# Patient Record
Sex: FEMALE | Race: WHITE | NOT HISPANIC OR LATINO | Employment: OTHER | URBAN - METROPOLITAN AREA
[De-identification: names, ages, dates, MRNs, and addresses within clinical notes are randomized per-mention and may not be internally consistent; named-entity substitution may affect disease eponyms.]

---

## 2018-10-22 LAB
C TRACH DNA SPEC QL PROBE+SIG AMP: NEGATIVE
N GONORRHOEA DNA SPEC QL PROBE+SIG AMP: NEGATIVE
PAP-ABSTRACT: NORMAL
SPECIMEN DESCRIP: NORMAL
SPECIMEN DESCRIPTION: NORMAL

## 2018-11-19 ENCOUNTER — TRANSFERRED RECORDS (OUTPATIENT)
Dept: HEALTH INFORMATION MANAGEMENT | Facility: CLINIC | Age: 33
End: 2018-11-19

## 2018-11-20 ENCOUNTER — TRANSFERRED RECORDS (OUTPATIENT)
Dept: HEALTH INFORMATION MANAGEMENT | Facility: CLINIC | Age: 33
End: 2018-11-20

## 2018-12-06 ENCOUNTER — TRANSFERRED RECORDS (OUTPATIENT)
Dept: HEALTH INFORMATION MANAGEMENT | Facility: CLINIC | Age: 33
End: 2018-12-06

## 2018-12-17 ENCOUNTER — HOSPITAL ENCOUNTER (EMERGENCY)
Facility: CLINIC | Age: 33
Discharge: HOME | End: 2018-12-17
Attending: FAMILY MEDICINE | Admitting: FAMILY MEDICINE
Payer: COMMERCIAL

## 2018-12-17 ENCOUNTER — TELEPHONE (OUTPATIENT)
Dept: OBGYN | Facility: OTHER | Age: 33
End: 2018-12-17

## 2018-12-17 ENCOUNTER — APPOINTMENT (OUTPATIENT)
Dept: ULTRASOUND IMAGING | Facility: CLINIC | Age: 33
End: 2018-12-17
Attending: FAMILY MEDICINE
Payer: COMMERCIAL

## 2018-12-17 VITALS
TEMPERATURE: 98.2 F | RESPIRATION RATE: 18 BRPM | HEART RATE: 70 BPM | OXYGEN SATURATION: 100 % | DIASTOLIC BLOOD PRESSURE: 68 MMHG | SYSTOLIC BLOOD PRESSURE: 109 MMHG | WEIGHT: 143 LBS

## 2018-12-17 DIAGNOSIS — Z34.92 SECOND TRIMESTER PREGNANCY: ICD-10-CM

## 2018-12-17 DIAGNOSIS — K80.20 CALCULUS OF GALLBLADDER WITHOUT CHOLECYSTITIS WITHOUT OBSTRUCTION: ICD-10-CM

## 2018-12-17 LAB
ALBUMIN SERPL-MCNC: 2.8 G/DL (ref 3.4–5)
ALP SERPL-CCNC: 46 U/L (ref 40–150)
ALT SERPL W P-5'-P-CCNC: 10 U/L (ref 0–50)
ANION GAP SERPL CALCULATED.3IONS-SCNC: 7 MMOL/L (ref 3–14)
AST SERPL W P-5'-P-CCNC: 14 U/L (ref 0–45)
BASOPHILS # BLD AUTO: 0.1 10E9/L (ref 0–0.2)
BASOPHILS NFR BLD AUTO: 0.5 %
BILIRUB SERPL-MCNC: 0.3 MG/DL (ref 0.2–1.3)
BUN SERPL-MCNC: 11 MG/DL (ref 7–30)
CALCIUM SERPL-MCNC: 8.2 MG/DL (ref 8.5–10.1)
CHLORIDE SERPL-SCNC: 106 MMOL/L (ref 94–109)
CO2 SERPL-SCNC: 25 MMOL/L (ref 20–32)
CREAT SERPL-MCNC: 0.53 MG/DL (ref 0.52–1.04)
DIFFERENTIAL METHOD BLD: ABNORMAL
EOSINOPHIL NFR BLD AUTO: 1.1 %
ERYTHROCYTE [DISTWIDTH] IN BLOOD BY AUTOMATED COUNT: 13.3 % (ref 10–15)
GFR SERPL CREATININE-BSD FRML MDRD: >90 ML/MIN/1.7M2
GLUCOSE SERPL-MCNC: 93 MG/DL (ref 70–99)
HCT VFR BLD AUTO: 32.3 % (ref 35–47)
HGB BLD-MCNC: 11 G/DL (ref 11.7–15.7)
IMM GRANULOCYTES # BLD: 0.1 10E9/L (ref 0–0.4)
IMM GRANULOCYTES NFR BLD: 0.7 %
LIPASE SERPL-CCNC: 89 U/L (ref 73–393)
LYMPHOCYTES # BLD AUTO: 1.7 10E9/L (ref 0.8–5.3)
LYMPHOCYTES NFR BLD AUTO: 16.4 %
MCH RBC QN AUTO: 32.9 PG (ref 26.5–33)
MCHC RBC AUTO-ENTMCNC: 34.1 G/DL (ref 31.5–36.5)
MCV RBC AUTO: 97 FL (ref 78–100)
MONOCYTES # BLD AUTO: 0.7 10E9/L (ref 0–1.3)
MONOCYTES NFR BLD AUTO: 6.4 %
NEUTROPHILS # BLD AUTO: 7.8 10E9/L (ref 1.6–8.3)
NEUTROPHILS NFR BLD AUTO: 74.9 %
NRBC # BLD AUTO: 0 10*3/UL
NRBC BLD AUTO-RTO: 0 /100
PLATELET # BLD AUTO: 160 10E9/L (ref 150–450)
POTASSIUM SERPL-SCNC: 3.6 MMOL/L (ref 3.4–5.3)
PROT SERPL-MCNC: 6.8 G/DL (ref 6.8–8.8)
RBC # BLD AUTO: 3.34 10E12/L (ref 3.8–5.2)
SODIUM SERPL-SCNC: 138 MMOL/L (ref 133–144)
WBC # BLD AUTO: 10.5 10E9/L (ref 4–11)

## 2018-12-17 PROCEDURE — 85025 COMPLETE CBC W/AUTO DIFF WBC: CPT | Performed by: FAMILY MEDICINE

## 2018-12-17 PROCEDURE — 25000128 H RX IP 250 OP 636: Performed by: FAMILY MEDICINE

## 2018-12-17 PROCEDURE — 96360 HYDRATION IV INFUSION INIT: CPT | Performed by: FAMILY MEDICINE

## 2018-12-17 PROCEDURE — 99284 EMERGENCY DEPT VISIT MOD MDM: CPT | Mod: Z6 | Performed by: FAMILY MEDICINE

## 2018-12-17 PROCEDURE — 83690 ASSAY OF LIPASE: CPT | Performed by: FAMILY MEDICINE

## 2018-12-17 PROCEDURE — 80053 COMPREHEN METABOLIC PANEL: CPT | Performed by: FAMILY MEDICINE

## 2018-12-17 PROCEDURE — 99284 EMERGENCY DEPT VISIT MOD MDM: CPT | Mod: 25 | Performed by: FAMILY MEDICINE

## 2018-12-17 PROCEDURE — 36415 COLL VENOUS BLD VENIPUNCTURE: CPT | Performed by: FAMILY MEDICINE

## 2018-12-17 PROCEDURE — 76705 ECHO EXAM OF ABDOMEN: CPT

## 2018-12-17 PROCEDURE — 25000132 ZZH RX MED GY IP 250 OP 250 PS 637: Performed by: FAMILY MEDICINE

## 2018-12-17 RX ORDER — ACETAMINOPHEN 325 MG/1
975 TABLET ORAL ONCE
Status: COMPLETED | OUTPATIENT
Start: 2018-12-17 | End: 2018-12-17

## 2018-12-17 RX ORDER — SODIUM CHLORIDE 9 MG/ML
1000 INJECTION, SOLUTION INTRAVENOUS CONTINUOUS
Status: DISCONTINUED | OUTPATIENT
Start: 2018-12-17 | End: 2018-12-17 | Stop reason: HOSPADM

## 2018-12-17 RX ORDER — PRENATAL VIT/IRON FUM/FOLIC AC 27MG-0.8MG
1 TABLET ORAL DAILY
COMMUNITY

## 2018-12-17 RX ADMIN — ACETAMINOPHEN 975 MG: 325 TABLET ORAL at 11:46

## 2018-12-17 RX ADMIN — SODIUM CHLORIDE 1000 ML: 9 INJECTION, SOLUTION INTRAVENOUS at 12:51

## 2018-12-17 SDOH — HEALTH STABILITY: MENTAL HEALTH: HOW OFTEN DO YOU HAVE A DRINK CONTAINING ALCOHOL?: NEVER

## 2018-12-17 NOTE — ED AVS SNAPSHOT
[facility] Emergency Department  [address] [place] [address]  Phone:  [phone]  Fax:  [phone]                                    Ethan Watt   MRN: [id number]    Department:  [facility] Emergency Department   Date of Visit:  12/17/2018           After Visit Summary Signature Page    I have received my discharge instructions, and my questions have been answered. I have discussed any challenges I see with this plan with the nurse or doctor.    ..........................................................................................................................................  Patient/Patient Representative Signature      ..........................................................................................................................................  Patient Representative Print Name and Relationship to Patient    ..................................................               ................................................  Date                                   Time    ..........................................................................................................................................  Reviewed by Signature/Title    ...................................................              ..............................................  Date                                               Time          22EPIC Rev 08/18

## 2018-12-17 NOTE — TELEPHONE ENCOUNTER
"Received phone call from pt.  Pt c/o right epigastric pain radiating to right shoulder.  Reports pain 6/10. Denies blurred vision, floaters and h/a. Reports baby moving normally \"feeling flutters\"   Reports chest tightness yesterday.  Advised pt to present to the nearest ED. Pt agrees with plan of care. Nik James RN on 12/17/2018 at 9:33 AM      "

## 2018-12-17 NOTE — ED PROVIDER NOTES
History     Chief Complaint   Patient presents with     Abdominal Pain     HPI  Ethan Watt is a 33 year old female who presents with right upper quadrant abdominal pain this been going on and off for the past 3 days.  It seems to come on only at night.  Patient states taking a deep breath, bending over, bending or twisting or touching the area seems to make the pain worse, nothing seems to improve it other than just time.  Patient denies any nausea or any vomiting.  Patient is currently 20 weeks pregnant but denies any vaginal bleeding or spotting.  Patient denies any dysuria or hematuria.  Patient denies a cough or any shortness of breath.    Problem List:    There are no active problems to display for this patient.       Past Medical History:    History reviewed. No pertinent past medical history.    Past Surgical History:    History reviewed. No pertinent surgical history.    Family History:    History reviewed. No pertinent family history.    Social History:  Marital Status:   [2]  Social History     Tobacco Use     Smoking status: Former Smoker     Last attempt to quit: 2014     Years since quittin.0     Smokeless tobacco: Never Used   Substance Use Topics     Alcohol use: No     Frequency: Never     Drug use: No        Medications:      Prenatal Vit-Fe Fumarate-FA (PRENATAL MULTIVITAMIN W/IRON) 27-0.8 MG tablet         Review of Systems   All other systems reviewed and are negative.      Physical Exam   BP: 114/65  Pulse: 67  Temp: 98.2  F (36.8  C)  Resp: 18  Weight: 64.9 kg (143 lb)  SpO2: 100 %      Physical Exam   Constitutional: She appears well-developed and well-nourished. No distress.   HENT:   Head: Normocephalic and atraumatic.   Mouth/Throat: Oropharynx is clear and moist. No oropharyngeal exudate.   Eyes: EOM are normal.   Neck: Normal range of motion.   Cardiovascular: Normal rate, regular rhythm and normal heart sounds.   No murmur heard.  Pulmonary/Chest: Effort normal  and breath sounds normal. No stridor. No respiratory distress. She has no wheezes.   Abdominal: Soft. Bowel sounds are normal. She exhibits no distension. There is tenderness (ruq abd pain). There is no guarding.   Skin: She is not diaphoretic.   Nursing note and vitals reviewed.      ED Course        Procedures    Results for orders placed or performed during the hospital encounter of 12/17/18   US Abdomen Limited (RUQ)    Narrative    ULTRASOUND ABDOMEN LIMITED December 17, 2018 11:50 AM     HISTORY: Right upper quadrant abdomen pain, 20 weeks pregnant, worried  about biliary colic.     COMPARISON: None.    FINDINGS: The liver demonstrates a mildly coarse, diffusely  hyperechoic echotexture consistent with diffuse fatty infiltration.  There is an ovoid nodule in the posterior segment of the right lobe of  the liver measuring 3.5 x 3.3 x 3.2 cm it demonstrates increased  echogenicity but no abnormal blood flow on Doppler interrogation.  Differential diagnosis for this nodule includes an area of focal fatty  replacement, a hepatic hemangioma or hepatic adenoma. Unless the  patient has a history of malignancy, a malignant lesion is considered  unlikely. No other focal liver lesions identified.    The gallbladder is fluid filled. There are multiple hyperechoic  shadowing gallstones within the gallbladder. There is no gallbladder  wall thickening. There is no pericholecystic fluid. There is no  sonographic Bello's sign. The common duct is normal in size at 4 mm  in diameter.    The pancreas is unremarkable.    The right kidney measures 10.9 cm in length. Right renal echotexture  is normal. There is no hydronephrosis on the right. There is no renal  cyst on the right.    The abdominal aorta and inferior vena cava are visualized.      Impression    IMPRESSION:      1. Ovoid hyperechoic 3.5 cm nodule in the posterior segment of the  right lobe of the liver. Differential diagnosis includes hepatic  hemangioma, focal fatty  replacement and hepatic adenoma. Unless the  patient has an underlying history of malignancy, malignancy is  considered less likely.  2. Gallstones without evidence for acute cholecystitis.  3. Otherwise, normal right upper quadrant ultrasound.   CBC with platelets differential   Result Value Ref Range    WBC 10.5 4.0 - 11.0 10e9/L    RBC Count 3.34 (L) 3.8 - 5.2 10e12/L    Hemoglobin 11.0 (L) 11.7 - 15.7 g/dL    Hematocrit 32.3 (L) 35.0 - 47.0 %    MCV 97 78 - 100 fl    MCH 32.9 26.5 - 33.0 pg    MCHC 34.1 31.5 - 36.5 g/dL    RDW 13.3 10.0 - 15.0 %    Platelet Count 160 150 - 450 10e9/L    Diff Method Automated Method     % Neutrophils 74.9 %    % Lymphocytes 16.4 %    % Monocytes 6.4 %    % Eosinophils 1.1 %    % Basophils 0.5 %    % Immature Granulocytes 0.7 %    Nucleated RBCs 0 0 /100    Absolute Neutrophil 7.8 1.6 - 8.3 10e9/L    Absolute Lymphocytes 1.7 0.8 - 5.3 10e9/L    Absolute Monocytes 0.7 0.0 - 1.3 10e9/L    Absolute Basophils 0.1 0.0 - 0.2 10e9/L    Abs Immature Granulocytes 0.1 0 - 0.4 10e9/L    Absolute Nucleated RBC 0.0    Comprehensive metabolic panel   Result Value Ref Range    Sodium 138 133 - 144 mmol/L    Potassium 3.6 3.4 - 5.3 mmol/L    Chloride 106 94 - 109 mmol/L    Carbon Dioxide 25 20 - 32 mmol/L    Anion Gap 7 3 - 14 mmol/L    Glucose 93 70 - 99 mg/dL    Urea Nitrogen 11 7 - 30 mg/dL    Creatinine 0.53 0.52 - 1.04 mg/dL    GFR Estimate >90 >60 mL/min/1.7m2    GFR Estimate If Black >90 >60 mL/min/1.7m2    Calcium 8.2 (L) 8.5 - 10.1 mg/dL    Bilirubin Total 0.3 0.2 - 1.3 mg/dL    Albumin 2.8 (L) 3.4 - 5.0 g/dL    Protein Total 6.8 6.8 - 8.8 g/dL    Alkaline Phosphatase 46 40 - 150 U/L    ALT 10 0 - 50 U/L    AST 14 0 - 45 U/L   Lipase   Result Value Ref Range    Lipase 89 73 - 393 U/L     Medications   0.9% sodium chloride BOLUS (1,000 mLs Intravenous New Bag 12/17/18 1251)     Followed by   sodium chloride 0.9% infusion (not administered)   acetaminophen (TYLENOL) tablet 975 mg (975 mg  Oral Given 12/17/18 1146)     Labs are reviewed and were unremarkable.  Ultrasound did show gallstones but no signs of wall thickening or other signs of complication.  Patient's pain is well controlled with Tylenol here.  I did speak with the patient's OB doctor, Dr. James, she recommended to have the patient follow-up with surgery and if we can manage this without surgery that would be preferable but this is the time in her pregnancy that surgery would be the least amount of risk.  She also recommended just for her to keep her appointment with her at the end of the month.  She could call if there is any other concerns or symptoms.  In the meantime I will have the patient stick with a low-fat low-fat diet.  All questions were answered and patient is safe to be discharged home at this point.    Assessments & Plan (with Medical Decision Making)  Cholelithiasis, second trimester pregnancy     I have reviewed the nursing notes.    I have reviewed the findings, diagnosis, plan and need for follow up with the patient.              12/17/2018   [facility] EMERGENCY DEPARTMENT     Herberth Gooden MD  12/17/18 [id number]

## 2018-12-17 NOTE — LETTER
12/17/18      To Whom it may concern:    Ethan Watt was in our Emergency Department today, 12/17/18.  Her  needs to be back for an emergency surgery visit for her this Friday.  Please help facilitate this for him.      Sincerely,      Herberth Gooden MD on 12/17/2018 at 2:37 PM

## 2018-12-21 ENCOUNTER — OFFICE VISIT (OUTPATIENT)
Dept: SURGERY | Facility: CLINIC | Age: 33
End: 2018-12-21
Payer: COMMERCIAL

## 2018-12-21 VITALS
SYSTOLIC BLOOD PRESSURE: 120 MMHG | HEART RATE: 94 BPM | DIASTOLIC BLOOD PRESSURE: 60 MMHG | WEIGHT: 145.5 LBS | OXYGEN SATURATION: 99 %

## 2018-12-21 DIAGNOSIS — K80.50 BILIARY COLIC: Primary | ICD-10-CM

## 2018-12-21 DIAGNOSIS — Z3A.20 20 WEEKS GESTATION OF PREGNANCY: ICD-10-CM

## 2018-12-21 PROCEDURE — 99244 OFF/OP CNSLTJ NEW/EST MOD 40: CPT | Performed by: SPECIALIST

## 2018-12-21 NOTE — NURSING NOTE
Ethan Watt is a 33 year old female who presents for:  Chief Complaint   Patient presents with     Abdominal Pain     Consult     referring Giacomo        Initial Vitals:  /60 (BP Location: Right arm, Patient Position: Sitting, Cuff Size: Adult Large)   Pulse 94   Wt 66 kg (145 lb 8 oz)   LMP 07/26/2018 (Approximate)   SpO2 99%  There is no height or weight on file to calculate BMI.. There is no height or weight on file to calculate BSA. BP completed using cuff size: large  Data Unavailable    Do you feel safe in your environment?  Yes  Do you need any refills today? No    Nursing Comments:         Aruna Alfaro
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
## 2018-12-24 ENCOUNTER — TELEPHONE (OUTPATIENT)
Dept: OBGYN | Facility: CLINIC | Age: 33
End: 2018-12-24

## 2018-12-24 ENCOUNTER — TELEPHONE (OUTPATIENT)
Dept: SURGERY | Facility: OTHER | Age: 33
End: 2018-12-24

## 2018-12-24 DIAGNOSIS — K80.50 BILIARY COLIC: Primary | ICD-10-CM

## 2018-12-24 NOTE — TELEPHONE ENCOUNTER
Reason for Call:  Other appointment    Detailed comments: Patient has decided she would like to proceed with surgery. Please place orders and call patient to schedule.     Phone Number Patient can be reached at: Home number on file [phone] (home)    Best Time: any    Can we leave a detailed message on this number? YES    Call taken on 12/24/2018 at 8:27 AM by Agata Obregon

## 2018-12-24 NOTE — TELEPHONE ENCOUNTER
Called placed to patient informing her Dr. Rinaldi's surgery scheduler will contact her later this week, due to the holiday.  Patient verbally states she understands and will await call back...    Message routed to Dr. Rinaldi for surgery order, than he will route to surgery schedulers....................Aruna Alfaro CMA  ([facility])

## 2018-12-24 NOTE — TELEPHONE ENCOUNTER
Pt calling to verify that her records came from [place] for her appt next week.  Please let pt know.  thanks

## 2018-12-26 NOTE — TELEPHONE ENCOUNTER
Do not see any records have been received as of yet. Phone call to patient- notified of this.  Gave [facility] fax number to have records resent.  Patient understood.

## 2018-12-26 NOTE — TELEPHONE ENCOUNTER
Pt calling today and has several questions in regards to doing surgery while she is pregnant, please call pt to discuss. Thank You Ilda

## 2018-12-27 NOTE — TELEPHONE ENCOUNTER
Type of surgery: laparoscopic cholecysectomy  Location of surgery: [facility]   Date of surgery: 1/16/19  Surgeon: Dr. Rinaldi  Pre-Op Appt Date: 1/11/19  Post-Op Appt Date: 1/25/19   Packet sent out: Surgery packet mailed to patient's home address.   Pre-cert/Authorization completed: NA  Date: 12/27/2018    Angi Ayoub  Surgery Scheduler

## 2018-12-31 ENCOUNTER — PRENATAL OFFICE VISIT (OUTPATIENT)
Dept: OBGYN | Facility: OTHER | Age: 33
End: 2018-12-31
Payer: COMMERCIAL

## 2018-12-31 VITALS
WEIGHT: 148.25 LBS | HEIGHT: 67 IN | HEART RATE: 68 BPM | SYSTOLIC BLOOD PRESSURE: 100 MMHG | DIASTOLIC BLOOD PRESSURE: 60 MMHG | BODY MASS INDEX: 23.27 KG/M2

## 2018-12-31 DIAGNOSIS — Z98.890 H/O LEEP: ICD-10-CM

## 2018-12-31 DIAGNOSIS — O26.899 RH NEGATIVE STATE IN ANTEPARTUM PERIOD: ICD-10-CM

## 2018-12-31 DIAGNOSIS — Z67.91 RH NEGATIVE STATE IN ANTEPARTUM PERIOD: ICD-10-CM

## 2018-12-31 DIAGNOSIS — Z23 NEED FOR TDAP VACCINATION: ICD-10-CM

## 2018-12-31 DIAGNOSIS — Z34.92 NORMAL PREGNANCY IN SECOND TRIMESTER: ICD-10-CM

## 2018-12-31 PROBLEM — K80.20 CALCULUS OF GALLBLADDER: Status: ACTIVE | Noted: 2018-12-31

## 2018-12-31 PROCEDURE — 99207 ZZC PRENATAL VISIT: CPT | Performed by: OBSTETRICS & GYNECOLOGY

## 2018-12-31 ASSESSMENT — PATIENT HEALTH QUESTIONNAIRE - PHQ9
SUM OF ALL RESPONSES TO PHQ QUESTIONS 1-9: 2
SUM OF ALL RESPONSES TO PHQ QUESTIONS 1-9: 2
10. IF YOU CHECKED OFF ANY PROBLEMS, HOW DIFFICULT HAVE THESE PROBLEMS MADE IT FOR YOU TO DO YOUR WORK, TAKE CARE OF THINGS AT HOME, OR GET ALONG WITH OTHER PEOPLE: NOT DIFFICULT AT ALL

## 2018-12-31 ASSESSMENT — MIFFLIN-ST. JEOR: SCORE: 1405.21

## 2018-12-31 NOTE — NURSING NOTE
"Chief Complaint   Patient presents with     Prenatal Care       Initial /60 (BP Location: Left arm, Patient Position: Chair, Cuff Size: Adult Regular)   Pulse 68   Ht 1.702 m (5' 7.01\")   Wt 67.2 kg (148 lb 4 oz)   LMP 2018 (Approximate)   BMI 23.21 kg/m   Estimated body mass index is 23.21 kg/m  as calculated from the following:    Height as of this encounter: 1.702 m (5' 7.01\").    Weight as of this encounter: 67.2 kg (148 lb 4 oz).  BP completed using cuff size: regular        Agata Galvez, PAIGE  2018          "

## 2018-12-31 NOTE — PROGRESS NOTES
33 year old  at 22w4d weeks presents to the clinic for a routine prenatal visit.  Patient is a transfer from [place].  Records already sent here. Will review those when they are available.    Patient was seen in the ED 2 weeks ago with gallstones.  She has surgery  with Dr. Rinaldi.  She has been feeling better since the ED visit but is still planning on surgery  Patient had cleft palate as a child=will have patient see [facility].  She was told at her anatomy ultrasound that the palate was not well seen.  No leakage of fluid, vaginal bleeding, or contractions   Good fetal movement.  FHTs:  170's  Fundal height:  23cm  Anatomy ultrasound already completed and normal per patient  RTC 4 weeks    Sherly De Dios

## 2019-01-01 ASSESSMENT — PATIENT HEALTH QUESTIONNAIRE - PHQ9: SUM OF ALL RESPONSES TO PHQ QUESTIONS 1-9: 2

## 2019-01-08 PROBLEM — Z34.92 NORMAL PREGNANCY IN SECOND TRIMESTER: Status: ACTIVE | Noted: 2018-12-31

## 2019-01-11 ENCOUNTER — NURSE TRIAGE (OUTPATIENT)
Dept: NURSING | Facility: CLINIC | Age: 34
End: 2019-01-11

## 2019-01-11 ENCOUNTER — TELEPHONE (OUTPATIENT)
Dept: OBGYN | Facility: CLINIC | Age: 34
End: 2019-01-11

## 2019-01-11 ENCOUNTER — OFFICE VISIT (OUTPATIENT)
Dept: FAMILY MEDICINE | Facility: CLINIC | Age: 34
End: 2019-01-11
Payer: COMMERCIAL

## 2019-01-11 ENCOUNTER — TELEPHONE (OUTPATIENT)
Dept: FAMILY MEDICINE | Facility: CLINIC | Age: 34
End: 2019-01-11

## 2019-01-11 VITALS
HEART RATE: 74 BPM | RESPIRATION RATE: 18 BRPM | BODY MASS INDEX: 23.07 KG/M2 | WEIGHT: 147 LBS | OXYGEN SATURATION: 100 % | HEIGHT: 67 IN | DIASTOLIC BLOOD PRESSURE: 62 MMHG | TEMPERATURE: 98.3 F | SYSTOLIC BLOOD PRESSURE: 100 MMHG

## 2019-01-11 DIAGNOSIS — O26.899 RH NEGATIVE STATE IN ANTEPARTUM PERIOD: ICD-10-CM

## 2019-01-11 DIAGNOSIS — K80.20 CALCULUS OF GALLBLADDER WITHOUT CHOLECYSTITIS WITHOUT OBSTRUCTION: ICD-10-CM

## 2019-01-11 DIAGNOSIS — K80.50 BILIARY COLIC: ICD-10-CM

## 2019-01-11 DIAGNOSIS — Z67.91 RH NEGATIVE STATE IN ANTEPARTUM PERIOD: ICD-10-CM

## 2019-01-11 DIAGNOSIS — Z01.818 PREOP GENERAL PHYSICAL EXAM: Primary | ICD-10-CM

## 2019-01-11 DIAGNOSIS — Z34.92 NORMAL PREGNANCY IN SECOND TRIMESTER: ICD-10-CM

## 2019-01-11 PROCEDURE — 99205 OFFICE O/P NEW HI 60 MIN: CPT | Performed by: FAMILY MEDICINE

## 2019-01-11 ASSESSMENT — MIFFLIN-ST. JEOR: SCORE: 1394.42

## 2019-01-11 ASSESSMENT — PAIN SCALES - GENERAL: PAINLEVEL: NO PAIN (0)

## 2019-01-11 NOTE — PROGRESS NOTES
[address] [address]  [phone]  Dept: [phone]    PRE-OP EVALUATION:  Today's date: 2019    Ethan Watt (: 1985) presents for pre-operative evaluation assessment as requested by Dr. Rinaldi .  She requires evaluation and anesthesia risk assessment prior to undergoing surgery/procedure for treatment of gallbladder disease, stones  .    Fax number for surgical facility:   Primary Physician: No Ref-Primary, Physician  Type of Anesthesia Anticipated: General    Patient has a Health Care Directive or Living Will:  NO    Preop Questions 2019   Who is doing your surgery? Davey Rinaldi   What are you having done? gallbladder removal   Date of Surgery/Procedure: 2018   Facility or Hospital where procedure/surgery will be performed: [facility]   1.  Do you have a history of Heart attack, stroke, stent, coronary bypass surgery, or other heart surgery? No   2.  Do you ever have any pain or discomfort in your chest? No   3.  Do you have a history of  Heart Failure? No   4.   Are you troubled by shortness of breath when:  walking on a level surface, or up a slight hill, or at night? No   5.  Do you currently have a cold, bronchitis or other respiratory infection? No   6.  Do you have a cough, shortness of breath, or wheezing? No   7.  Do you sometimes get pains in the calves of your legs when you walk? No   8. Do you or anyone in your family have previous history of blood clots? YES - Grandpa had a clot in cartoid.  No family medical history of VTE.     9.  Do you or does anyone in your family have a serious bleeding problem such as prolonged bleeding following surgeries or cuts? NO    10. Have you ever had problems with anemia or been told to take iron pills? No   11. Have you had any abnormal blood loss such as black, tarry or bloody stools, or abnormal vaginal bleeding? No   12. Have you ever had a blood transfusion?  No   13. Have you or any of your relatives ever had problems with anesthesia? No   14. Do you have sleep apnea, excessive snoring or daytime drowsiness? No   15. Do you have any prosthetic heart valves? No   16. Do you have prosthetic joints? No   17. Is there any chance that you may be pregnant? YES - 24 weeks 1 day as we do preop.         HPI:     HPI related to upcoming procedure: has had 2 different gallbladder attacks and 1 severe enough to bring her to the ER.  Is pregnant, but OB/GYN recommended that if she was going to have her gallbladder taken out, now is the time to do it as she is 24w1d.  She is rh negative.  Labs are being sent from [place] and not in the system at this time.      See problem list for active medical problems.  Problems all longstanding and stable, except as noted/documented.  See ROS for pertinent symptoms related to these conditions.                                                                                                                                                          .    MEDICAL HISTORY:     Patient Active Problem List    Diagnosis Date Noted     Normal pregnancy in second trimester 12/31/2018     Priority: Medium     AFP=negative, RPR=negative, normal first trimester screening, normal pap smear 10/22 with negative HPV, negative GC and Chlamydia, A- antibody negative, RI, Ugzc=354, HIV=negative, HBsAg=negative 10/10       Need for Tdap vaccination 12/31/2018     Priority: Medium     Calculus of gallbladder 12/31/2018     Priority: Medium     H/O LEEP 12/31/2018     Priority: Medium     Rh negative state in antepartum period 12/31/2018     Priority: Medium      No past medical history on file.  No past surgical history on file.  Current Outpatient Medications   Medication Sig Dispense Refill     Prenatal Vit-Fe Fumarate-FA (PRENATAL MULTIVITAMIN W/IRON) 27-0.8 MG tablet Take 1 tablet by mouth daily       UNABLE TO FIND MEDICATION NAME: Comfort Tone       OTC products:  "None, except as noted above    No Known Allergies   Latex Allergy: NO    Social History     Tobacco Use     Smoking status: Former Smoker     Last attempt to quit: 2014     Years since quittin.0     Smokeless tobacco: Never Used   Substance Use Topics     Alcohol use: No     Frequency: Never     History   Drug Use No       REVIEW OF SYSTEMS:   Constitutional, neuro, ENT, endocrine, pulmonary, cardiac, gastrointestinal, genitourinary, musculoskeletal, integument and psychiatric systems are negative, except as otherwise noted.    EXAM:   /62   Pulse 74   Temp 98.3  F (36.8  C) (Temporal)   Resp 18   Ht 1.702 m (5' 7\")   Wt 66.7 kg (147 lb)   LMP 2018 (Approximate)   SpO2 100%   Breastfeeding? No   BMI 23.02 kg/m      GENERAL APPEARANCE: healthy, alert and no distress     EYES: EOMI, PERRL     HENT: ear canals and TM's normal and nose and mouth without ulcers or lesions     NECK: no adenopathy, no asymmetry, masses, or scars and thyroid normal to palpation     RESP: lungs clear to auscultation - no rales, rhonchi or wheezes     CV: regular rates and rhythm, normal S1 S2, no S3 or S4 and no murmur, click or rub     ABDOMEN:  soft, nontender, no HSM and bowel sounds normal.  Gravid uterus consistent with stated dates.     MS: extremities normal- no gross deformities noted, no evidence of inflammation in joints, FROM in all extremities.     SKIN: no suspicious lesions or rashes     NEURO: Normal strength and tone, sensory exam grossly normal, mentation intact and speech normal     PSYCH: mentation appears normal. and affect normal/bright     LYMPHATICS: No cervical adenopathy    DIAGNOSTICS:   No addition labs needed at this time.      Recent Labs   Lab Test 18  1110   HGB 11.0*         POTASSIUM 3.6   CR 0.53        IMPRESSION:   Reason for surgery/procedure:    Biliary colic  Calculus of gallbladder without cholecystitis without obstruction    Diagnosis/reason for " consult:   Normal pregnancy in second trimester  Rh negative state in antepartum period      The proposed surgical procedure is considered INTERMEDIATE risk.    REVISED CARDIAC RISK INDEX  The patient has the following serious cardiovascular risks for perioperative complications such as (MI, PE, VFib and 3  AV Block):  No serious cardiac risks  INTERPRETATION: 0 risks: Class I (very low risk - 0.4% complication rate)    The patient has the following additional risks for perioperative complications:  pregnancy      ICD-10-CM    1. Preop general physical exam Z01.818    2. Biliary colic K80.50    3. Calculus of gallbladder without cholecystitis without obstruction K80.20    4. Normal pregnancy in second trimester Z34.92    5. Rh negative state in antepartum period O09.899     Z67.91        RECOMMENDATIONS:       --Patient is to take all scheduled medications on the day of surgery.    APPROVAL GIVEN to proceed with proposed procedure, without further diagnostic evaluation    PATIENT REQUIRES 300 MCG OF RHOGAM AFTER HER SURGICAL PROCEDURE DUE TO HER RH NEGATIVE STATUS.  NEEDS TYPE AND SCREEN DONE BEFORE ADMINISTRATION OF RHOGAM.    Signed Electronically by: Michael Salazar MD    Copy of this evaluation report is provided to requesting physician.    [facility] Preop Guidelines    Revised Cardiac Risk Index

## 2019-01-11 NOTE — TELEPHONE ENCOUNTER
MFM called asking if we have received records from her previous provider.  Informed them that we have not yet and will send them as soon as we receive them.    Agata Galvez, [facility]  January 11, 2019

## 2019-01-11 NOTE — TELEPHONE ENCOUNTER
I have contacted the pt. I informed her she can  a bottle of Hibiclens at the clinic or she can purchase over the counter. Keyonna Hanks CMA ([facility])

## 2019-01-11 NOTE — TELEPHONE ENCOUNTER
Clinic Action Needed:Yes, Please call patient  ok to leave detailed message  Reason for Call:Patient calling stating she was in for a pre op appointment today. Surgery is scheduled for 1/16/19. Stating surgery scheduling advised her she should have gotten the pre scrub soap from the clinic.  Patient stating she did not receive the soap and forgot to ask for it.     Noemi Castano RN  [facility]

## 2019-01-11 NOTE — TELEPHONE ENCOUNTER
Clinic Action Needed:Yes, Please call patient  ok to leave detailed message  Reason for Call:Patient calling stating she was in for a pre op appointment today. Stating surgery scheduling advised her she should have gotten the pre scrub soap from the clinic.  Patient stating she did not receive the soap and forgot to ask for it.     Noemi Castano RN  [facility]

## 2019-01-14 ENCOUNTER — TELEPHONE (OUTPATIENT)
Dept: OBGYN | Facility: CLINIC | Age: 34
End: 2019-01-14

## 2019-01-14 ENCOUNTER — TRANSFERRED RECORDS (OUTPATIENT)
Dept: HEALTH INFORMATION MANAGEMENT | Facility: CLINIC | Age: 34
End: 2019-01-14

## 2019-01-14 NOTE — TELEPHONE ENCOUNTER
As of now we still have not received any records for patient, looked in scanning as well.      Keyla Johnson  [facility]

## 2019-01-14 NOTE — TELEPHONE ENCOUNTER
Received phone call from [facility], requesting labs.  Labs from 12/17/18 faxed to [phone]. Nik James RN on 1/14/2019 at 9:09 AM

## 2019-01-14 NOTE — TELEPHONE ENCOUNTER
Spoke with patient to verify what fax records were coming to. Patient thinks that she had thrown away that paper. Gave Marni Marr's fax for today only to have patient's previous provider send too. Will await for incoming records.     Keyla Johnson  [facility]

## 2019-01-14 NOTE — TELEPHONE ENCOUNTER
Records received. faxed to [facility], copy will be left for Dr. De Dios to review, and copy for scanning.    Keyla Johnson  [facility]

## 2019-01-16 ENCOUNTER — HOSPITAL ENCOUNTER (OUTPATIENT)
Facility: CLINIC | Age: 34
Discharge: HOME | End: 2019-01-16
Attending: SPECIALIST | Admitting: SPECIALIST
Payer: COMMERCIAL

## 2019-01-16 ENCOUNTER — ANESTHESIA EVENT (OUTPATIENT)
Dept: SURGERY | Facility: CLINIC | Age: 34
End: 2019-01-16
Payer: COMMERCIAL

## 2019-01-16 ENCOUNTER — ANESTHESIA (OUTPATIENT)
Dept: SURGERY | Facility: CLINIC | Age: 34
End: 2019-01-16
Payer: COMMERCIAL

## 2019-01-16 VITALS
BODY MASS INDEX: 23.23 KG/M2 | HEIGHT: 67 IN | HEART RATE: 58 BPM | RESPIRATION RATE: 18 BRPM | SYSTOLIC BLOOD PRESSURE: 109 MMHG | DIASTOLIC BLOOD PRESSURE: 66 MMHG | WEIGHT: 148 LBS | OXYGEN SATURATION: 99 % | TEMPERATURE: 98.1 F

## 2019-01-16 DIAGNOSIS — R11.0 POSTOPERATIVE NAUSEA: ICD-10-CM

## 2019-01-16 DIAGNOSIS — Z98.890 POSTOPERATIVE NAUSEA: ICD-10-CM

## 2019-01-16 DIAGNOSIS — O26.899 RH NEGATIVE STATE IN ANTEPARTUM PERIOD: ICD-10-CM

## 2019-01-16 DIAGNOSIS — Z34.92 NORMAL PREGNANCY IN SECOND TRIMESTER: Primary | ICD-10-CM

## 2019-01-16 DIAGNOSIS — Z67.91 RH NEGATIVE STATE IN ANTEPARTUM PERIOD: ICD-10-CM

## 2019-01-16 DIAGNOSIS — G89.18 POST-OP PAIN: ICD-10-CM

## 2019-01-16 LAB
ABO + RH BLD: NORMAL
ABO + RH BLD: NORMAL
BLD GP AB SCN SERPL QL: NORMAL
BLOOD BANK CMNT PATIENT-IMP: NORMAL
BLOOD BANK CMNT PATIENT-IMP: NORMAL
SPECIMEN EXP DATE BLD: NORMAL

## 2019-01-16 PROCEDURE — 71000012 ZZH RECOVERY PHASE 1 LEVEL 1 FIRST HR: Performed by: SPECIALIST

## 2019-01-16 PROCEDURE — 86850 RBC ANTIBODY SCREEN: CPT | Performed by: SPECIALIST

## 2019-01-16 PROCEDURE — 36000058 ZZH SURGERY LEVEL 3 EA 15 ADDTL MIN: Performed by: SPECIALIST

## 2019-01-16 PROCEDURE — 37000008 ZZH ANESTHESIA TECHNICAL FEE, 1ST 30 MIN: Performed by: SPECIALIST

## 2019-01-16 PROCEDURE — 86901 BLOOD TYPING SEROLOGIC RH(D): CPT | Performed by: SPECIALIST

## 2019-01-16 PROCEDURE — 36000056 ZZH SURGERY LEVEL 3 1ST 30 MIN: Performed by: SPECIALIST

## 2019-01-16 PROCEDURE — 27210794 ZZH OR GENERAL SUPPLY STERILE: Performed by: SPECIALIST

## 2019-01-16 PROCEDURE — 25000566 ZZH SEVOFLURANE, EA 15 MIN: Performed by: SPECIALIST

## 2019-01-16 PROCEDURE — 88304 TISSUE EXAM BY PATHOLOGIST: CPT | Mod: 26 | Performed by: SPECIALIST

## 2019-01-16 PROCEDURE — 25000125 ZZHC RX 250: Performed by: NURSE ANESTHETIST, CERTIFIED REGISTERED

## 2019-01-16 PROCEDURE — 85461 HEMOGLOBIN FETAL: CPT | Performed by: SPECIALIST

## 2019-01-16 PROCEDURE — 25000128 H RX IP 250 OP 636: Performed by: NURSE ANESTHETIST, CERTIFIED REGISTERED

## 2019-01-16 PROCEDURE — 40000306 ZZH STATISTIC PRE PROC ASSESS II: Performed by: SPECIALIST

## 2019-01-16 PROCEDURE — 88304 TISSUE EXAM BY PATHOLOGIST: CPT | Performed by: SPECIALIST

## 2019-01-16 PROCEDURE — 71000027 ZZH RECOVERY PHASE 2 EACH 15 MINS: Performed by: SPECIALIST

## 2019-01-16 PROCEDURE — 25000128 H RX IP 250 OP 636: Performed by: SPECIALIST

## 2019-01-16 PROCEDURE — 27110028 ZZH OR GENERAL SUPPLY NON-STERILE: Performed by: SPECIALIST

## 2019-01-16 PROCEDURE — 86900 BLOOD TYPING SEROLOGIC ABO: CPT | Mod: 91 | Performed by: SPECIALIST

## 2019-01-16 PROCEDURE — 25000125 ZZHC RX 250: Performed by: SPECIALIST

## 2019-01-16 PROCEDURE — 37000009 ZZH ANESTHESIA TECHNICAL FEE, EACH ADDTL 15 MIN: Performed by: SPECIALIST

## 2019-01-16 PROCEDURE — 47562 LAPAROSCOPIC CHOLECYSTECTOMY: CPT | Performed by: SPECIALIST

## 2019-01-16 RX ORDER — FENTANYL CITRATE 50 UG/ML
25-50 INJECTION, SOLUTION INTRAMUSCULAR; INTRAVENOUS
Status: DISCONTINUED | OUTPATIENT
Start: 2019-01-16 | End: 2019-01-16 | Stop reason: HOSPADM

## 2019-01-16 RX ORDER — LIDOCAINE HYDROCHLORIDE 20 MG/ML
INJECTION, SOLUTION INFILTRATION; PERINEURAL PRN
Status: DISCONTINUED | OUTPATIENT
Start: 2019-01-16 | End: 2019-01-16

## 2019-01-16 RX ORDER — CEFAZOLIN SODIUM 1 G/3ML
1 INJECTION, POWDER, FOR SOLUTION INTRAMUSCULAR; INTRAVENOUS SEE ADMIN INSTRUCTIONS
Status: DISCONTINUED | OUTPATIENT
Start: 2019-01-16 | End: 2019-01-16 | Stop reason: HOSPADM

## 2019-01-16 RX ORDER — ONDANSETRON 4 MG/1
4 TABLET, ORALLY DISINTEGRATING ORAL EVERY 30 MIN PRN
Status: DISCONTINUED | OUTPATIENT
Start: 2019-01-16 | End: 2019-01-16 | Stop reason: HOSPADM

## 2019-01-16 RX ORDER — OXYCODONE AND ACETAMINOPHEN 5; 325 MG/1; MG/1
1 TABLET ORAL
Status: DISCONTINUED | OUTPATIENT
Start: 2019-01-16 | End: 2019-01-16 | Stop reason: HOSPADM

## 2019-01-16 RX ORDER — ONDANSETRON 2 MG/ML
4 INJECTION INTRAMUSCULAR; INTRAVENOUS EVERY 30 MIN PRN
Status: DISCONTINUED | OUTPATIENT
Start: 2019-01-16 | End: 2019-01-16 | Stop reason: HOSPADM

## 2019-01-16 RX ORDER — NALOXONE HYDROCHLORIDE 0.4 MG/ML
.1-.4 INJECTION, SOLUTION INTRAMUSCULAR; INTRAVENOUS; SUBCUTANEOUS
Status: DISCONTINUED | OUTPATIENT
Start: 2019-01-16 | End: 2019-01-16 | Stop reason: HOSPADM

## 2019-01-16 RX ORDER — OXYCODONE AND ACETAMINOPHEN 5; 325 MG/1; MG/1
1-2 TABLET ORAL EVERY 4 HOURS PRN
Qty: 16 TABLET | Refills: 0 | Status: SHIPPED | OUTPATIENT
Start: 2019-01-16 | End: 2019-04-11

## 2019-01-16 RX ORDER — DEXAMETHASONE SODIUM PHOSPHATE 10 MG/ML
INJECTION, SOLUTION INTRAMUSCULAR; INTRAVENOUS PRN
Status: DISCONTINUED | OUTPATIENT
Start: 2019-01-16 | End: 2019-01-16

## 2019-01-16 RX ORDER — SODIUM CHLORIDE, SODIUM LACTATE, POTASSIUM CHLORIDE, CALCIUM CHLORIDE 600; 310; 30; 20 MG/100ML; MG/100ML; MG/100ML; MG/100ML
INJECTION, SOLUTION INTRAVENOUS CONTINUOUS
Status: DISCONTINUED | OUTPATIENT
Start: 2019-01-16 | End: 2019-01-16 | Stop reason: HOSPADM

## 2019-01-16 RX ORDER — DIMENHYDRINATE 50 MG/ML
25 INJECTION, SOLUTION INTRAMUSCULAR; INTRAVENOUS
Status: DISCONTINUED | OUTPATIENT
Start: 2019-01-16 | End: 2019-01-16 | Stop reason: HOSPADM

## 2019-01-16 RX ORDER — LIDOCAINE 40 MG/G
CREAM TOPICAL
Status: DISCONTINUED | OUTPATIENT
Start: 2019-01-16 | End: 2019-01-16 | Stop reason: HOSPADM

## 2019-01-16 RX ORDER — ONDANSETRON 2 MG/ML
INJECTION INTRAMUSCULAR; INTRAVENOUS PRN
Status: DISCONTINUED | OUTPATIENT
Start: 2019-01-16 | End: 2019-01-16

## 2019-01-16 RX ORDER — PROPOFOL 10 MG/ML
INJECTION, EMULSION INTRAVENOUS PRN
Status: DISCONTINUED | OUTPATIENT
Start: 2019-01-16 | End: 2019-01-16

## 2019-01-16 RX ORDER — HYDROMORPHONE HYDROCHLORIDE 1 MG/ML
.3-.5 INJECTION, SOLUTION INTRAMUSCULAR; INTRAVENOUS; SUBCUTANEOUS EVERY 10 MIN PRN
Status: DISCONTINUED | OUTPATIENT
Start: 2019-01-16 | End: 2019-01-16 | Stop reason: HOSPADM

## 2019-01-16 RX ORDER — GLYCOPYRROLATE 0.2 MG/ML
INJECTION, SOLUTION INTRAMUSCULAR; INTRAVENOUS PRN
Status: DISCONTINUED | OUTPATIENT
Start: 2019-01-16 | End: 2019-01-16

## 2019-01-16 RX ORDER — FENTANYL CITRATE 50 UG/ML
INJECTION, SOLUTION INTRAMUSCULAR; INTRAVENOUS PRN
Status: DISCONTINUED | OUTPATIENT
Start: 2019-01-16 | End: 2019-01-16

## 2019-01-16 RX ORDER — MEPERIDINE HYDROCHLORIDE 25 MG/ML
12.5 INJECTION INTRAMUSCULAR; INTRAVENOUS; SUBCUTANEOUS
Status: DISCONTINUED | OUTPATIENT
Start: 2019-01-16 | End: 2019-01-16 | Stop reason: HOSPADM

## 2019-01-16 RX ORDER — BUPIVACAINE HYDROCHLORIDE AND EPINEPHRINE 5; 5 MG/ML; UG/ML
30 INJECTION, SOLUTION PERINEURAL ONCE
Status: COMPLETED | OUTPATIENT
Start: 2019-01-16 | End: 2019-01-16

## 2019-01-16 RX ORDER — NEOSTIGMINE METHYLSULFATE 1 MG/ML
VIAL (ML) INJECTION PRN
Status: DISCONTINUED | OUTPATIENT
Start: 2019-01-16 | End: 2019-01-16

## 2019-01-16 RX ORDER — ONDANSETRON 4 MG/1
4-8 TABLET, ORALLY DISINTEGRATING ORAL EVERY 8 HOURS PRN
Qty: 4 TABLET | Refills: 3 | Status: SHIPPED | OUTPATIENT
Start: 2019-01-16 | End: 2019-04-11

## 2019-01-16 RX ORDER — CEFAZOLIN SODIUM 2 G/100ML
2 INJECTION, SOLUTION INTRAVENOUS
Status: COMPLETED | OUTPATIENT
Start: 2019-01-16 | End: 2019-01-16

## 2019-01-16 RX ADMIN — SODIUM CHLORIDE, POTASSIUM CHLORIDE, SODIUM LACTATE AND CALCIUM CHLORIDE: 600; 310; 30; 20 INJECTION, SOLUTION INTRAVENOUS at 11:35

## 2019-01-16 RX ADMIN — FENTANYL CITRATE 25 MCG: 50 INJECTION, SOLUTION INTRAMUSCULAR; INTRAVENOUS at 12:19

## 2019-01-16 RX ADMIN — GLYCOPYRROLATE 0.4 MG: 0.2 INJECTION, SOLUTION INTRAMUSCULAR; INTRAVENOUS at 12:02

## 2019-01-16 RX ADMIN — Medication 2 MG: at 12:01

## 2019-01-16 RX ADMIN — DEXAMETHASONE SODIUM PHOSPHATE 10 MG: 10 INJECTION, SOLUTION INTRAMUSCULAR; INTRAVENOUS at 11:28

## 2019-01-16 RX ADMIN — Medication 80 MG: at 11:12

## 2019-01-16 RX ADMIN — ONDANSETRON 4 MG: 2 INJECTION INTRAMUSCULAR; INTRAVENOUS at 11:58

## 2019-01-16 RX ADMIN — FENTANYL CITRATE 25 MCG: 50 INJECTION, SOLUTION INTRAMUSCULAR; INTRAVENOUS at 12:06

## 2019-01-16 RX ADMIN — PROPOFOL 200 MG: 10 INJECTION, EMULSION INTRAVENOUS at 11:12

## 2019-01-16 RX ADMIN — CEFAZOLIN SODIUM 2 G: 2 INJECTION, SOLUTION INTRAVENOUS at 11:17

## 2019-01-16 RX ADMIN — FENTANYL CITRATE 25 MCG: 50 INJECTION, SOLUTION INTRAMUSCULAR; INTRAVENOUS at 11:12

## 2019-01-16 RX ADMIN — FENTANYL CITRATE 25 MCG: 50 INJECTION, SOLUTION INTRAMUSCULAR; INTRAVENOUS at 11:44

## 2019-01-16 RX ADMIN — LIDOCAINE HYDROCHLORIDE 80 MG: 20 INJECTION, SOLUTION INFILTRATION; PERINEURAL at 11:12

## 2019-01-16 RX ADMIN — ROCURONIUM BROMIDE 20 MG: 10 INJECTION INTRAVENOUS at 11:18

## 2019-01-16 RX ADMIN — PHENYLEPHRINE HYDROCHLORIDE 100 MCG: 10 INJECTION, SOLUTION INTRAMUSCULAR; INTRAVENOUS; SUBCUTANEOUS at 11:37

## 2019-01-16 RX ADMIN — SODIUM CHLORIDE, POTASSIUM CHLORIDE, SODIUM LACTATE AND CALCIUM CHLORIDE: 600; 310; 30; 20 INJECTION, SOLUTION INTRAVENOUS at 10:08

## 2019-01-16 RX ADMIN — HUMAN RHO(D) IMMUNE GLOBULIN 300 MCG: 300 INJECTION, SOLUTION INTRAMUSCULAR at 13:53

## 2019-01-16 RX ADMIN — LIDOCAINE HYDROCHLORIDE 1 ML: 10 INJECTION, SOLUTION EPIDURAL; INFILTRATION; INTRACAUDAL; PERINEURAL at 10:08

## 2019-01-16 ASSESSMENT — MIFFLIN-ST. JEOR: SCORE: 1398.95

## 2019-01-16 ASSESSMENT — LIFESTYLE VARIABLES: TOBACCO_USE: 1

## 2019-01-16 NOTE — PROGRESS NOTES
Fetal heart tones frequently monitored on phase II. Rate at 1430 of 120bpm, rate at 1515 of 130 bpm.

## 2019-01-16 NOTE — DISCHARGE INSTRUCTIONS
Follow-up Care:    [facility]    Home Care Following Gallbladder Surgery    Kyara Celis MD        Care of the Incision:    Remove gauze dressing (if present) after 48 hours.    Leave small strips in place (if present).  They will gradually come off.    If surgical glue was used on your incision, keep it dry for 24 hours.  Then you may shower but don t submerge under water for at least 2 weeks.  Gently pat your incision dry with a freshly laundered towel.    Do not touch your incision with bare hands or pick at scabs.    Leave your incision open to air.  Cover it only if draining, clothing rubs or irritates it.    Activity:    Gradually increase your activity.  Walk short distances several times each day and increase the distance as your strength allows.    To promote circulation, do not cross your legs while sitting.    No strenuous lifting or straining for 2-3 weeks.  Do not lift anything over 10-20 pounds until your doctor approves an increase.    Return to work will be determined by the type of work you do and should be discussed with your physician.    Do not drive or operate equipment while taking prescription pain medicines.  You may drive 1 week after surgery if you have stopped taking prescription pain medicines and can react quickly enough to make an emergency stop if necessary.    Diet:    Continue a low fat diet for 1 week then resume usual diet as tolerated.    Drink plenty of water.    Avoid foods that cause constipation.      REMEMBER--most prescription pain pills cause constipation.  Walking, extra fluids, and increased fiber (fresh fruits and vegetables, etc.) are natural remedies for constipation.  You can also take mineral oil, 1-2 Tablespoons per day.  If still constipated you may try a stool softener such as Colace or Miralax.    Call Your Physician if You Have:    Redness, increased swelling or cloudy drainage from your incision.    A temperature of more than 101 degrees  F.    Worsening pain in your incision not relieved by your prescription pain pills and/or a short rest.    Jaundice (yellowing of skin or eyes)    Abdominal distention (stomach getting very large)    Swelling in your legs    Productive cough    Burning with urination    Any questions or concerns about your recovery, please call     Business hours [phone]    After hours [phone] Nurse Advice Line (24 hours a day)    Follow-up Care:    Make an appointment 2-3 week after your surgery.  Call [phone].  [facility]   Adult Discharge Orders & Instructions     For 24 hours after surgery    1. Get plenty of rest.  A responsible adult must stay with you for at least 24 hours after you leave the hospital.   2. Do not drive or use heavy equipment.  If you have weakness or tingling, don't drive or use heavy equipment until this feeling goes away.  3. Do not drink alcohol.  4. Avoid strenuous or risky activities.  Ask for help when climbing stairs.   5. You may feel lightheaded.  If so, sit for a few minutes before standing.  Have someone help you get up.   6. You may have a slight fever. Call the doctor if your fever is over 100 F (37.7 C) (taken under the tongue) or lasts longer than 24 hours.  7. You may have a dry mouth, a sore throat, muscle aches or trouble sleeping.  These should go away after 24 hours.  8. Do not make important or legal decisions.  We don t expect you to have any problems from the surgery or treatment you had today. Just in case, here s what to do if you have pain, upset stomach (nausea), bleeding or infection:  Pain:  Take medicines your doctor has prescribed or over-the-counter medicine they have suggested. Resting and using ice packs can help, too. For surgery on an arm or leg, raise it on a pillow to ease swelling. Call your doctor if these methods don t work.  Copyright [employer], Licensed under [employer]  Upset stomach (nausea):  Take  anti-nausea medicine approved by your doctor. Drink clear liquids like apple juice, ginger ale, broth or 7-Up. Be sure to drink enough fluids. Rest can help, too. Move to normal foods when you re ready. Bleeding:  In the first 24 hours, you may see a little blood on your dressing, about the size of a quarter. You don t need to worry about this much blood, but if the blood spot keeps getting bigger:    Put pressure on the wound if you can, AND    Call your doctor.  Copyright [employer], Licensed under [employer]  Fever/Infection: Please call your doctor if you have any of these signs:    Redness    Swelling    Wound feels warm    Pain gets worse    Bad-smelling fluid leaks from wound    Fever or chills  Call your doctor for any of the followin.  It has been over 8 to 10 hours since surgery and you are still not able to urinate (pass water).    Nurse advice line: [phone] (24 hour)

## 2019-01-16 NOTE — BRIEF OP NOTE
[facility] Brief Operative Note    Pre-operative diagnosis: Biliary colic   Post-operative diagnosis Same   Procedure: Procedure(s):  LAPAROSCOPIC CHOLECYSTECTOMY   Surgeon(s): Surgeon(s) and Role:     * Davey Rinaldi MD - Primary   Estimated blood loss: * No values recorded between 1/16/2019 11:27 AM and 1/16/2019 12:03 PM *    Specimens: ID Type Source Tests Collected by Time Destination   A : Gallbladder and Contents Tissue Gallbladder and Contents SURGICAL PATHOLOGY EXAM Davey Rinaldi MD 1/16/2019 11:39 AM       Findings: Gallbladder with stones       Davey Rinaldi MD, FACS    #[id number]

## 2019-01-16 NOTE — OR NURSING
1307: Verbal report given to Kirstin MANNING to assume phase 2 recovery. Pt transferred to room 10 via cart.  and her mother back in room with her and supportive.

## 2019-01-16 NOTE — ANESTHESIA POSTPROCEDURE EVALUATION
Patient: Ethan Watt    Procedure(s):  LAPAROSCOPIC CHOLECYSTECTOMY    Diagnosis:Cholelithiasis  Diagnosis Additional Information: No value filed.    Anesthesia Type:  General, ETT, RSI    Note:  Anesthesia Post Evaluation    Patient location during evaluation: Phase 2  Patient participation: Able to fully participate in evaluation  Level of consciousness: awake and alert  Pain management: adequate  Airway patency: patent  Cardiovascular status: acceptable  Respiratory status: acceptable  Hydration status: acceptable  PONV: none     Anesthetic complications: None    Comments: Fetal heart tones in the 120's, patient is feeling well.          Last vitals:  Vitals:    01/16/19 1300 01/16/19 1305 01/16/19 1310   BP: 110/58  116/69   Pulse: 66  66   Resp: 18     Temp: 98.1  F (36.7  C)     SpO2: 99% 97%          Electronically Signed By: VALERIA Doyle CRNA  January 16, 2019  2:00 PM

## 2019-01-16 NOTE — ANESTHESIA PREPROCEDURE EVALUATION
Anesthesia Pre-Procedure Evaluation    Patient: Ethan Watt   MRN: [id number] : 1985          Preoperative Diagnosis: Cholelithiasis    Procedure(s):  LAPAROSCOPIC CHOLECYSTECTOMY    No past medical history on file.  No past surgical history on file.    Anesthesia Evaluation     . Pt has had prior anesthetic. Type: MAC           ROS/MED HX    ENT/Pulmonary:     (+)tobacco use, Past use , . .    Neurologic:  - neg neurologic ROS     Cardiovascular:  - neg cardiovascular ROS       METS/Exercise Tolerance:  >4 METS   Hematologic:  - neg hematologic  ROS       Musculoskeletal:  - neg musculoskeletal ROS       GI/Hepatic:     (+) cholecystitis/cholelithiasis,       Renal/Genitourinary:  - ROS Renal section negative       Endo:  - neg endo ROS       Psychiatric:  - neg psychiatric ROS       Infectious Disease:  - neg infectious disease ROS       Malignancy:      - no malignancy   Other:    (+) Possibly pregnant                         Physical Exam  Normal systems: cardiovascular, pulmonary and dental    Airway   Mallampati: I  TM distance: >3 FB  Neck ROM: full    Dental     Cardiovascular   Rhythm and rate: regular and normal      Pulmonary    breath sounds clear to auscultation            Lab Results   Component Value Date    WBC 10.5 2018    HGB 11.0 (L) 2018    HCT 32.3 (L) 2018     2018     2018    POTASSIUM 3.6 2018    CHLORIDE 106 2018    CO2 25 2018    BUN 11 2018    CR 0.53 2018    GLC 93 2018    BOB 8.2 (L) 2018    ALBUMIN 2.8 (L) 2018    PROTTOTAL 6.8 2018    ALT 10 2018    AST 14 2018    ALKPHOS 46 2018    BILITOTAL 0.3 2018    LIPASE 89 2018       Preop Vitals  BP Readings from Last 3 Encounters:   19 100/62   18 100/60   18 120/60    Pulse Readings from Last 3 Encounters:   19 74   18 68   18 94      Resp Readings from Last 3  "Encounters:   01/11/19 18   12/17/18 18    SpO2 Readings from Last 3 Encounters:   01/11/19 100%   12/21/18 99%   12/17/18 100%      Temp Readings from Last 1 Encounters:   01/11/19 98.3  F (36.8  C) (Temporal)    Ht Readings from Last 1 Encounters:   01/11/19 1.702 m (5' 7\")      Wt Readings from Last 1 Encounters:   01/11/19 66.7 kg (147 lb)    Estimated body mass index is 23.02 kg/m  as calculated from the following:    Height as of 1/11/19: 1.702 m (5' 7\").    Weight as of 1/11/19: 66.7 kg (147 lb).       Anesthesia Plan      History & Physical Review  History and physical reviewed and following examination; no interval change.    ASA Status:  2 .    NPO Status:  > 8 hours    Plan for General, ETT and RSI with Intravenous and Propofol induction. Maintenance will be Inhalation.    PONV prophylaxis:  Dexamethasone or Solumedrol and Ondansetron (or other 5HT-3)  Additional equipment: Videolaryngoscope OB called and will check fetal heart tones pre-procedure and post procedure.      Postoperative Care  Postoperative pain management:  IV analgesics and Oral pain medications.      Consents  Anesthetic plan, risks, benefits and alternatives discussed with:  Patient..                 VALERIA Doyle CRNA  "

## 2019-01-16 NOTE — OR NURSING
1216:  Verbal report received from Alyson MARTINEZ RN and Sydni CONTRERAS. Phase 2 recovery assumed by Kaylie MANNING. Pt post-op general anesthesia for lap ever per .

## 2019-01-17 LAB
ABO + RH BLD: NORMAL
ABO + RH BLD: NORMAL
BLD GP AB SCN SERPL QL: NORMAL
BLOOD BANK CMNT PATIENT-IMP: NORMAL
DATE RH IMM GL GVN: NORMAL
FETAL CELL SCN BLD QL ROSETTE: NORMAL
RH IG VIALS RECOM PATIENT: NORMAL

## 2019-01-17 NOTE — OR NURSING
"[facility] Same Day [facility]  Discharge Call Back  Ethan Watt  1985  MRN: [id number]  Home: [phone] (home)   PCP: Michael Salazar    We are calling to see how you're doing since your surgery/procedure with us?   Comments: \"doing okay\"  Clinical Questions  1. Have you had time to look at your discharge instructions? Do you have any questions in regards to the instructions?   Comment: no questions   2. Do you feel your pain is being controlled with the regimen the surgeon sent you home on? (ie: prescription medications, over the counter pain medications, ice packs)   Comments: a little sore   3. Have you noticed any drainage on your dressing? Do you know what to do if you have bleeding as a result of your procedure?   Comments: Band-Aid still in place, none on Band-Aid   4. Have you had any nausea/vomiting? Do you know how to treat this?   Comment: none   5. Have you had any signs/symptoms of infection? (ie: fever, swelling, heat, drainage or redness) Do you know what to do if you have?   Comment: no, no fever   6. Do you have a follow up appointment made with your surgeon? Do you have a number to contact them at if you need it?   Comment: yes   Retained Foreign Object (FIDELIA, Hemovac, Penrose, Wound Packing, Vaginal Packing, Nasal Splints, Urethral Stents, Reyna Catheter)  1. Do you still have NA in place?   2. If the item is still in place, can you review the plan for removal with me? NA  Recognition  Is there anyone from your care team that you would like to recognize?   Comments: \"all of you guys\"   Improvement  Our goal is to be the best. Do you have any suggestions for things that we could improve on?   Comments: nothing   You may be randomly selected to fill out a [facility] survey. We would appreciate you taking the time to fill this out. It is important to us if you would answer all of the questions on the survey.            "

## 2019-01-17 NOTE — OP NOTE
Procedure Date: 01/16/2019      PREOPERATIVE DIAGNOSIS:  Biliary colic.      POSTOPERATIVE DIAGNOSIS:  Biliary colic.      PROCEDURE PERFORMED:  Laparoscopic cholecystectomy.      SURGEON:  Davey Rinaldi MD, [facility]      ANESTHESIA:  General     INDICATIONS:  This is a 24-week gravid female who was initially seen for symptoms of biliary colic and was found to have gallstones.  She attempted nonoperative management, but failed, and due to the multiple attacks and persistent nausea, she opted to have a laparoscopic cholecystectomy.      OPERATIVE FINDINGS:  Included gallbladder with stones.      DETAILS OF PROCEDURE:  The patient was taken to the operating room and placed on the table in supine position.  Prior to this, fetal heart tones were detected in the preoperative holding area.  The abdomen was then prepped and draped in sterile fashion.  A timeout was performed confirming the identity of the patient as well as the procedure to be performed.  A small supraumbilical incision was made.  Subcutaneous tissue was opened using cautery.  The fascia was grasped, opened using  an 11 blade.  We then bluntly dissected into the abdomen and inserted a Selena trocar.  Generalized inspection of the abdomen was then carried out.  The uterus was readily visualized, and there was no evidence of any injury to the uterus from insertion of the trocar.  Two right upper quadrant 5 mm trocars and an 11 mm subxiphoid trocar were placed.  The gallbladder was grasped, pulled into position.  The peritoneum of the medial and lateral aspects of the gallbladder were then taken down using a ConMed dissector and cautery.  The cystic duct and artery were then dissected free, creating a large window with the [place] dissector and then creating a large window into the base of the gallbladder.  Initially, the cystic artery was clipped followed by the cystic duct.  The gallbladder was then removed from the liver bed using cautery and the abdomen  using an EndoCatch bag and was found to contain several small stones.  The area was copiously irrigated.  All fluid was suctioned out.  Hemostasis in the liver bed was achieved using cautery.  The subxiphoid trocar was then closed using a PMI needle, cone and #1 PDS.  The remaining two 5 mm trocars were removed without evidence of any bleeding.  The supraumbilical fascia was then closed using an interrupted #1 PDS.  All skin incisions were closed using 3-0 Vicryl and 4-0 Monocryl subcuticular stitches.  Steri-Strips, sterile dressings were applied.  The patient was then taken from the operating to recovery in stable condition to be sent home with an order for RhoGAM and fetal heart tones.         JEFFRY HURTADO MD , FACS            D: 2019   T: 2019   MT: JARED      Name:     DRAKE KAUFMAN   MRN:      [id number]        Account:        [id number]   :      1985           Procedure Date: 2019      Document: [id number]

## 2019-01-18 ENCOUNTER — TRANSFERRED RECORDS (OUTPATIENT)
Dept: HEALTH INFORMATION MANAGEMENT | Facility: CLINIC | Age: 34
End: 2019-01-18

## 2019-01-21 LAB — COPATH REPORT: NORMAL

## 2019-01-24 ENCOUNTER — TELEPHONE (OUTPATIENT)
Dept: OBGYN | Facility: CLINIC | Age: 34
End: 2019-01-24

## 2019-01-24 NOTE — TELEPHONE ENCOUNTER
Reason for Call:  Other call back    Detailed comments: Patient is calling because she would like to know if her  medical records from [place] was received.     Phone Number Patient can be reached at: Home number on file [phone] (home)    Best Time: anytime     Can we leave a detailed message on this number? YES    Call taken on 1/24/2019 at 12:02 PM by Kalee Hermosillo

## 2019-01-25 ENCOUNTER — OFFICE VISIT (OUTPATIENT)
Dept: SURGERY | Facility: CLINIC | Age: 34
End: 2019-01-25
Payer: COMMERCIAL

## 2019-01-25 VITALS
DIASTOLIC BLOOD PRESSURE: 60 MMHG | TEMPERATURE: 97.5 F | HEART RATE: 93 BPM | SYSTOLIC BLOOD PRESSURE: 122 MMHG | BODY MASS INDEX: 23.01 KG/M2 | OXYGEN SATURATION: 100 % | WEIGHT: 146.9 LBS

## 2019-01-25 DIAGNOSIS — Z98.890 POST-OPERATIVE STATE: Primary | ICD-10-CM

## 2019-01-25 PROCEDURE — 99024 POSTOP FOLLOW-UP VISIT: CPT | Performed by: SPECIALIST

## 2019-01-25 NOTE — NURSING NOTE
"Ethan Watt is a 34 year old female who presents for:  Chief Complaint   Patient presents with     Surgical Followup     LAPAROSCOPIC CHOLECYSTECTOMY  DOS 01-        Initial Vitals:  /60   Pulse 93   Temp 97.5  F (36.4  C) (Temporal)   Wt 66.6 kg (146 lb 14.4 oz)   LMP 07/26/2018 (Approximate)   SpO2 100%   BMI 23.01 kg/m   Estimated body mass index is 23.01 kg/m  as calculated from the following:    Height as of 1/16/19: 1.702 m (5' 7\").    Weight as of this encounter: 66.6 kg (146 lb 14.4 oz).. Body surface area is 1.77 meters squared. BP completed using cuff size: large  Data Unavailable        Nursing Comments:         Aruna Alfaro CMA  "

## 2019-01-25 NOTE — LETTER
1/25/2019         RE: Ethan Watt  [address] [facility] [address]        Dear Colleague,    Thank you for referring your patient, Ethan Watt, to the [facility]. Please see a copy of my visit note below.    F/U for lap ever.    Subjective:  Pt feels good.  Pre-op sx have resolved.    Objective:  B/P: 122/60, T: 97.5, P: 93, R: Data Unavailable  Abd: Soft, non tender, non distended,       Path -   SPECIMEN(S):   Gallbladder and contents     FINAL DIAGNOSIS:   Gallbladder, cholecystectomy:   - Calculous chronic cholecystitis.     Electronically signed out by:     Mika Bhat M.D., PhD     Assessment/Plan:  Pt s/p lap ever. Doing well.  Pre-op sx have resolved.  Patient will increase their activity as fatty food intake as tolerated.  F/U with me PRN.    Davey Rinaldi MD, FACS      Again, thank you for allowing me to participate in the care of your patient.        Sincerely,        Davey Rinaldi MD

## 2019-01-25 NOTE — PROGRESS NOTES
F/U for lap ever.    Subjective:  Pt feels good.  Pre-op sx have resolved.    Objective:  B/P: 122/60, T: 97.5, P: 93, R: Data Unavailable  Abd: Soft, non tender, non distended,       Path -   SPECIMEN(S):   Gallbladder and contents     FINAL DIAGNOSIS:   Gallbladder, cholecystectomy:   - Calculous chronic cholecystitis.     Electronically signed out by:     Mika Bhat M.D., PhD     Assessment/Plan:  Pt s/p lap ever. Doing well.  Pre-op sx have resolved.  Patient will increase their activity as fatty food intake as tolerated.  F/U with me PRN.    Davey Rinaldi MD, FACS

## 2019-01-28 ENCOUNTER — PRENATAL OFFICE VISIT (OUTPATIENT)
Dept: OBGYN | Facility: OTHER | Age: 34
End: 2019-01-28
Payer: COMMERCIAL

## 2019-01-28 VITALS
BODY MASS INDEX: 23.02 KG/M2 | WEIGHT: 147 LBS | SYSTOLIC BLOOD PRESSURE: 120 MMHG | HEART RATE: 78 BPM | DIASTOLIC BLOOD PRESSURE: 82 MMHG

## 2019-01-28 DIAGNOSIS — Z34.92 NORMAL PREGNANCY IN SECOND TRIMESTER: Primary | ICD-10-CM

## 2019-01-28 DIAGNOSIS — Z67.91 RH NEGATIVE STATE IN ANTEPARTUM PERIOD: ICD-10-CM

## 2019-01-28 DIAGNOSIS — O26.899 RH NEGATIVE STATE IN ANTEPARTUM PERIOD: ICD-10-CM

## 2019-01-28 LAB
GLUCOSE 1H P 50 G GLC PO SERPL-MCNC: 95 MG/DL (ref 60–129)
HGB BLD-MCNC: 11.1 G/DL (ref 11.7–15.7)

## 2019-01-28 PROCEDURE — 82950 GLUCOSE TEST: CPT | Performed by: OBSTETRICS & GYNECOLOGY

## 2019-01-28 PROCEDURE — 36415 COLL VENOUS BLD VENIPUNCTURE: CPT | Performed by: OBSTETRICS & GYNECOLOGY

## 2019-01-28 PROCEDURE — 99207 ZZC PRENATAL VISIT: CPT | Performed by: OBSTETRICS & GYNECOLOGY

## 2019-01-28 PROCEDURE — 00000218 ZZHCL STATISTIC OBHBG - HEMOGLOBIN: Performed by: OBSTETRICS & GYNECOLOGY

## 2019-01-28 RX ORDER — PNV NO.95/FERROUS FUM/FOLIC AC 28MG-0.8MG
1 TABLET ORAL DAILY
COMMUNITY
End: 2019-06-07

## 2019-01-28 NOTE — PATIENT INSTRUCTIONS
What to watch out for are: regular contractions every 5 min, vaginal bleeding, decreased fetal movement, or leakage of fluid.  Please call the office or go to L&D if you develop any of these signs and symptoms.      I will see you for your follow up appointment.  Please feel free to call if you have any questions or concerns.      Thanks,  Sherly De Dios, DO

## 2019-01-28 NOTE — PROGRESS NOTES
34 year old  at 26w4d weeks presents to the clinic for a routine prenatal visit.  No concerns.  No vaginal bleeding, leakage of fluid, or contractions   Yayo Rosales at [place] on .  She did receive Rhogam then-approximately 24 weeks  Good fetal movement.  DTNr=809's  Fundal height=26cm    Normal anatomy ultrasound with MFM  RTC 2 weeks  GCT today  Blood type: A-    Sherly De Dios

## 2019-02-15 ENCOUNTER — PRENATAL OFFICE VISIT (OUTPATIENT)
Dept: OBGYN | Facility: OTHER | Age: 34
End: 2019-02-15
Payer: COMMERCIAL

## 2019-02-15 VITALS
SYSTOLIC BLOOD PRESSURE: 96 MMHG | DIASTOLIC BLOOD PRESSURE: 62 MMHG | BODY MASS INDEX: 24.43 KG/M2 | WEIGHT: 156 LBS | HEART RATE: 68 BPM

## 2019-02-15 DIAGNOSIS — O12.13 GESTATIONAL PROTEINURIA IN THIRD TRIMESTER: ICD-10-CM

## 2019-02-15 DIAGNOSIS — R04.0 BLOODY NOSE: ICD-10-CM

## 2019-02-15 DIAGNOSIS — Z34.93 NORMAL PREGNANCY IN THIRD TRIMESTER: Primary | ICD-10-CM

## 2019-02-15 DIAGNOSIS — Z23 NEED FOR TDAP VACCINATION: ICD-10-CM

## 2019-02-15 LAB
ALBUMIN UR-MCNC: NEGATIVE MG/DL
ERYTHROCYTE [DISTWIDTH] IN BLOOD BY AUTOMATED COUNT: 13.2 % (ref 10–15)
HCT VFR BLD AUTO: 36.1 % (ref 35–47)
HGB BLD-MCNC: 11.7 G/DL (ref 11.7–15.7)
MCH RBC QN AUTO: 32.2 PG (ref 26.5–33)
MCHC RBC AUTO-ENTMCNC: 32.4 G/DL (ref 31.5–36.5)
MCV RBC AUTO: 99 FL (ref 78–100)
PLATELET # BLD AUTO: 133 10E9/L (ref 150–450)
RBC # BLD AUTO: 3.63 10E12/L (ref 3.8–5.2)
WBC # BLD AUTO: 15.9 10E9/L (ref 4–11)

## 2019-02-15 PROCEDURE — 85027 COMPLETE CBC AUTOMATED: CPT | Performed by: OBSTETRICS & GYNECOLOGY

## 2019-02-15 PROCEDURE — 99207 ZZC PRENATAL VISIT: CPT | Performed by: OBSTETRICS & GYNECOLOGY

## 2019-02-15 PROCEDURE — 36415 COLL VENOUS BLD VENIPUNCTURE: CPT | Performed by: OBSTETRICS & GYNECOLOGY

## 2019-02-15 PROCEDURE — 81003 URINALYSIS AUTO W/O SCOPE: CPT | Performed by: OBSTETRICS & GYNECOLOGY

## 2019-02-15 PROCEDURE — 90471 IMMUNIZATION ADMIN: CPT | Performed by: OBSTETRICS & GYNECOLOGY

## 2019-02-15 PROCEDURE — 90715 TDAP VACCINE 7 YRS/> IM: CPT | Performed by: OBSTETRICS & GYNECOLOGY

## 2019-02-15 NOTE — NURSING NOTE
Screening Questionnaire for Adult Immunization    Are you sick today?   No   Do you have allergies to medications, food, a vaccine component or latex?   No   Have you ever had a serious reaction after receiving a vaccination?   No   Do you have a long-term health problem with heart disease, lung disease, asthma, kidney disease, metabolic disease (e.g. diabetes), anemia, or other blood disorder?   No   Do you have cancer, leukemia, HIV/AIDS, or any other immune system problem?   No   In the past 3 months, have you taken medications that affect  your immune system, such as prednisone, other steroids, or anticancer drugs; drugs for the treatment of rheumatoid arthritis, Crohn s disease, or psoriasis; or have you had radiation treatments?   No   Have you had a seizure, or a brain or other nervous system problem?   No   During the past year, have you received a transfusion of blood or blood     products, or been given immune (gamma) globulin or antiviral drug?   No   For women: Are you pregnant or is there a chance you could become        pregnant during the next month?   Yes   Have you received any vaccinations in the past 4 weeks?   No     Immunization questionnaire was positive for at least one answer.  Notified Dr. De Dios.        Per orders of Dr. De Dios, injection of TDAP given by Avani Schafer. Patient instructed to remain in clinic for 15 minutes afterwards, and to report any adverse reaction to me immediately.       Screening performed by Avani Schafer on 2/15/2019 at 4:30 PM.

## 2019-02-15 NOTE — PROGRESS NOTES
34 year old  at 29w1d weeks presents to the clinic for a routine prenatal visit.  She complains of a bloody nose the last few mornings.  Her plts were 160 in Dec before her surgery.  Will check again today.  She has added a humidifier to her room.  No vaginal bleeding, leakage of fluid, or contractions   Patient had blood work and urine done for work and she was told she a lot of protein in her urine.  No headache or vision changes.  No N/V.  No RUQ or epigastric pain.   Will check a urine today.  Good fetal movement.  Fundal height=29cm  UVIv=714's  GCT=95  Hgb=11.1  Rh=A-  Rhogam at 24 weeks after surgery  RTC 2 weeks.  TDAP today    Sherly De Dios

## 2019-02-15 NOTE — PROGRESS NOTES
TDAP Vaccine (Adacel)     Date Status Dose VIS Date Route Site  Lot# Given By Verified By   2/15/2019 Given 0.5 mL 02/24/2015, Given Today IM RD [employer] [id number] Avani Schafer MA --   Exp. Date NDC # Product Time Location External Comment   11/20/2020 26014-487-46 ADACEL --  --    Updated by: Avani Schafer MA on 2/15/2019  4:48 PM

## 2019-02-16 DIAGNOSIS — D69.6 THROMBOCYTOPENIA (H): Primary | ICD-10-CM

## 2019-02-18 DIAGNOSIS — D69.6 THROMBOCYTOPENIA (H): ICD-10-CM

## 2019-02-18 LAB
ALBUMIN SERPL-MCNC: 2.9 G/DL (ref 3.4–5)
ALP SERPL-CCNC: 102 U/L (ref 40–150)
ALT SERPL W P-5'-P-CCNC: 23 U/L (ref 0–50)
ANION GAP SERPL CALCULATED.3IONS-SCNC: 9 MMOL/L (ref 3–14)
AST SERPL W P-5'-P-CCNC: 23 U/L (ref 0–45)
BILIRUB SERPL-MCNC: 0.3 MG/DL (ref 0.2–1.3)
BUN SERPL-MCNC: 9 MG/DL (ref 7–30)
CALCIUM SERPL-MCNC: 8.8 MG/DL (ref 8.5–10.1)
CHLORIDE SERPL-SCNC: 104 MMOL/L (ref 94–109)
CO2 SERPL-SCNC: 25 MMOL/L (ref 20–32)
CREAT SERPL-MCNC: 0.39 MG/DL (ref 0.52–1.04)
GFR SERPL CREATININE-BSD FRML MDRD: >90 ML/MIN/{1.73_M2}
GLUCOSE SERPL-MCNC: 98 MG/DL (ref 70–99)
POTASSIUM SERPL-SCNC: 3.9 MMOL/L (ref 3.4–5.3)
PROT SERPL-MCNC: 7 G/DL (ref 6.8–8.8)
SODIUM SERPL-SCNC: 138 MMOL/L (ref 133–144)

## 2019-02-18 PROCEDURE — 80053 COMPREHEN METABOLIC PANEL: CPT | Performed by: OBSTETRICS & GYNECOLOGY

## 2019-02-18 PROCEDURE — 36415 COLL VENOUS BLD VENIPUNCTURE: CPT | Performed by: OBSTETRICS & GYNECOLOGY

## 2019-02-19 DIAGNOSIS — D69.6 BENIGN GESTATIONAL THROMBOCYTOPENIA IN THIRD TRIMESTER (H): Primary | ICD-10-CM

## 2019-02-19 DIAGNOSIS — O99.113 BENIGN GESTATIONAL THROMBOCYTOPENIA IN THIRD TRIMESTER (H): Primary | ICD-10-CM

## 2019-02-20 ENCOUNTER — TELEPHONE (OUTPATIENT)
Dept: ONCOLOGY | Facility: CLINIC | Age: 34
End: 2019-02-20

## 2019-02-20 NOTE — TELEPHONE ENCOUNTER
ONCOLOGY INTAKE: Records Information      APPT INFORMATION: 03/11 w/Javad at  0945  Referring provider:  Sherly De Dios  Referring provider s clinic:   Ob/gyn:  Reason for visit/diagnosis:  Benign gestational thrombocytopenia in third trimester (H) [O99.113, D69.6]    Were the records received with the referral (via [employer])? Complete    Has patient been seen for any external appt for this diagnosis (enter clinic/location)? No    Benign gestational thrombocytopenia in third trimester (H) [O99.113, D69.6]:caller intake: ref by:Sherly De Dios, DO- Ob/gyn: Records in Epic

## 2019-03-04 ENCOUNTER — PRENATAL OFFICE VISIT (OUTPATIENT)
Dept: OBGYN | Facility: OTHER | Age: 34
End: 2019-03-04
Payer: COMMERCIAL

## 2019-03-04 VITALS
HEART RATE: 78 BPM | SYSTOLIC BLOOD PRESSURE: 110 MMHG | BODY MASS INDEX: 24.9 KG/M2 | WEIGHT: 159 LBS | DIASTOLIC BLOOD PRESSURE: 54 MMHG

## 2019-03-04 DIAGNOSIS — D69.6 THROMBOCYTOPENIA (H): ICD-10-CM

## 2019-03-04 DIAGNOSIS — O26.899 RH NEGATIVE STATE IN ANTEPARTUM PERIOD: ICD-10-CM

## 2019-03-04 DIAGNOSIS — Z34.93 NORMAL PREGNANCY IN THIRD TRIMESTER: Primary | ICD-10-CM

## 2019-03-04 DIAGNOSIS — Z67.91 RH NEGATIVE STATE IN ANTEPARTUM PERIOD: ICD-10-CM

## 2019-03-04 PROCEDURE — 99207 ZZC PRENATAL VISIT: CPT | Performed by: OBSTETRICS & GYNECOLOGY

## 2019-03-04 NOTE — PROGRESS NOTES
34 year old  at 31w4d weeks presents to the clinic for a routine prenatal visit.  No concerns. Patient denies any vaginal bleeding, leakage of fluid, or contractions   Good fetal movement.    Fundal height=32cm  DYJj=927's  GDS=95  Hgb=11.7  Uhht=021.  Patient has a Hematology appointment on 3/11.  Occasional nose bleeds in the am when she wakes up.  She does run a humidifier  RTC 2 weeks    Sherly De Dios

## 2019-03-11 ENCOUNTER — ONCOLOGY VISIT (OUTPATIENT)
Dept: ONCOLOGY | Facility: CLINIC | Age: 34
End: 2019-03-11
Attending: OBSTETRICS & GYNECOLOGY
Payer: COMMERCIAL

## 2019-03-11 VITALS
HEART RATE: 67 BPM | HEIGHT: 67 IN | WEIGHT: 164 LBS | RESPIRATION RATE: 20 BRPM | DIASTOLIC BLOOD PRESSURE: 69 MMHG | BODY MASS INDEX: 25.74 KG/M2 | TEMPERATURE: 97.8 F | SYSTOLIC BLOOD PRESSURE: 107 MMHG | OXYGEN SATURATION: 100 %

## 2019-03-11 DIAGNOSIS — D69.6 THROMBOCYTOPENIA (H): Primary | ICD-10-CM

## 2019-03-11 LAB
ALBUMIN SERPL-MCNC: 3 G/DL (ref 3.4–5)
ALP SERPL-CCNC: 122 U/L (ref 40–150)
ALT SERPL W P-5'-P-CCNC: 26 U/L (ref 0–50)
ANION GAP SERPL CALCULATED.3IONS-SCNC: 7 MMOL/L (ref 3–14)
APTT PPP: 25 SEC (ref 22–37)
AST SERPL W P-5'-P-CCNC: 23 U/L (ref 0–45)
BASOPHILS # BLD AUTO: 0.1 10E9/L (ref 0–0.2)
BASOPHILS NFR BLD AUTO: 0.4 %
BILIRUB SERPL-MCNC: 0.1 MG/DL (ref 0.2–1.3)
BUN SERPL-MCNC: 9 MG/DL (ref 7–30)
CALCIUM SERPL-MCNC: 8.7 MG/DL (ref 8.5–10.1)
CHLORIDE SERPL-SCNC: 107 MMOL/L (ref 94–109)
CO2 SERPL-SCNC: 26 MMOL/L (ref 20–32)
CREAT SERPL-MCNC: 0.49 MG/DL (ref 0.52–1.04)
DIFFERENTIAL METHOD BLD: ABNORMAL
EOSINOPHIL # BLD AUTO: 0.1 10E9/L (ref 0–0.7)
EOSINOPHIL NFR BLD AUTO: 1 %
ERYTHROCYTE [DISTWIDTH] IN BLOOD BY AUTOMATED COUNT: 13.1 % (ref 10–15)
GFR SERPL CREATININE-BSD FRML MDRD: >90 ML/MIN/{1.73_M2}
GLUCOSE SERPL-MCNC: 72 MG/DL (ref 70–99)
HCT VFR BLD AUTO: 37.8 % (ref 35–47)
HCV AB SERPL QL IA: NONREACTIVE
HGB BLD-MCNC: 12.3 G/DL (ref 11.7–15.7)
HIV 1+2 AB+HIV1 P24 AG SERPL QL IA: NONREACTIVE
IMM GRANULOCYTES # BLD: 0.4 10E9/L (ref 0–0.4)
IMM GRANULOCYTES NFR BLD: 2.6 %
INR PPP: 0.89 (ref 0.86–1.14)
LYMPHOCYTES # BLD AUTO: 2.1 10E9/L (ref 0.8–5.3)
LYMPHOCYTES NFR BLD AUTO: 15.3 %
MCH RBC QN AUTO: 31.9 PG (ref 26.5–33)
MCHC RBC AUTO-ENTMCNC: 32.5 G/DL (ref 31.5–36.5)
MCV RBC AUTO: 98 FL (ref 78–100)
MONOCYTES # BLD AUTO: 1.2 10E9/L (ref 0–1.3)
MONOCYTES NFR BLD AUTO: 8.7 %
NEUTROPHILS # BLD AUTO: 9.9 10E9/L (ref 1.6–8.3)
NEUTROPHILS NFR BLD AUTO: 72 %
PLATELET # BLD AUTO: 129 10E9/L (ref 150–450)
POTASSIUM SERPL-SCNC: 3.8 MMOL/L (ref 3.4–5.3)
PROT SERPL-MCNC: 7.2 G/DL (ref 6.8–8.8)
RBC # BLD AUTO: 3.86 10E12/L (ref 3.8–5.2)
RETICS # AUTO: 102.3 10E9/L (ref 25–95)
RETICS/RBC NFR AUTO: 2.7 % (ref 0.5–2)
SODIUM SERPL-SCNC: 140 MMOL/L (ref 133–144)
WBC # BLD AUTO: 13.7 10E9/L (ref 4–11)

## 2019-03-11 PROCEDURE — 85060 BLOOD SMEAR INTERPRETATION: CPT | Performed by: INTERNAL MEDICINE

## 2019-03-11 PROCEDURE — 85025 COMPLETE CBC W/AUTO DIFF WBC: CPT | Performed by: INTERNAL MEDICINE

## 2019-03-11 PROCEDURE — 86803 HEPATITIS C AB TEST: CPT | Performed by: INTERNAL MEDICINE

## 2019-03-11 PROCEDURE — 36415 COLL VENOUS BLD VENIPUNCTURE: CPT | Performed by: INTERNAL MEDICINE

## 2019-03-11 PROCEDURE — 85730 THROMBOPLASTIN TIME PARTIAL: CPT | Performed by: INTERNAL MEDICINE

## 2019-03-11 PROCEDURE — 87389 HIV-1 AG W/HIV-1&-2 AB AG IA: CPT | Performed by: INTERNAL MEDICINE

## 2019-03-11 PROCEDURE — 99204 OFFICE O/P NEW MOD 45 MIN: CPT | Performed by: INTERNAL MEDICINE

## 2019-03-11 PROCEDURE — 80053 COMPREHEN METABOLIC PANEL: CPT | Performed by: INTERNAL MEDICINE

## 2019-03-11 PROCEDURE — 85610 PROTHROMBIN TIME: CPT | Performed by: INTERNAL MEDICINE

## 2019-03-11 PROCEDURE — 85045 AUTOMATED RETICULOCYTE COUNT: CPT | Performed by: INTERNAL MEDICINE

## 2019-03-11 ASSESSMENT — MIFFLIN-ST. JEOR: SCORE: 1471.53

## 2019-03-11 ASSESSMENT — PAIN SCALES - GENERAL: PAINLEVEL: NO PAIN (0)

## 2019-03-11 NOTE — LETTER
3/11/2019         RE: Ethan Watt  [address] [facility] [address]        Dear Colleague,    Thank you for referring your patient, Ethan Watt, to the [facility]. Please see a copy of my visit note below.    DATE OF VISIT: Mar 11, 2019    REASON FOR REFERRAL:   Thrombocytopenia in pregnancy      HISTORY OF PRESENT ILLNESS:     34-year-old female with past medical history significant for gallstones and presents to hematology clinic today for further evaluation of incidentally noted mild thrombocytopenia.    Patient is  and currently in her third trimester of pregnancy who had a routine follow-up with Obstetrics last month and was noted to be thrombocytopenic with platelets at 133K. Prior laboratory workup in 2018 showed normal platelet count.     Patient denies any history of prior thrombocytopenia or bleeding/bruising symptoms. Has noticed infrequent nose bleeds for the past one to 2 months. She denies bleeding otherwise, petechia, excess bruising, bleeding with prior surgeries, Family history of blood disorders, liver disease, Skin rash, joint pain, Miscarriages, history of gastric ulcer, infections, fevers/chills, antibiotic use Or any other complaints.    REVIEW OF SYSTEMS:      ROS: 14 point ROS neg other than the symptoms noted above in the HPI.    PAST MEDICAL HISTORY:   Gall stones    PAST SURGICAL HISTORY:   Past Surgical History:   Procedure Laterality Date     LAPAROSCOPIC CHOLECYSTECTOMY N/A 2019    Procedure: LAPAROSCOPIC CHOLECYSTECTOMY;  Surgeon: Davey Rinaldi MD;  Location:  OR       ALLERGIES:   Allergies as of 2019     (No Known Allergies)       MEDICATIONS:   Current Outpatient Medications   Medication Sig Dispense Refill     Ferrous Sulfate (IRON) 325 (65 Fe) MG tablet Take 1 tablet by mouth daily       Prenatal Vit-Fe Fumarate-FA (PRENATAL MULTIVITAMIN W/IRON) 27-0.8 MG tablet Take 1 tablet by mouth daily       UNABLE TO FIND  "MEDICATION NAME: Comfort Tone          FAMILY HISTORY:   Family History   Problem Relation Age of Onset     Myocardial Infarction Maternal Grandfather      Other - See Comments Maternal Grandfather         stroke     Other - See Comments Maternal Uncle         stroke       SOCIAL HISTORY:   Social History     Socioeconomic History     Marital status:      Spouse name: None     Number of children: None     Years of education: None     Highest education level: None   Occupational History     None   Social Needs     Financial resource strain: None     Food insecurity:     Worry: None     Inability: None     Transportation needs:     Medical: None     Non-medical: None   Tobacco Use     Smoking status: Former Smoker     Last attempt to quit: 2014     Years since quittin.2     Smokeless tobacco: Never Used   Substance and Sexual Activity     Alcohol use: No     Frequency: Never     Drug use: No     Sexual activity: Yes     Birth control/protection: None     Comment: pregnant   Lifestyle     Physical activity:     Days per week: None     Minutes per session: None     Stress: None   Relationships     Social connections:     Talks on phone: None     Gets together: None     Attends Cheondoism service: None     Active member of club or organization: None     Attends meetings of clubs or organizations: None     Relationship status: None     Intimate partner violence:     Fear of current or ex partner: None     Emotionally abused: None     Physically abused: None     Forced sexual activity: None   Other Topics Concern     Parent/sibling w/ CABG, MI or angioplasty before 65F 55M? Not Asked   Social History Narrative     None       PHYSICAL EXAMINATION:   /69   Pulse 67   Temp 97.8  F (36.6  C)   Resp 20   Ht 1.702 m (5' 7\")   Wt 74.4 kg (164 lb)   LMP 2018 (Approximate)   SpO2 100%   BMI 25.69 kg/m     Wt Readings from Last 10 Encounters:   19 74.4 kg (164 lb)   19 72.1 kg (159 lb) "   02/15/19 70.8 kg (156 lb)   01/28/19 66.7 kg (147 lb)   01/25/19 66.6 kg (146 lb 14.4 oz)   01/16/19 67.1 kg (148 lb)   01/11/19 66.7 kg (147 lb)   12/31/18 67.2 kg (148 lb 4 oz)   12/21/18 66 kg (145 lb 8 oz)   12/17/18 64.9 kg (143 lb)      Exam:  Constitutional: healthy, alert and no distress  Head: Normocephalic.   Neck: Neck supple. No adenopathy.  ENT: no sinus tenderness  Cardiovascular:  RRR.   Respiratory:  Lungs clear  Gastrointestinal: Abdomen soft, non-tender. BS normal.   Musculoskeletal: extremities normal  Skin: no suspicious lesions or rashes  Neurologic: Gait normal. Sensation grossly WNL.  Psychiatric: mentation appears normal and affect normal/bright  Hematologic/Lymphatic/Immunologic: Normal cervical lymph nodes        LABORATORY RESULTS:    Recent Labs   Lab Test 02/18/19  1344 12/17/18  1110    138   POTASSIUM 3.9 3.6   CHLORIDE 104 106   BUN 9 11   CR 0.39* 0.53   GLC 98 93   BOB 8.8 8.2*     Recent Labs   Lab Test 02/15/19  1648 01/28/19  0825 12/17/18  1110   WBC 15.9*  --  10.5   HGB 11.7 11.1* 11.0*   *  --  160   MCV 99  --  97   NEUTROPHIL  --   --  74.9     Recent Labs   Lab Test 02/18/19  1344 12/17/18  1110   BILITOTAL 0.3 0.3   ALKPHOS 102 46   ALT 23 10   AST 23 14   ALBUMIN 2.9* 2.8*         ASSESSMENT AND PLAN:    34-year-old female with history of gallstones and Currently in Third trimester of pregnancy who presents to hematology clinic today for further evaluation of incidentally noted mild thrombocytopenia.      Thrombocytopenia  in pregnancy  Discussed in detail with patient and  present and likely due to another etiology associated with thrombocytopenia in pregnancy. Given mild thrombocytopenia along with normal hepatic/renal function as well as normal platelet count in the past, clinical impression is likely consistent with Gestational thrombocytopenia which accounts for 70- 80% of cases of thrombocytopenia during pregnancy and is typically  characterized by platelet count above 100 K. Commonly occurs in mid second to third trimester. Exact mechanism is unknown and possibilities include dilutional Vs consumption/pooling of platelets in placenta. There are no confirmatory tests as it is a diagnosis of exclusion.   She was informed that it typically resolves within 6 weeks postpartum but may recur with subsequent pregnancies. Also it is not associated with  thrombocytopenia.    We will order repeat cbc with differential along with smear morphology review, reticulocyte count, chemistry panel, PT/PTT as initial work up.  If she continues to have mild cytopenia along with no abnormalities on lab today, no additional workup required and  recommendation would be observation      - Neutrophilia  Likely secondary to pregnancy   Smear morphology review      We will contact her once results are available with final recommendations.    The patient is ready to learn, no apparent learning barriers were identified, Diagnosis and treatment plans were explained to the patient. The patient expressed understanding of the content. The patient questions were answered to her satisfaction.    Chart documentation with Dragon Voice recognition Software. Although reviewed after completion, some words and grammatical errors may remain.    Gregg Farnsworth MD  Attending Physician   Hematology/Medical Oncology        Again, thank you for allowing me to participate in the care of your patient.        Sincerely,        Gregg Farnsworth MD

## 2019-03-11 NOTE — NURSING NOTE
"Oncology Rooming Note    March 11, 2019 9:40 AM   Ethan Watt is a 34 year old female who presents for:    Chief Complaint   Patient presents with     Oncology Clinic Visit     New patient      Initial Vitals: /69   Pulse 67   Temp 97.8  F (36.6  C)   Resp 20   Ht 1.702 m (5' 7\")   Wt 74.4 kg (164 lb)   LMP 07/26/2018 (Approximate)   SpO2 100%   BMI 25.69 kg/m   Estimated body mass index is 25.69 kg/m  as calculated from the following:    Height as of this encounter: 1.702 m (5' 7\").    Weight as of this encounter: 74.4 kg (164 lb). Body surface area is 1.88 meters squared.  No Pain (0) Comment: Data Unavailable   Patient's last menstrual period was 07/26/2018 (approximate).  Allergies reviewed: Yes  Medications reviewed: Yes    Medications: Medication refills not needed today.  Pharmacy name entered into [facility]: [facility] - [place], [place] - [address]         Nena Soares LPN              "

## 2019-03-11 NOTE — PROGRESS NOTES
DATE OF VISIT: Mar 11, 2019    REASON FOR REFERRAL:   Thrombocytopenia in pregnancy      HISTORY OF PRESENT ILLNESS:     34-year-old female with past medical history significant for gallstones and presents to hematology clinic today for further evaluation of incidentally noted mild thrombocytopenia.    Patient is  and currently in her third trimester of pregnancy who had a routine follow-up with Obstetrics last month and was noted to be thrombocytopenic with platelets at 133K. Prior laboratory workup in 2018 showed normal platelet count.     Patient denies any history of prior thrombocytopenia or bleeding/bruising symptoms. Has noticed infrequent nose bleeds for the past one to 2 months. She denies bleeding otherwise, petechia, excess bruising, bleeding with prior surgeries, Family history of blood disorders, liver disease, Skin rash, joint pain, Miscarriages, history of gastric ulcer, infections, fevers/chills, antibiotic use Or any other complaints.    REVIEW OF SYSTEMS:      ROS: 14 point ROS neg other than the symptoms noted above in the HPI.    PAST MEDICAL HISTORY:   Gall stones    PAST SURGICAL HISTORY:   Past Surgical History:   Procedure Laterality Date     LAPAROSCOPIC CHOLECYSTECTOMY N/A 2019    Procedure: LAPAROSCOPIC CHOLECYSTECTOMY;  Surgeon: Davey Rinaldi MD;  Location:  OR       ALLERGIES:   Allergies as of 2019     (No Known Allergies)       MEDICATIONS:   Current Outpatient Medications   Medication Sig Dispense Refill     Ferrous Sulfate (IRON) 325 (65 Fe) MG tablet Take 1 tablet by mouth daily       Prenatal Vit-Fe Fumarate-FA (PRENATAL MULTIVITAMIN W/IRON) 27-0.8 MG tablet Take 1 tablet by mouth daily       UNABLE TO FIND MEDICATION NAME: Comfort Tone          FAMILY HISTORY:   Family History   Problem Relation Age of Onset     Myocardial Infarction Maternal Grandfather      Other - See Comments Maternal Grandfather         stroke     Other - See Comments Maternal  "Uncle         stroke       SOCIAL HISTORY:   Social History     Socioeconomic History     Marital status:      Spouse name: None     Number of children: None     Years of education: None     Highest education level: None   Occupational History     None   Social Needs     Financial resource strain: None     Food insecurity:     Worry: None     Inability: None     Transportation needs:     Medical: None     Non-medical: None   Tobacco Use     Smoking status: Former Smoker     Last attempt to quit: 2014     Years since quittin.2     Smokeless tobacco: Never Used   Substance and Sexual Activity     Alcohol use: No     Frequency: Never     Drug use: No     Sexual activity: Yes     Birth control/protection: None     Comment: pregnant   Lifestyle     Physical activity:     Days per week: None     Minutes per session: None     Stress: None   Relationships     Social connections:     Talks on phone: None     Gets together: None     Attends [employer] service: None     Active member of club or organization: None     Attends meetings of clubs or organizations: None     Relationship status: None     Intimate partner violence:     Fear of current or ex partner: None     Emotionally abused: None     Physically abused: None     Forced sexual activity: None   Other Topics Concern     Parent/sibling w/ CABG, MI or angioplasty before 65F 55M? Not Asked   Social History Narrative     None       PHYSICAL EXAMINATION:   /69   Pulse 67   Temp 97.8  F (36.6  C)   Resp 20   Ht 1.702 m (5' 7\")   Wt 74.4 kg (164 lb)   LMP 2018 (Approximate)   SpO2 100%   BMI 25.69 kg/m    Wt Readings from Last 10 Encounters:   19 74.4 kg (164 lb)   19 72.1 kg (159 lb)   02/15/19 70.8 kg (156 lb)   19 66.7 kg (147 lb)   19 66.6 kg (146 lb 14.4 oz)   19 67.1 kg (148 lb)   19 66.7 kg (147 lb)   18 67.2 kg (148 lb 4 oz)   18 66 kg (145 lb 8 oz)   18 64.9 kg (143 lb)    "   Exam:  Constitutional: healthy, alert and no distress  Head: Normocephalic.   Neck: Neck supple. No adenopathy.  ENT: no sinus tenderness  Cardiovascular:  RRR.   Respiratory:  Lungs clear  Gastrointestinal: Abdomen soft, non-tender. BS normal.   Musculoskeletal: extremities normal  Skin: no suspicious lesions or rashes  Neurologic: Gait normal. Sensation grossly WNL.  Psychiatric: mentation appears normal and affect normal/bright  Hematologic/Lymphatic/Immunologic: Normal cervical lymph nodes        LABORATORY RESULTS:    Recent Labs   Lab Test 02/18/19  1344 12/17/18  1110    138   POTASSIUM 3.9 3.6   CHLORIDE 104 106   BUN 9 11   CR 0.39* 0.53   GLC 98 93   BOB 8.8 8.2*     Recent Labs   Lab Test 02/15/19  1648 01/28/19  0825 12/17/18  1110   WBC 15.9*  --  10.5   HGB 11.7 11.1* 11.0*   *  --  160   MCV 99  --  97   NEUTROPHIL  --   --  74.9     Recent Labs   Lab Test 02/18/19  1344 12/17/18  1110   BILITOTAL 0.3 0.3   ALKPHOS 102 46   ALT 23 10   AST 23 14   ALBUMIN 2.9* 2.8*         ASSESSMENT AND PLAN:    34-year-old female with history of gallstones and Currently in Third trimester of pregnancy who presents to hematology clinic today for further evaluation of incidentally noted mild thrombocytopenia.      Thrombocytopenia  in pregnancy  Discussed in detail with patient and  present and likely due to another etiology associated with thrombocytopenia in pregnancy. Given mild thrombocytopenia along with normal hepatic/renal function as well as normal platelet count in the past, clinical impression is likely consistent with Gestational thrombocytopenia which accounts for 70- 80% of cases of thrombocytopenia during pregnancy and is typically characterized by platelet count above 100 K. Commonly occurs in mid second to third trimester. Exact mechanism is unknown and possibilities include dilutional Vs consumption/pooling of platelets in placenta. There are no confirmatory tests as it is a  diagnosis of exclusion.   She was informed that it typically resolves within 6 weeks postpartum but may recur with subsequent pregnancies. Also it is not associated with  thrombocytopenia.    We will order repeat cbc with differential along with smear morphology review, reticulocyte count, chemistry panel, PT/PTT as initial work up.  If she continues to have mild cytopenia along with no abnormalities on lab today, no additional workup required and  recommendation would be observation      - Neutrophilia  Likely secondary to pregnancy   Smear morphology review      We will contact her once results are available with final recommendations.    The patient is ready to learn, no apparent learning barriers were identified, Diagnosis and treatment plans were explained to the patient. The patient expressed understanding of the content. The patient questions were answered to her satisfaction.    Chart documentation with Dragon Voice recognition Software. Although reviewed after completion, some words and grammatical errors may remain.    Gregg Farnsworth MD  Attending Physician   Hematology/Medical Oncology

## 2019-03-12 LAB — COPATH REPORT: NORMAL

## 2019-03-14 ENCOUNTER — PATIENT OUTREACH (OUTPATIENT)
Dept: ONCOLOGY | Facility: CLINIC | Age: 34
End: 2019-03-14

## 2019-03-17 ENCOUNTER — NURSE TRIAGE (OUTPATIENT)
Dept: NURSING | Facility: CLINIC | Age: 34
End: 2019-03-17

## 2019-03-18 NOTE — TELEPHONE ENCOUNTER
Pt believes she lost her mucous plug this evening, one teaspoon of red/brown mucous discharge seen.  She denies having other symptoms at present time.     Disposition:  Page provider  8:22PM:  Smart Web used to page on-call Dr. Soledad Sanchez to call pt directly at [phone].  Advised pt to call FNA back if no response from provider within 20 minutes.  She verbalized understanding and had no further questions.     Cammy Melvin RN/[facility]    Reason for Disposition    [1] Pregnant 24-36 weeks () AND [2] pinkish or brownish mucous discharge    Additional Information    Negative: [1] Vaginal bleeding AND [2] pregnant > 20 weeks    Negative: [1] Vaginal bleeding AND [2] pregnant < 20 weeks    Negative: [1] Having contractions or other symptoms of labor AND [2] < 37 weeks pregnant (i.e., )    Negative: [1] Having contractions or other symptoms of labor AND [2] > 36 weeks pregnant (i.e., term pregnancy)    Negative: Leakage of fluid (trickle, gush) from the vagina    Negative: Foreign body in vagina (e.g., tampon)    Negative: Pain or burning with urination is main symptom    Negative: [1] Pregnant 23 or more weeks AND [2] baby is moving less today (e.g., kick count < 5 in 1 hour or < 10 in 2 hours)    Negative: Patient sounds very sick or weak to the triager    Negative: [1] Constant abdominal pain AND [2] present > 2 hours    Negative: [1] Intermittent lower abdominal pain AND [2] present > 24 hours    Protocols used: PREGNANCY - VAGINAL DISCHARGE-ADULT-

## 2019-03-19 ENCOUNTER — PRENATAL OFFICE VISIT (OUTPATIENT)
Dept: OBGYN | Facility: OTHER | Age: 34
End: 2019-03-19
Payer: COMMERCIAL

## 2019-03-19 VITALS
SYSTOLIC BLOOD PRESSURE: 102 MMHG | BODY MASS INDEX: 25.84 KG/M2 | HEART RATE: 72 BPM | DIASTOLIC BLOOD PRESSURE: 62 MMHG | WEIGHT: 165 LBS

## 2019-03-19 DIAGNOSIS — Z67.91 RH NEGATIVE STATE IN ANTEPARTUM PERIOD: ICD-10-CM

## 2019-03-19 DIAGNOSIS — Z34.03 SUPERVISION OF NORMAL FIRST PREGNANCY IN THIRD TRIMESTER: Primary | ICD-10-CM

## 2019-03-19 DIAGNOSIS — O26.899 RH NEGATIVE STATE IN ANTEPARTUM PERIOD: ICD-10-CM

## 2019-03-19 DIAGNOSIS — D69.6 THROMBOCYTOPENIA (H): ICD-10-CM

## 2019-03-19 PROCEDURE — 99207 ZZC PRENATAL VISIT: CPT | Performed by: ADVANCED PRACTICE MIDWIFE

## 2019-03-19 NOTE — NURSING NOTE
"Chief Complaint   Patient presents with     Prenatal Care       Initial /62 (BP Location: Left arm, Patient Position: Sitting, Cuff Size: Adult Regular)   Pulse 72   Wt 74.8 kg (165 lb)   LMP 07/26/2018 (Approximate)   BMI 25.84 kg/m   Estimated body mass index is 25.84 kg/m  as calculated from the following:    Height as of 3/11/19: 1.702 m (5' 7\").    Weight as of this encounter: 74.8 kg (165 lb).  Medication Reconciliation: complete    Fallon Stevens CMA    "

## 2019-03-19 NOTE — PROGRESS NOTES
Feeling ok.  Wonders if she recently lost her mucus plug.  No  Contra/lof/vb  Discussed platelets.    Will breast feed.  Encouraged BF class.   Boy and will circ.   Has peds.  Uncertain about BC.   Reviewed GBS for next visit, TDAP for  and breech tilts and ECV if not turned by 37 weeks. \  RTC 2 w.  Padmini Delgado, APRN, CNM

## 2019-03-30 ENCOUNTER — NURSE TRIAGE (OUTPATIENT)
Dept: NURSING | Facility: CLINIC | Age: 34
End: 2019-03-30

## 2019-03-30 NOTE — TELEPHONE ENCOUNTER
Patient is 35w2d.  Is concerned as she experienced a sharp left groin pain that last 15-20 minutes.  FNA paged on call provider, Dr. THOR De Dios, via Smart Web at 2:36PM to contact patient at [phone] and instructed patient to call back if no response in 20 minutes.

## 2019-04-01 ENCOUNTER — PRENATAL OFFICE VISIT (OUTPATIENT)
Dept: OBGYN | Facility: OTHER | Age: 34
End: 2019-04-01
Payer: COMMERCIAL

## 2019-04-01 VITALS
WEIGHT: 167 LBS | SYSTOLIC BLOOD PRESSURE: 108 MMHG | BODY MASS INDEX: 26.16 KG/M2 | HEART RATE: 78 BPM | DIASTOLIC BLOOD PRESSURE: 62 MMHG

## 2019-04-01 DIAGNOSIS — O26.899 RH NEGATIVE STATE IN ANTEPARTUM PERIOD: ICD-10-CM

## 2019-04-01 DIAGNOSIS — Z34.93 NORMAL PREGNANCY IN THIRD TRIMESTER: Primary | ICD-10-CM

## 2019-04-01 DIAGNOSIS — Z67.91 RH NEGATIVE STATE IN ANTEPARTUM PERIOD: ICD-10-CM

## 2019-04-01 DIAGNOSIS — D69.6 THROMBOCYTOPENIA (H): ICD-10-CM

## 2019-04-01 PROCEDURE — 99207 ZZC PRENATAL VISIT: CPT | Performed by: OBSTETRICS & GYNECOLOGY

## 2019-04-01 NOTE — PROGRESS NOTES
34 year old  at 35w4d weeks presents to the clinic for a routine prenatal visit.    No concerns.  Patient denies any vaginal bleeding, leakage of fluid, or contractions     Good fetal movement  Fundal height=35cm  UQKh=192's  Gestational thrombocytopenia=last Plts were 129 on 3/11  Discussed labor precautions.  RTC 2 weeks    Sherly De Dios

## 2019-04-11 ENCOUNTER — PRENATAL OFFICE VISIT (OUTPATIENT)
Dept: OBGYN | Facility: OTHER | Age: 34
End: 2019-04-11
Payer: COMMERCIAL

## 2019-04-11 VITALS
DIASTOLIC BLOOD PRESSURE: 70 MMHG | WEIGHT: 169.75 LBS | BODY MASS INDEX: 26.59 KG/M2 | SYSTOLIC BLOOD PRESSURE: 114 MMHG | HEART RATE: 80 BPM

## 2019-04-11 DIAGNOSIS — O99.113 BENIGN GESTATIONAL THROMBOCYTOPENIA IN THIRD TRIMESTER (H): Primary | ICD-10-CM

## 2019-04-11 DIAGNOSIS — D69.6 BENIGN GESTATIONAL THROMBOCYTOPENIA IN THIRD TRIMESTER (H): Primary | ICD-10-CM

## 2019-04-11 DIAGNOSIS — Z67.91 RH NEGATIVE STATE IN ANTEPARTUM PERIOD: ICD-10-CM

## 2019-04-11 DIAGNOSIS — O26.899 RH NEGATIVE STATE IN ANTEPARTUM PERIOD: ICD-10-CM

## 2019-04-11 LAB
ERYTHROCYTE [DISTWIDTH] IN BLOOD BY AUTOMATED COUNT: 13.4 % (ref 10–15)
HCT VFR BLD AUTO: 37.4 % (ref 35–47)
HGB BLD-MCNC: 12.7 G/DL (ref 11.7–15.7)
MCH RBC QN AUTO: 33.1 PG (ref 26.5–33)
MCHC RBC AUTO-ENTMCNC: 34 G/DL (ref 31.5–36.5)
MCV RBC AUTO: 97 FL (ref 78–100)
PLATELET # BLD AUTO: 95 10E9/L (ref 150–450)
RBC # BLD AUTO: 3.84 10E12/L (ref 3.8–5.2)
WBC # BLD AUTO: 16.1 10E9/L (ref 4–11)

## 2019-04-11 PROCEDURE — 36415 COLL VENOUS BLD VENIPUNCTURE: CPT | Performed by: OBSTETRICS & GYNECOLOGY

## 2019-04-11 PROCEDURE — 87653 STREP B DNA AMP PROBE: CPT | Performed by: OBSTETRICS & GYNECOLOGY

## 2019-04-11 PROCEDURE — 85027 COMPLETE CBC AUTOMATED: CPT | Performed by: OBSTETRICS & GYNECOLOGY

## 2019-04-11 PROCEDURE — 99207 ZZC PRENATAL VISIT: CPT | Performed by: OBSTETRICS & GYNECOLOGY

## 2019-04-11 ASSESSMENT — PATIENT HEALTH QUESTIONNAIRE - PHQ9
10. IF YOU CHECKED OFF ANY PROBLEMS, HOW DIFFICULT HAVE THESE PROBLEMS MADE IT FOR YOU TO DO YOUR WORK, TAKE CARE OF THINGS AT HOME, OR GET ALONG WITH OTHER PEOPLE: NOT DIFFICULT AT ALL
SUM OF ALL RESPONSES TO PHQ QUESTIONS 1-9: 3
SUM OF ALL RESPONSES TO PHQ QUESTIONS 1-9: 3

## 2019-04-11 NOTE — NURSING NOTE
"Chief Complaint   Patient presents with     Prenatal Care       Initial /70 (BP Location: Right arm, Patient Position: Chair, Cuff Size: Adult Regular)   Pulse 80   Wt 77 kg (169 lb 12 oz)   LMP 2018 (Approximate)   BMI 26.59 kg/m   Estimated body mass index is 26.59 kg/m  as calculated from the following:    Height as of 3/11/19: 1.702 m (5' 7\").    Weight as of this encounter: 77 kg (169 lb 12 oz).  BP completed using cuff size: regular        PHQ-9 score:    PHQ-9 SCORE 2019   PHQ-9 Total Score MyChart 3 (Minimal depression)   PHQ-9 Total Score 3       Agata Galvez, [facility]  2019          "

## 2019-04-11 NOTE — PROGRESS NOTES
Presents for routine  appointment.     No complaints.    No LOF/VB/Ctxs.    ROS:   and GI  negative.     Please see Prenatal Vitals and Notes Flowsheet for objective data.  BSUS confirms vertex, but head is not engaged.     A/P:  34 year old  at 37w0d       ICD-10-CM    1. Benign gestational thrombocytopenia in third trimester (H) O99.113 Strep, Group B by PCR    D69.6 CBC with platelets   2. Rh negative state in antepartum period O09.899 rho(D) immune globulin (HYPERRHO/RHOGAM) injection 300 mcg    Z67.91        Group B Strep collected today  Lap ever in =Rhogam at 24 weeks=due at 36 weeks  Discussed labor and what to expect. Discussed when to go to the birth center.  Gestational thrombocytopenia=last Plts were 129 on 3/11.  Plan repeat today  Follow up in 1 week.      Chelsey French MD        Answers for HPI/ROS submitted by the patient on 2019   If you checked off any problems, how difficult have these problems made it for you to do your work, take care of things at home, or get along with other people?: Not difficult at all  PHQ9 TOTAL SCORE: 3

## 2019-04-12 LAB
GP B STREP DNA SPEC QL NAA+PROBE: NEGATIVE
SPECIMEN SOURCE: NORMAL

## 2019-04-12 ASSESSMENT — PATIENT HEALTH QUESTIONNAIRE - PHQ9: SUM OF ALL RESPONSES TO PHQ QUESTIONS 1-9: 3

## 2019-04-15 ENCOUNTER — PRENATAL OFFICE VISIT (OUTPATIENT)
Dept: OBGYN | Facility: OTHER | Age: 34
End: 2019-04-15
Payer: COMMERCIAL

## 2019-04-15 VITALS
HEART RATE: 78 BPM | DIASTOLIC BLOOD PRESSURE: 58 MMHG | BODY MASS INDEX: 27.1 KG/M2 | SYSTOLIC BLOOD PRESSURE: 110 MMHG | WEIGHT: 173 LBS

## 2019-04-15 DIAGNOSIS — D69.6 THROMBOCYTOPENIA (H): ICD-10-CM

## 2019-04-15 DIAGNOSIS — Z67.91 RH NEGATIVE STATE IN ANTEPARTUM PERIOD: ICD-10-CM

## 2019-04-15 DIAGNOSIS — O26.899 RH NEGATIVE STATE IN ANTEPARTUM PERIOD: ICD-10-CM

## 2019-04-15 DIAGNOSIS — Z34.93 NORMAL PREGNANCY IN THIRD TRIMESTER: Primary | ICD-10-CM

## 2019-04-15 LAB
ERYTHROCYTE [DISTWIDTH] IN BLOOD BY AUTOMATED COUNT: 13.3 % (ref 10–15)
HCT VFR BLD AUTO: 36 % (ref 35–47)
HGB BLD-MCNC: 12 G/DL (ref 11.7–15.7)
MCH RBC QN AUTO: 32.6 PG (ref 26.5–33)
MCHC RBC AUTO-ENTMCNC: 33.3 G/DL (ref 31.5–36.5)
MCV RBC AUTO: 98 FL (ref 78–100)
PLATELET # BLD AUTO: 92 10E9/L (ref 150–450)
RBC # BLD AUTO: 3.68 10E12/L (ref 3.8–5.2)
WBC # BLD AUTO: 12.7 10E9/L (ref 4–11)

## 2019-04-15 PROCEDURE — 59426 ANTEPARTUM CARE ONLY: CPT | Performed by: OBSTETRICS & GYNECOLOGY

## 2019-04-15 PROCEDURE — 99207 ZZC PRENATAL VISIT: CPT | Performed by: OBSTETRICS & GYNECOLOGY

## 2019-04-15 PROCEDURE — 36415 COLL VENOUS BLD VENIPUNCTURE: CPT | Performed by: OBSTETRICS & GYNECOLOGY

## 2019-04-15 PROCEDURE — 85027 COMPLETE CBC AUTOMATED: CPT | Performed by: OBSTETRICS & GYNECOLOGY

## 2019-04-15 NOTE — PROGRESS NOTES
34 year old  at 37w4d weeks presents to the clinic for a routine prenatal visit.  No concerns.  Complains of feeling more pressure.  No vaginal bleeding, leakage of fluid, or contractions   Fundal height=39cm  DEJr=160  CX=/-3  Thrombocytopenia=labs again today.  Plts=95 on   Discussed labor precautions  RTC 1 week  GBS=Negative    Sherly De Dios

## 2019-04-15 NOTE — PATIENT INSTRUCTIONS
If you have any questions regarding your visit, Please contact your care team.    Women [facility] CLINIC HOURS TELEPHONE NUMBER   Sherly De Dios M.D.    Dianne Valdovinos -         Monday-[facility]  7:00 am - 5 pm Monday's Tuesday- [facility]  8:00am- 5 pm  Wednesday- Off  Thursday- Offf Friday-Am [place], and [facility]  [place] 8:00-11:30 AM  [place] 1:00-5:00 PM [facility]  [address] [place] [address]  [phone] ask for [facility]    Imaging [phone]    [facility]  [address]  [place], [place] [address] [phone]  Imaging [phone]    [facility]  [address] [place], [place] [id number] [phone]  Imaging Scheduling - [phone]     Urgent Care locations:    [facility] Saturday and Sunday   9 am - 5 pm    Monday-Friday   12 pm - 8 pm  Saturday and Sunday   9 am - 5 pm   [phone]       If you need a medication refill, please contact your pharmacy. Please allow 3 business days for your refill to be completed.  As always, Thank you for trusting us with your healthcare needs!

## 2019-04-18 ENCOUNTER — TELEPHONE (OUTPATIENT)
Dept: FAMILY MEDICINE | Facility: CLINIC | Age: 34
End: 2019-04-18

## 2019-04-18 NOTE — TELEPHONE ENCOUNTER
Please let patient know that we will likely start steroids a week prior to delivery to help increase her platelet count.  I will discuss this with patient at her upcoming appointment.   Thanks.    Sherly De Dios

## 2019-04-18 NOTE — TELEPHONE ENCOUNTER
"Per CBC result note:  Notes recorded by Sherly De Dios DO on 4/16/2019 at 1:16 PM CDT  \"I called and spoke to patient.  All questions answered and patient verbalizes understanding.\" Sherly De Dios    Spoke to pt and relayed below message from Dr. De Dios.  Pt did have some more questions regarding her platelets and the steriods.  Advised to write them down and discuss further with Dr. De Dios at her next prenatal OV on 4/22.  Pt verbalized understanding and agreed to plan.    Lakeisha Mcneal RN        "

## 2019-04-18 NOTE — TELEPHONE ENCOUNTER
Reason for Call:  Request for results:    Name of test or procedure: plateletts    Date of test of procedure: 4-15-19    Location of the test or procedure: ER    OK to leave the result message on voice mail or with a family member? YES    Phone number Patient can be reached at:  Home number on file [phone] (home)    Additional comments: Patient would like to know if Dr. De Dios has heard back from the hemologist.    Call taken on 4/18/2019 at 10:47 AM by Alejo Quan

## 2019-04-21 ENCOUNTER — NURSE TRIAGE (OUTPATIENT)
Dept: NURSING | Facility: CLINIC | Age: 34
End: 2019-04-21

## 2019-04-21 NOTE — TELEPHONE ENCOUNTER
"Patient states she thinks she passed her mucous plug this morning.  It had a pink tinge to it and it a pink-red streak in mucous.  She said there was  \"quite a bit when she wiped\" and a little mucous in toilet     She has an appointment tomorrow    Fetus is normally active, although, maybe a little less than previous.  She wonders if there is just less room for him to move  No leaking of vaginal fluid  Having a little irregular cramping.      Protocol and care advice reviewed  Caller states understanding of the recommended home care.  Reviewed feeling for fetal movement by lying down in a quiet room, without TV or cell phone, putting her hands on her abdomen and feeling for movements.  Advised to call back if further questions or concerns    Aruna Pacheco RN / [facility]      Additional Information    Negative: [1] Pregnant 23 or more weeks AND [2] baby is moving less today (e.g., kick count < 5 in 1 hour or < 10 in 2 hours)    Negative: [1] Vaginal bleeding AND [2] pregnant > 20 weeks    Negative: [1] Vaginal bleeding AND [2] pregnant < 20 weeks    Negative: [1] Having contractions or other symptoms of labor AND [2] < 37 weeks pregnant (i.e., )    Negative: [1] Having contractions or other symptoms of labor AND [2] > 36 weeks pregnant (i.e., term pregnancy)    Negative: Leakage of fluid (trickle, gush) from the vagina    Negative: Foreign body in vagina (e.g., tampon)    Negative: Pain or burning with urination is main symptom    Negative: Patient sounds very sick or weak to the triager    Negative: [1] Constant abdominal pain AND [2] present > 2 hours    Negative: [1] Intermittent lower abdominal pain AND [2] present > 24 hours    Negative: [1] Pregnant 24-36 weeks () AND [2] pinkish or brownish mucous discharge    Negative: [1] Yellow or green vaginal discharge AND [2] fever    Negative: Painful rash with tiny water blisters    Negative: [1] Rash (e.g., redness, tiny bumps, sore) of " "genital area AND [2] present > 24 hours    Negative: Abnormal color vaginal discharge (i.e., yellow, green, gray)    Negative: Bad smelling vaginal discharge    Negative: Tender lump (swelling or \"ball\") at vaginal opening    Negative: [1] Symptoms of a \"yeast infection\" (i.e., itchy, white discharge, not bad smelling) AND [2] not improved > 3 days following CARE ADVICE    Negative: Patient is worried about sexually transmitted disease (STD)    Negative: Pain with sexual intercourse (dyspareunia)    [1] Pregnant > 36 weeks (term) AND [2] pinkish or brownish mucous discharge (all triage questions negative)    Protocols used: PREGNANCY - VAGINAL DISCHARGE-ADULT-      "

## 2019-04-22 NOTE — TELEPHONE ENCOUNTER
" calling. 2nd call tonight. Patient with contractions about 6 min 45 seconds apart, lasting 1 minute. Per guideline to go in if <6 minute x 2 hours but caller reports it is a 45 minute drive. Told caller if more comfortable going in now that they can.     Reason for Disposition    [1] First baby (primipara) AND [2] contractions < 6 minutes apart  AND [3] present 2 hours    Additional Information    Negative: Passed out (i.e., lost consciousness, collapsed and was not responding)    Negative: Shock suspected (e.g., cold/pale/clammy skin, too weak to stand, low BP, rapid pulse)    Negative: Difficult to awaken or acting confused  (e.g., disoriented, slurred speech)    Negative: [1] SEVERE abdominal pain (e.g., excruciating) AND [2] constant AND [3] present > 1 hour    Negative: Severe bleeding (e.g., continuous red blood from vagina, or large blood clots)    Negative: Umbilical cord hanging out of the vagina (shiny, white, curled appearance, \"like telephone cord\")    Negative: Uncontrollable urge to push (i.e., feels like baby is coming out now)    Negative: Can see baby    Negative: Sounds like a life-threatening emergency to the triager    Negative: Pregnant < 37 weeks (i.e., )    Negative: [1] Uncertain delivery date AND [2] possibly pregnant < 37 weeks (i.e., )    Protocols used: PREGNANCY - LABOR-ADULT-AH      "

## 2019-04-22 NOTE — TELEPHONE ENCOUNTER
"38w3d, , Dr De Dios ([facility]) Lost mucous plug earlier today. Tonight reports contractions x6h, occurring every 5-7 min apart for past 1 hr. Passed some bloody show (blood tinged mucous) but no other vaginal bleeding. No gush or leakage of fluid. Baby moving well. Pt feeling well. Advised home care; disc'd care advice. Advised c/b when contractions 6 min apart x2 hrs. cb right away if gush/leakage or bleeding, DFM or other concerns. Pt voiced understanding and agreement. Anna Newton RN/FNA        Additional Information    Negative: Passed out (i.e., lost consciousness, collapsed and was not responding)    Negative: Shock suspected (e.g., cold/pale/clammy skin, too weak to stand, low BP, rapid pulse)    Negative: Difficult to awaken or acting confused  (e.g., disoriented, slurred speech)    Negative: [1] SEVERE abdominal pain (e.g., excruciating) AND [2] constant AND [3] present > 1 hour    Negative: Severe bleeding (e.g., continuous red blood from vagina, or large blood clots)    Negative: Umbilical cord hanging out of the vagina (shiny, white, curled appearance, \"like telephone cord\")    Negative: Uncontrollable urge to push (i.e., feels like baby is coming out now)    Negative: Can see baby    Negative: Sounds like a life-threatening emergency to the triager    Negative: Pregnant < 37 weeks (i.e., )    Negative: [1] Uncertain delivery date AND [2] possibly pregnant < 37 weeks (i.e., )    Negative: [1] First baby (primipara) AND [2] contractions < 6 minutes apart  AND [3] present 2 hours    Negative: [1] History of prior delivery (multipara) AND [2] contractions < 10 minutes apart AND [3] present 1 hour    Negative: [1] History of rapid prior delivery AND [2] contractions < 10 minutes apart    Negative: [1] Leakage of fluid from vagina AND [2] green or brown in color    Negative: [1] Leakage of fluid from vagina AND [2] leakage started > 4 hours ago    Negative: Vaginal bleeding or spotting    " Negative: Baby moving less today (e.g., kick count < 5 in 1 hour or < 10 in 2 hours)    Negative: New hand or face swelling    Negative: MODERATE-SEVERE abdominal pain    Negative: Fever > 100.4 F (38.0 C)    Negative: [1] Leakage of fluid from vagina AND [2] leakage started < 4 hours ago    Negative: Patient sounds very sick or weak to the triager    [1] First baby (primipara) AND [2] contractions > 5 minutes apart, or for < 2 hours     (all triage questions negative)    Protocols used: PREGNANCY - LABOR-ADULT-

## 2019-06-07 ENCOUNTER — PRENATAL OFFICE VISIT (OUTPATIENT)
Dept: OBGYN | Facility: OTHER | Age: 34
End: 2019-06-07
Payer: COMMERCIAL

## 2019-06-07 VITALS
HEART RATE: 64 BPM | WEIGHT: 151 LBS | BODY MASS INDEX: 23.65 KG/M2 | SYSTOLIC BLOOD PRESSURE: 92 MMHG | DIASTOLIC BLOOD PRESSURE: 60 MMHG

## 2019-06-07 DIAGNOSIS — D69.6 THROMBOCYTOPENIA (H): ICD-10-CM

## 2019-06-07 PROBLEM — O26.899 RH NEGATIVE STATE IN ANTEPARTUM PERIOD: Status: RESOLVED | Noted: 2018-12-31 | Resolved: 2019-06-07

## 2019-06-07 PROBLEM — Z23 NEED FOR TDAP VACCINATION: Status: RESOLVED | Noted: 2018-12-31 | Resolved: 2019-06-07

## 2019-06-07 PROBLEM — Z34.92 NORMAL PREGNANCY IN SECOND TRIMESTER: Status: RESOLVED | Noted: 2018-12-31 | Resolved: 2019-06-07

## 2019-06-07 PROBLEM — Z67.91 RH NEGATIVE STATE IN ANTEPARTUM PERIOD: Status: RESOLVED | Noted: 2018-12-31 | Resolved: 2019-06-07

## 2019-06-07 LAB
ERYTHROCYTE [DISTWIDTH] IN BLOOD BY AUTOMATED COUNT: 12.2 % (ref 10–15)
HCT VFR BLD AUTO: 38.8 % (ref 35–47)
HGB BLD-MCNC: 13.1 G/DL (ref 11.7–15.7)
MCH RBC QN AUTO: 32.2 PG (ref 26.5–33)
MCHC RBC AUTO-ENTMCNC: 33.8 G/DL (ref 31.5–36.5)
MCV RBC AUTO: 95 FL (ref 78–100)
PLATELET # BLD AUTO: 177 10E9/L (ref 150–450)
RBC # BLD AUTO: 4.07 10E12/L (ref 3.8–5.2)
WBC # BLD AUTO: 9.9 10E9/L (ref 4–11)

## 2019-06-07 PROCEDURE — 36415 COLL VENOUS BLD VENIPUNCTURE: CPT | Performed by: OBSTETRICS & GYNECOLOGY

## 2019-06-07 PROCEDURE — 85027 COMPLETE CBC AUTOMATED: CPT | Performed by: OBSTETRICS & GYNECOLOGY

## 2019-06-07 PROCEDURE — 99207 ZZC POST PARTUM EXAM: CPT | Performed by: OBSTETRICS & GYNECOLOGY

## 2019-06-07 NOTE — PROGRESS NOTES
Subjective  34 year old non-pregnant female presents today for a postpartum visit.  Patient had a  on .  No pain.  No vaginal bleeding.  No problems urinating.  Normal bowel movements.  Patient is pumping.  No signs and symptoms of ppd.  Patient scored a 3 on the PHQ-9.  No thoughts of suicide or infanticide.  Patient is not due for a pap smear today.  Patient is wanting to do condoms for birth control.        ROS: 10 point ROS neg other than the symptoms noted above in the HPI.  History reviewed. No pertinent past medical history.  Past Surgical History:   Procedure Laterality Date     LAPAROSCOPIC CHOLECYSTECTOMY N/A 2019    Procedure: LAPAROSCOPIC CHOLECYSTECTOMY;  Surgeon: Davey Rinaldi MD;  Location: [facility] OR     Family History   Problem Relation Age of Onset     Myocardial Infarction Maternal Grandfather      Other - See Comments Maternal Grandfather         stroke     Other - See Comments Maternal Uncle         stroke     Social History     Tobacco Use     Smoking status: Former Smoker     Last attempt to quit: 2014     Years since quittin.4     Smokeless tobacco: Never Used   Substance Use Topics     Alcohol use: Yes     Frequency: Never         Objective  Vitals: BP 92/60   Pulse 64   Wt 68.5 kg (151 lb)   LMP 2018 (Approximate)   Breastfeeding? Yes   BMI 23.65 kg/m    BMI= Body mass index is 23.65 kg/m .         Assessment  1.)  Post partum visit  2.)  S/p  on   3.)  Birth control   4.)  Gestational thrombocytopenia     Plan  1.)  CBC  2.)  Follow-up for annual exam      Sherly De Dios

## 2019-06-07 NOTE — PATIENT INSTRUCTIONS
If you have any questions regarding your visit, Please contact your care team.    [facility] CLINIC HOURS TELEPHONE NUMBER   Sherly De Dios M.D.    Max Valdovinos -         Monday-[facility]  7:00 am - 5 pm Monday's Tuesday- [facility]  8:00am- 5 pm  Wednesday- Off  Thursday- Offf Friday-Am [place], and [facility]  [place] 8:00-11:30 AM  [place] 1:00-5:00 PM [facility]  [address] [place] [address]  [phone] ask for [facility]    Imaging [phone]    [facility]  [address] [place], [place] [address] [phone]  Imaging [phone]    [facility]  [address] [place], [place] [id number] [phone]  Imaging Scheduling - [phone]     Urgent Care locations:    [facility] Saturday and Sunday   9 am - 5 pm    Monday-Friday   12 pm - 8 pm  Saturday and Sunday   9 am - 5 pm   [phone]       If you need a medication refill, please contact your pharmacy. Please allow 3 business days for your refill to be completed.  As always, Thank you for trusting us with your healthcare needs!

## 2019-07-08 ENCOUNTER — OFFICE VISIT (OUTPATIENT)
Dept: PODIATRY | Facility: CLINIC | Age: 34
End: 2019-07-08
Payer: COMMERCIAL

## 2019-07-08 DIAGNOSIS — B35.3 TINEA PEDIS OF BOTH FEET: ICD-10-CM

## 2019-07-08 DIAGNOSIS — B35.1 ONYCHOMYCOSIS: Primary | ICD-10-CM

## 2019-07-08 DIAGNOSIS — L60.0 INGROWING NAIL: ICD-10-CM

## 2019-07-08 PROCEDURE — 99203 OFFICE O/P NEW LOW 30 MIN: CPT | Performed by: PODIATRIST

## 2019-07-08 RX ORDER — TERBINAFINE HYDROCHLORIDE 250 MG/1
250 TABLET ORAL DAILY
Qty: 30 TABLET | Refills: 2 | Status: SHIPPED | OUTPATIENT
Start: 2019-07-08 | End: 2020-12-25

## 2019-07-08 ASSESSMENT — MIFFLIN-ST. JEOR: SCORE: 1435.24

## 2019-07-08 NOTE — PATIENT INSTRUCTIONS
Foot fungus (tinea pedis) and toenail fungus (onychomycosis)    The type of fungus that usually infects feet and toenails can be found most everywhere in our environment.  We can not eliminate exposure to it.  Some people are more susceptible to this fungus for many reasons.  Scaring or repetitive injury to a toenail can also cause your nail to be thick, yellow or crumbly and also make it more susceptible to fungus infections.      Over the counter topical cream can be most helpful for athletes foot/tinea pedis.  Occasionally prescription creams or tablets my be needed in persistent or recurrent infections.  Topical treatments for toenail fungus is only about 10% effective.  It is more effective if used very early or if there is minimal toenail involvement.  Many topicals or home remedies are solvents and cause the nail to get soft or even fall off.  This is not really treating the fungus but making it more easy to trim or sand thin to improve the appearance.    Oral Lamisil or terbinafine is about 80% effective for 5 years.  Recurrence rates are high after 5 years and this can be treated again if desired.  Other treatment options such as lasers are more expensive and time consuming as they usually require multiple treatments.  The literature demonstrates lasers are not more effective in treating toenail fungus.      The most common way to use Lamisil tablets is once daily for 90 days.  It will take up to 6 months for the small toenails and up to 12 months for the big toenail to see the full effect of the medication.  The first changes are often seen about the time you finish the medication.     Relatively few complications occur with this medication but some complications can be severe.  A blood test should be ordered or monitored as directed by your doctor.  Do not use this medication if you have preexisting liver disease or elevated liver enzymes.  Do not drink alcohol while using this medication.    It is also  helpful to sand the nail length and thickness to reduce the bulk or thickness of the nail.  This is done with  grit sandpaper on a sanding block or course file, even a metal file.  Do not use power tools as they can be too aggressive and cause further injury.  This is best to do very frequently such as once a week during or after bathing rather than all at once.      The affected nail can also be removed permanently if desired.  This can be done comfortably in the clinic and is often preferred when this problems recurs, does not respond to treatment, is painful, or the patient does not want to keep managing it for the rest of their life.      Nathan Heller DPM          INGROWN TOENAIL POSTOPERATIVE INSTRUCTIONS   (Nail avulsion or chemical matrixectomy)   1.  Go directly home and elevate the affected foot on one or two pillows for the remainder of the day & evening. Your toe may stay numb for 2-8 hours.   2.  Take Tylenol, ibuprofen or another anti-inflammatory as needed for pain. Most people require no pain medication.  3.  Use oral antibiotic if that was prescribed at your doctor visit. Take the entire prescription even if your symptoms have improved.   4.  Keep dressing dry and intact the day of the procedure. The morning after the procedure, remove entire dressing and soak or wash the affected area in lukewarm water for 5-10 minutes.  You may add Epsom salt to soothe the area and help it become drier. Do this twice a day or more until the surgical site remains dry without drainage.  This may take 1-2 weeks if a small part of your nail was removed or 4-8 weeks if the entire nail was removed. You may count showering or bathing as one soak.  After each soak, pat the area dry with a clean towel or gauze and then allow to air dry for a few minutes. Then cover with a cloth or fabric bandaid.  Avoid ointments as they keep the area to wet. Encourage this wound to become dry with a scab.   5.  You may walk and  pursue everyday activities as tolerated with either an open toe shoe or cut-out shoe as needed. You may wear regular roomy shoes if no pain is noted.  No swimming in the lake, river, pool or hot tub until the wound has become dry.   7.  Watch for any signs or symptoms of infection such as: red streaks going up the foot/leg, swelling, pus or foul odor. For patients that have had a permanent phenol procedure, the toe will drain longer and may look red or sore for a half an inch around the wound similar to infection because it is a chemical burn.  Please call with questions.  8.  You may call my office in 1-2 weeks if the surgical site is not becoming drier or if other complications occur.   9.  There is 5-10% chance of complications such as infection or formation of another nail or a thick scared nail.

## 2019-07-08 NOTE — LETTER
7/8/2019         RE: Ethan Watt  [address] [facility] [address]        Dear Colleague,    Thank you for referring your patient, Ethan Watt, to the [facility]. Please see a copy of my visit note below.    HPI:  Ethan Watt is a 34 year old female who is seen in consultation at the request of self.    No ref. provider found     Pt presents for eval of:   (Onset, Location, L/R, Character, Treatments, Injury if yes)     Check nails     Works as a Not working.    Weight management plan: Patient was referred to their PCP to discuss a diet and exercise plan.     PATIENT HISTORY:  Ethan Watt is a 34 year old female who presents to clinic complaining of recent changes about their toenails including thickening, discoloration, and crumbling.  This started about 1 year ago..       Review of Systems:  Patient denies fever, chills, rash, wound, stiffness, limping, numbness, weakness, heart burn, blood in stool, chest pain with activity, calf pain when walking, shortness of breath with activity, chronic cough, easy bleeding/bruising, swelling of ankles, excessive thirst, fatigue, depression, anxiety.       PAST MEDICAL HISTORY: No past medical history on file.     PAST SURGICAL HISTORY:   Past Surgical History:   Procedure Laterality Date     LAPAROSCOPIC CHOLECYSTECTOMY N/A 1/16/2019    Procedure: LAPAROSCOPIC CHOLECYSTECTOMY;  Surgeon: Davey Rinaldi MD;  Location:  OR        MEDICATIONS:   Current Outpatient Medications:      terbinafine (LAMISIL) 250 MG tablet, Take 1 tablet (250 mg) by mouth daily, Disp: 30 tablet, Rfl: 2     Prenatal Vit-Fe Fumarate-FA (PRENATAL MULTIVITAMIN W/IRON) 27-0.8 MG tablet, Take 1 tablet by mouth daily, Disp: , Rfl:      UNABLE TO FIND, MEDICATION NAME: Comfort Tone, Disp: , Rfl:      ALLERGIES:  No Known Allergies     SOCIAL HISTORY:   Social History     Socioeconomic History     Marital status:      Spouse name: Not on file     Number  "of children: Not on file     Years of education: Not on file     Highest education level: Not on file   Occupational History     Not on file   Social Needs     Financial resource strain: Not on file     Food insecurity:     Worry: Not on file     Inability: Not on file     Transportation needs:     Medical: Not on file     Non-medical: Not on file   Tobacco Use     Smoking status: Former Smoker     Last attempt to quit: 2014     Years since quittin.5     Smokeless tobacco: Never Used   Substance and Sexual Activity     Alcohol use: Yes     Frequency: Never     Drug use: No     Sexual activity: Yes     Partners: Male     Birth control/protection: None     Comment: pregnant   Lifestyle     Physical activity:     Days per week: Not on file     Minutes per session: Not on file     Stress: Not on file   Relationships     Social connections:     Talks on phone: Not on file     Gets together: Not on file     Attends Holiness service: Not on file     Active member of club or organization: Not on file     Attends meetings of clubs or organizations: Not on file     Relationship status: Not on file     Intimate partner violence:     Fear of current or ex partner: Not on file     Emotionally abused: Not on file     Physically abused: Not on file     Forced sexual activity: Not on file   Other Topics Concern     Parent/sibling w/ CABG, MI or angioplasty before 65F 55M? Not Asked   Social History Narrative     Not on file        FAMILY HISTORY:   Family History   Problem Relation Age of Onset     Myocardial Infarction Maternal Grandfather      Other - See Comments Maternal Grandfather         stroke     Other - See Comments Maternal Uncle         stroke        EXAM:Vitals: BP (P) 102/56   Ht (P) 1.702 m (5' 7\")   Wt (P) 70.8 kg (156 lb)   BMI (P) 24.43 kg/m     BMI= Body mass index is 24.43 kg/m  (pended).    General appearance: Patient is alert and fully cooperative with history & exam.  No sign of distress is " noted during the visit.     Psychiatric: Affect is pleasant & appropriate.  Patient appears motivated to improve health.     Respiratory: Breathing is regular & unlabored while sitting.     HEENT: Hearing is intact to spoken word.  Speech is clear.  No gross evidence of visual impairment that would impact ambulation.     Vascular: DP & PT pulses are intact & regular bilaterally.  No significant edema or varicosities noted.  CFT and skin temperature is normal to both lower extremities.     Neurologic: Lower extremity sensation is intact to light touch.  No evidence of weakness or contracture in the lower extremities.  No evidence of neuropathy.    Dermatologic: All of the toenails are 50% or more involved with yellow discoloration thickening and lysing distally.  Dry excoriated epidermis with macerated skin interdigitally.  No open fissures or ulcers.  Several toenails are thickened, discolored, lysing, with subungual debris.  No acute petechia.    Musculoskeletal: Patient is ambulatory without assistive device or brace.  No gross ankle deformity noted.  No foot or ankle joint effusion is noted.       ASSESSMENT:       ICD-10-CM    1. Onychomycosis B35.1 terbinafine (LAMISIL) 250 MG tablet   2. Ingrowing nail L60.0    3. Tinea pedis of both feet B35.3         PLAN:  Reviewed patient's chart in epic. Discussed treatment options.  Efficacy of topical medication may average around 8% or slightly more.  Discussed oral treatments. Terbinafine demonstrates approximately 80% efficacy for 5 years with slightly greater coverage spectrum than iconazole.  We discussed risk factors with this treatment and alternative options.  I reviewed most recent hepatic function panel which is normal.      She would like to breast-feed for approximately 1 month more and then would be willing to discontinue breast-feeding to utilize this medication.  She was instructed not to utilize a medication until she discontinues  breast-feeding.    Dispensed RX to complete a 90 day course of oral Lamisil 250 mg once daily.  Follow up in 3 months to monitor therapy or consider adding another pulse if needed.  All questions were answered to their satisfaction.  Alternative options were discussed.  She may utilize the topical Lamisil to resolve the tinea pedis.  However this is not likely to change her toenails much.       Nathan Heller DPM        Again, thank you for allowing me to participate in the care of your patient.        Sincerely,        Nathan Heller DPM

## 2019-07-08 NOTE — PROGRESS NOTES
HPI:  Ethan Watt is a 34 year old female who is seen in consultation at the request of self.    No ref. provider found     Pt presents for eval of:   (Onset, Location, L/R, Character, Treatments, Injury if yes)     Check nails     Works as a Not working.    Weight management plan: Patient was referred to their PCP to discuss a diet and exercise plan.     PATIENT HISTORY:  Ethan Watt is a 34 year old female who presents to clinic complaining of recent changes about their toenails including thickening, discoloration, and crumbling.  This started about 1 year ago..       Review of Systems:  Patient denies fever, chills, rash, wound, stiffness, limping, numbness, weakness, heart burn, blood in stool, chest pain with activity, calf pain when walking, shortness of breath with activity, chronic cough, easy bleeding/bruising, swelling of ankles, excessive thirst, fatigue, depression, anxiety.       PAST MEDICAL HISTORY: No past medical history on file.     PAST SURGICAL HISTORY:   Past Surgical History:   Procedure Laterality Date     LAPAROSCOPIC CHOLECYSTECTOMY N/A 1/16/2019    Procedure: LAPAROSCOPIC CHOLECYSTECTOMY;  Surgeon: Davey Rinaldi MD;  Location:  OR        MEDICATIONS:   Current Outpatient Medications:      terbinafine (LAMISIL) 250 MG tablet, Take 1 tablet (250 mg) by mouth daily, Disp: 30 tablet, Rfl: 2     Prenatal Vit-Fe Fumarate-FA (PRENATAL MULTIVITAMIN W/IRON) 27-0.8 MG tablet, Take 1 tablet by mouth daily, Disp: , Rfl:      UNABLE TO FIND, MEDICATION NAME: Comfort Tone, Disp: , Rfl:      ALLERGIES:  No Known Allergies     SOCIAL HISTORY:   Social History     Socioeconomic History     Marital status:      Spouse name: Not on file     Number of children: Not on file     Years of education: Not on file     Highest education level: Not on file   Occupational History     Not on file   Social Needs     Financial resource strain: Not on file     Food insecurity:     Worry: Not on file     " Inability: Not on file     Transportation needs:     Medical: Not on file     Non-medical: Not on file   Tobacco Use     Smoking status: Former Smoker     Last attempt to quit: 2014     Years since quittin.5     Smokeless tobacco: Never Used   Substance and Sexual Activity     Alcohol use: Yes     Frequency: Never     Drug use: No     Sexual activity: Yes     Partners: Male     Birth control/protection: None     Comment: pregnant   Lifestyle     Physical activity:     Days per week: Not on file     Minutes per session: Not on file     Stress: Not on file   Relationships     Social connections:     Talks on phone: Not on file     Gets together: Not on file     Attends Evangelical service: Not on file     Active member of club or organization: Not on file     Attends meetings of clubs or organizations: Not on file     Relationship status: Not on file     Intimate partner violence:     Fear of current or ex partner: Not on file     Emotionally abused: Not on file     Physically abused: Not on file     Forced sexual activity: Not on file   Other Topics Concern     Parent/sibling w/ CABG, MI or angioplasty before 65F 55M? Not Asked   Social History Narrative     Not on file        FAMILY HISTORY:   Family History   Problem Relation Age of Onset     Myocardial Infarction Maternal Grandfather      Other - See Comments Maternal Grandfather         stroke     Other - See Comments Maternal Uncle         stroke        EXAM:Vitals: BP (P) 102/56   Ht (P) 1.702 m (5' 7\")   Wt (P) 70.8 kg (156 lb)   BMI (P) 24.43 kg/m    BMI= Body mass index is 24.43 kg/m  (pended).    General appearance: Patient is alert and fully cooperative with history & exam.  No sign of distress is noted during the visit.     Psychiatric: Affect is pleasant & appropriate.  Patient appears motivated to improve health.     Respiratory: Breathing is regular & unlabored while sitting.     HEENT: Hearing is intact to spoken word.  Speech is clear.  " No gross evidence of visual impairment that would impact ambulation.     Vascular: DP & PT pulses are intact & regular bilaterally.  No significant edema or varicosities noted.  CFT and skin temperature is normal to both lower extremities.     Neurologic: Lower extremity sensation is intact to light touch.  No evidence of weakness or contracture in the lower extremities.  No evidence of neuropathy.    Dermatologic: All of the toenails are 50% or more involved with yellow discoloration thickening and lysing distally.  Dry excoriated epidermis with macerated skin interdigitally.  No open fissures or ulcers.  Several toenails are thickened, discolored, lysing, with subungual debris.  No acute petechia.    Musculoskeletal: Patient is ambulatory without assistive device or brace.  No gross ankle deformity noted.  No foot or ankle joint effusion is noted.       ASSESSMENT:       ICD-10-CM    1. Onychomycosis B35.1 terbinafine (LAMISIL) 250 MG tablet   2. Ingrowing nail L60.0    3. Tinea pedis of both feet B35.3         PLAN:  Reviewed patient's chart in epic. Discussed treatment options.  Efficacy of topical medication may average around 8% or slightly more.  Discussed oral treatments. Terbinafine demonstrates approximately 80% efficacy for 5 years with slightly greater coverage spectrum than iconazole.  We discussed risk factors with this treatment and alternative options.  I reviewed most recent hepatic function panel which is normal.      She would like to breast-feed for approximately 1 month more and then would be willing to discontinue breast-feeding to utilize this medication.  She was instructed not to utilize a medication until she discontinues breast-feeding.    Dispensed RX to complete a 90 day course of oral Lamisil 250 mg once daily.  Follow up in 3 months to monitor therapy or consider adding another pulse if needed.  All questions were answered to their satisfaction.  Alternative options were discussed.   She may utilize the topical Lamisil to resolve the tinea pedis.  However this is not likely to change her toenails much.       Nathan Heller DPM

## 2019-10-02 ENCOUNTER — NURSE TRIAGE (OUTPATIENT)
Dept: NURSING | Facility: CLINIC | Age: 34
End: 2019-10-02

## 2020-02-13 ENCOUNTER — TELEPHONE (OUTPATIENT)
Dept: PODIATRY | Facility: CLINIC | Age: 35
End: 2020-02-13

## 2020-02-13 NOTE — TELEPHONE ENCOUNTER
Patient last had a CMP 3/11/2019:   Bilirubin Total 0.1  Low   0.2 - 1.3 mg/dL Final 03/11/2019 10:12 AM MG   Albumin 3.0  Low   3.4 - 5.0 g/dL Final 03/11/2019 10:12 AM MG   Protein Total 7.2   6.8 - 8.8 g/dL Final 03/11/2019 10:12 AM MG   Alkaline Phosphatase 122   40 - 150 U/L Final 03/11/2019 10:12 AM MG   ALT 26   0 - 50 U/L Final 03/11/2019 10:12 AM MG   AST 23   0 - 45 U/L Final 03/11/2019 10:12 AM MG         Brie NOGUERA, Lead RN, BSN. . .  2/13/2020, 3:09 PM

## 2020-02-13 NOTE — TELEPHONE ENCOUNTER
Reason for Call:  Other call back    Detailed comments: Please call, Dr Heller had given Ethan a rx for Terbinafine to start once she was done breastfeeding and she has now picked it up wanted to start it.  It states that she should have lab work to check her liver prior to starting the medication and during the course of the medication and she is wondering if you wanted her to do this?    Phone Number Patient can be reached at: Home number on file [phone] (home)    Best Time: any    Can we leave a detailed message on this number? YES    Call taken on 2/13/2020 at 2:59 PM by Eveline Vick

## 2020-02-17 NOTE — TELEPHONE ENCOUNTER
DREW LESTER for patient with Dr. Heller's note below. Patient to call back if she would like another set of labs done before starting Terbinafine. Leonor Quigley CMA, February 17, 2020.

## 2020-03-11 ENCOUNTER — HEALTH MAINTENANCE LETTER (OUTPATIENT)
Age: 35
End: 2020-03-11

## 2020-12-14 ENCOUNTER — PRENATAL OFFICE VISIT (OUTPATIENT)
Dept: FAMILY MEDICINE | Facility: CLINIC | Age: 35
End: 2020-12-14
Payer: COMMERCIAL

## 2020-12-14 DIAGNOSIS — Z53.9 ERRONEOUS ENCOUNTER--DISREGARD: Primary | ICD-10-CM

## 2020-12-14 NOTE — PROGRESS NOTES
Ob Intake phone visit with verbal patient permission.  Ethan intends to see Dr. De Dios and deliver in [place].  Appointment for 1st OB visit scheduled with Padmini Bauman

## 2020-12-25 ENCOUNTER — APPOINTMENT (OUTPATIENT)
Dept: ULTRASOUND IMAGING | Facility: CLINIC | Age: 35
End: 2020-12-25
Attending: FAMILY MEDICINE
Payer: COMMERCIAL

## 2020-12-25 ENCOUNTER — TELEPHONE (OUTPATIENT)
Dept: EMERGENCY MEDICINE | Facility: CLINIC | Age: 35
End: 2020-12-25

## 2020-12-25 ENCOUNTER — NURSE TRIAGE (OUTPATIENT)
Dept: NURSING | Facility: CLINIC | Age: 35
End: 2020-12-25

## 2020-12-25 ENCOUNTER — HOSPITAL ENCOUNTER (EMERGENCY)
Facility: CLINIC | Age: 35
Discharge: HOME | End: 2020-12-25
Attending: FAMILY MEDICINE | Admitting: FAMILY MEDICINE
Payer: COMMERCIAL

## 2020-12-25 VITALS
SYSTOLIC BLOOD PRESSURE: 108 MMHG | RESPIRATION RATE: 18 BRPM | WEIGHT: 160 LBS | HEART RATE: 62 BPM | BODY MASS INDEX: 25.06 KG/M2 | DIASTOLIC BLOOD PRESSURE: 78 MMHG | OXYGEN SATURATION: 100 % | TEMPERATURE: 98.2 F

## 2020-12-25 DIAGNOSIS — Z33.1 PREGNANT STATE, INCIDENTAL: ICD-10-CM

## 2020-12-25 DIAGNOSIS — N93.9 VAGINAL BLEEDING: ICD-10-CM

## 2020-12-25 LAB
ALBUMIN UR-MCNC: NEGATIVE MG/DL
APPEARANCE UR: CLEAR
B-HCG SERPL-ACNC: 417 IU/L (ref 0–5)
BASOPHILS # BLD AUTO: 0.1 10E9/L (ref 0–0.2)
BASOPHILS NFR BLD AUTO: 0.5 %
BILIRUB UR QL STRIP: NEGATIVE
BLOOD BANK CMNT PATIENT-IMP: NORMAL
COLOR UR AUTO: YELLOW
DIFFERENTIAL METHOD BLD: NORMAL
EOSINOPHIL NFR BLD AUTO: 1.1 %
ERYTHROCYTE [DISTWIDTH] IN BLOOD BY AUTOMATED COUNT: 12 % (ref 10–15)
GLUCOSE UR STRIP-MCNC: NEGATIVE MG/DL
HCT VFR BLD AUTO: 40.6 % (ref 35–47)
HGB BLD-MCNC: 13.1 G/DL (ref 11.7–15.7)
HGB UR QL STRIP: ABNORMAL
IMM GRANULOCYTES # BLD: 0 10E9/L (ref 0–0.4)
IMM GRANULOCYTES NFR BLD: 0.3 %
KETONES UR STRIP-MCNC: NEGATIVE MG/DL
LEUKOCYTE ESTERASE UR QL STRIP: NEGATIVE
LYMPHOCYTES # BLD AUTO: 1.9 10E9/L (ref 0.8–5.3)
LYMPHOCYTES NFR BLD AUTO: 20.5 %
MCH RBC QN AUTO: 31.6 PG (ref 26.5–33)
MCHC RBC AUTO-ENTMCNC: 32.3 G/DL (ref 31.5–36.5)
MCV RBC AUTO: 98 FL (ref 78–100)
MONOCYTES # BLD AUTO: 0.7 10E9/L (ref 0–1.3)
MONOCYTES NFR BLD AUTO: 7.1 %
MUCOUS THREADS #/AREA URNS LPF: PRESENT /LPF
NEUTROPHILS # BLD AUTO: 6.5 10E9/L (ref 1.6–8.3)
NEUTROPHILS NFR BLD AUTO: 70.5 %
NITRATE UR QL: NEGATIVE
NRBC # BLD AUTO: 0 10*3/UL
NRBC BLD AUTO-RTO: 0 /100
PH UR STRIP: 5 PH (ref 5–7)
PLATELET # BLD AUTO: 205 10E9/L (ref 150–450)
RBC # BLD AUTO: 4.15 10E12/L (ref 3.8–5.2)
RBC #/AREA URNS AUTO: 21 /HPF (ref 0–2)
SOURCE: ABNORMAL
SP GR UR STRIP: 1.02 (ref 1–1.03)
SQUAMOUS #/AREA URNS AUTO: 1 /HPF (ref 0–1)
UROBILINOGEN UR STRIP-MCNC: 0 MG/DL (ref 0–2)
WBC # BLD AUTO: 9.2 10E9/L (ref 4–11)
WBC #/AREA URNS AUTO: 8 /HPF (ref 0–5)

## 2020-12-25 PROCEDURE — 99284 EMERGENCY DEPT VISIT MOD MDM: CPT | Performed by: FAMILY MEDICINE

## 2020-12-25 PROCEDURE — 87086 URINE CULTURE/COLONY COUNT: CPT | Performed by: FAMILY MEDICINE

## 2020-12-25 PROCEDURE — 76817 TRANSVAGINAL US OBSTETRIC: CPT

## 2020-12-25 PROCEDURE — 84702 CHORIONIC GONADOTROPIN TEST: CPT | Performed by: FAMILY MEDICINE

## 2020-12-25 PROCEDURE — 81001 URINALYSIS AUTO W/SCOPE: CPT | Performed by: FAMILY MEDICINE

## 2020-12-25 PROCEDURE — 85025 COMPLETE CBC W/AUTO DIFF WBC: CPT | Performed by: FAMILY MEDICINE

## 2020-12-25 NOTE — DISCHARGE INSTRUCTIONS
Your quantitative pregnancy level is 417.  You have a normal platelet count.  We would expect that this level should double within the next 48-72 hours.  Please follow-up with your primary care provider on Monday to have this level repeated.  Your ultrasound reveals a small gestational sac which could represent early pregnancy.  We did not administer a RhoGam shot today.  Return to the emergency department if you develop increased bleeding or pain.

## 2020-12-25 NOTE — ED PROVIDER NOTES
[facility] ED Provider Note   Patient: Ethan Watt  MRN #:  [id number]  Date of Visit: 2020    CC:     Chief Complaint   Patient presents with     Vaginal Bleeding     HPI:  Ethan Watt is a 35 year old female G2, P1 at approximately 6 weeks gestation who presented to the emergency department with acute onset of vaginal bleeding/spotting this morning.  Patient reports that she was experiencing some mild pelvic cramping last night.  She had about a quarter size clot and another dime size clot along with some bloody mucus.  She had a history of subchorionic bleed with her first pregnancy a year ago.  She is Rh negative and her first pregnancy was complicated by pregnancy related thrombocytopenia, and acute cholecystitis.  Patient states that she had intercourse yesterday.  She denies fever, chills, Covid 19 exposure or symptoms.  She does not have any urinary symptoms.    Problem List:  Patient Active Problem List    Diagnosis Date Noted     Thrombocytopenia (H) 2019     Priority: Medium     Repeat Platelets weekly.  Clinically impression still is gestational thrombocytopenia. No further testing/special management as long as platelets remain above 70k. Per Hematology       Calculus of gallbladder 2018     Priority: Medium     H/O LEEP 2018     Priority: Medium       No past medical history on file.    MEDS:      Prenatal Vit-Fe Fumarate-FA (PRENATAL MULTIVITAMIN W/IRON) 27-0.8 MG tablet       UNABLE TO FIND        ALLERGIES:  No Known Allergies    Past Surgical History:   Procedure Laterality Date     LAPAROSCOPIC CHOLECYSTECTOMY N/A 2019    Procedure: LAPAROSCOPIC CHOLECYSTECTOMY;  Surgeon: Davey Rinaldi MD;  Location:  OR       Social History     Tobacco Use     Smoking status: Former Smoker     Quit date: 2014     Years since quittin.0     Smokeless tobacco: Never Used   Substance Use  Topics     Alcohol use: Yes     Frequency: Never     Drug use: No         Review of Systems   Except as noted in HPI, all other systems were reviewed and are negative    Physical Exam     Vitals were reviewed  Patient Vitals for the past 8 hrs:   BP Temp Temp src Pulse Resp SpO2 Weight   12/25/20 0822 112/54 98.2  F (36.8  C) Oral 69 18 99 % 72.6 kg (160 lb)     GENERAL APPEARANCE: Alert and oriented x3; no apparent distress  FACE: normal facies  EYES: Pupils are equal  HENT: normal external exam  NECK: no adenopathy or asymmetry  RESP: normal respiratory effort; clear breath sounds bilaterally  CV: regular rate and rhythm; no significant murmurs, gallops or rubs  ABD: soft, no tenderness; no rebound or guarding; bowel sounds are normal  EXT: No calf tenderness or pitting edema  SKIN: no worrisome rash  NEURO: no facial droop; no focal deficits, speech is normal        Available Lab/Imaging Results     Results for orders placed or performed during the hospital encounter of 12/25/20 (from the past 24 hour(s))   Rho (D) immune globulin (RhoGam) Lab Study   Result Value Ref Range    Rhogam Order Order received    CBC with platelets differential   Result Value Ref Range    WBC 9.2 4.0 - 11.0 10e9/L    RBC Count 4.15 3.8 - 5.2 10e12/L    Hemoglobin 13.1 11.7 - 15.7 g/dL    Hematocrit 40.6 35.0 - 47.0 %    MCV 98 78 - 100 fl    MCH 31.6 26.5 - 33.0 pg    MCHC 32.3 31.5 - 36.5 g/dL    RDW 12.0 10.0 - 15.0 %    Platelet Count 205 150 - 450 10e9/L    Diff Method Automated Method     % Neutrophils 70.5 %    % Lymphocytes 20.5 %    % Monocytes 7.1 %    % Eosinophils 1.1 %    % Basophils 0.5 %    % Immature Granulocytes 0.3 %    Nucleated RBCs 0 0 /100    Absolute Neutrophil 6.5 1.6 - 8.3 10e9/L    Absolute Lymphocytes 1.9 0.8 - 5.3 10e9/L    Absolute Monocytes 0.7 0.0 - 1.3 10e9/L    Absolute Basophils 0.1 0.0 - 0.2 10e9/L    Abs Immature Granulocytes 0.0 0 - 0.4 10e9/L    Absolute Nucleated RBC 0.0    UA reflex to Microscopic    Result Value Ref Range    Color Urine Yellow     Appearance Urine Clear     Glucose Urine Negative NEG^Negative mg/dL    Bilirubin Urine Negative NEG^Negative    Ketones Urine Negative NEG^Negative mg/dL    Specific Gravity Urine 1.020 1.003 - 1.035    Blood Urine Large (A) NEG^Negative    pH Urine 5.0 5.0 - 7.0 pH    Protein Albumin Urine Negative NEG^Negative mg/dL    Urobilinogen mg/dL 0.0 0.0 - 2.0 mg/dL    Nitrite Urine Negative NEG^Negative    Leukocyte Esterase Urine Negative NEG^Negative    Source Midstream Urine     RBC Urine 21 (H) 0 - 2 /HPF    WBC Urine 8 (H) 0 - 5 /HPF    Squamous Epithelial /HPF Urine 1 0 - 1 /HPF    Mucous Urine Present (A) NEG^Negative /LPF   HCG quantitative pregnancy (blood)   Result Value Ref Range    HCG Quantitative Serum 417 (H) 0 - 5 IU/L   US OB <14 Weeks w Transvaginal Single    Narrative    US OB <14 WEEKS WITH TRANSVAGINAL SINGLE   12/25/2020 9:25 AM    HISTORY: Vaginal spotting. 6 weeks pregnant.    FINDINGS: There is a small gestational sac within the endometrial  cavity. The mean sac diameter measures 4 mm which corresponds to 4  weeks and 6 days gestation. No yolk sac or fetal pole visualized,  which could be due to early gestation.    The right ovary measures 1.1 x 2.1 x 1.3 cm. The left ovary measures  3.5 x 2.0 x 1.6 cm. There is normal blood flow to the ovaries. No  adnexal masses. There is no free fluid in the pelvis. No subchorionic  hemorrhage.      Impression    IMPRESSION: There is small gestational sac within the endometrial  cavity. No yolk sac or fetal pole visualized within the gestational  sac, which could be due to early gestation. Recommend correlation with  serial beta-hCG.    STEVEN ROBLERO MD            Impression     Final diagnoses:   Vaginal bleeding   Pregnant state, incidental         ED Course & Medical Decision Making   Ethan Watt is a 35 year old female who presented to the emergency department with first trimester vaginal  bleeding/spotting.  Symptoms started this morning with a scant amount of blood and blood clots about the size of a quarter and a dime.  She had intercourse during the day yesterday.  She had very minimal cramping in the middle of the night but none this morning.  Patient was seen shortly after arrival.  Temperature is 98.2, blood pressure 112/54.  Exam reveals no tenderness over the suprapubic area.  Laboratory work-up reveals a white blood count of 9.2, hemoglobin of 13.1 and platelet count of 205.  She had a previous history of thrombocytopenia but this does not appear to be the case.  Urinalysis reveals 21 red blood cells and 8 white blood cells.  A formal urine culture is pending.  Patient currently does not have any symptoms of UTI.  Quantitative pregnancy level is 417.  OB ultrasound reveals a small gestational sac within the endometrial canal but there is no yolk sac or fetal pole.  This could be due to early gestation versus blighted ovum.  We discussed these possibilities.  Patient will monitor for further symptoms and follow-up with her primary care provider on Monday for a serial quantitative hCG level.  We will hold off on RhoGam for now, and defer this to her primary care or OB provider on Monday.  Return to the ED at any time if bleeding increases or she develops more pain.      Written after-visit summary and instructions were given at the time of discharge.    Follow up Plan:   Michael Salazar MD  [address] [place] [address]  [phone]    In 3 days        Discharge Instructions:   Your quantitative pregnancy level is 417.  You have a normal platelet count.  We would expect that this level should double within the next 48-72 hours.  Please follow-up with your primary care provider on Monday to have this level repeated.  Your ultrasound reveals a small gestational sac which could represent early pregnancy.  We did not administer a RhoGam shot today.  Return to the emergency  department if you develop increased bleeding or pain.       Disclaimer: This note consists of words and symbols derived from keyboarding and dictation using voice recognition software.  As a result, there may be errors that have gone undetected.  Please consider this when interpreting information found in this note.       Elsa Marks MD  12/25/20 5599

## 2020-12-25 NOTE — ED AVS SNAPSHOT
[facility] Emergency Dept  [address] [place] [address]  Phone: [phone]  Fax: [phone]                                    Ethan Watt   MRN: [id number]    Department: [facility] Emergency Dept   Date of Visit: 12/25/2020           After Visit Summary Signature Page    I have received my discharge instructions, and my questions have been answered. I have discussed any challenges I see with this plan with the nurse or doctor.    ..........................................................................................................................................  Patient/Patient Representative Signature      ..........................................................................................................................................  Patient Representative Print Name and Relationship to Patient    ..................................................               ................................................  Date                                   Time    ..........................................................................................................................................  Reviewed by Signature/Title    ...................................................              ..............................................  Date                                               Time          22EPIC Rev 08/18

## 2020-12-25 NOTE — TELEPHONE ENCOUNTER
FNA triage call : No Exposure , travel or testing or Sxs of Covid .   Presenting problem : 6 weeks pregnant, LISSA 8/15/21 Pt called has not seen  OB provider   Yet .    Mild cramping and small  amount of blood started 12/24/20 . Currently : up to quarter size  clot x1 and some dime size but not wearing a pad yet - not much vaginal bleeding and mild cramping persisting.  Currently : 1&0 , eating and activity are normal , passed some whitish gray tissue and advised to bring it to ED.   Guideline used : Pregnancy - vaginal bleeding <20 weeksAAH   Disposition and recommendations : COVID 19 Nurse Triage Plan/Patient Instructions    Please be aware that novel coronavirus (COVID-19) may be circulating in the community. If you develop symptoms such as fever, cough, or SOB or if you have concerns about the presence of another infection including coronavirus (COVID-19), please contact your health care provider or visit [email or web address].     Disposition/Instructions    ED Visit recommended. Follow protocol based instructions. Pt agrees to go to [facility] ED now and advised to have someone drive her .    Bring Your Own Device:  Please also bring your smart device(s) (smart phones, tablets, laptops) and their charging cables for your personal use and to communicate with your care team during your visit.    Thank you for taking steps to prevent the spread of this virus.  o Limit your contact with others.  o Wear a simple mask to cover your cough.  o Wash your hands well and often.    Caller verbalizes understanding and denies further questions and will call back if further symptoms to triage or questions  . Jelena Medeiros RN  - [place] Nurse Advisor     Reason for Disposition    Passed tissue (e.g., gray-white)    Additional Information    Negative: Shock suspected (e.g., cold/pale/clammy skin, too weak to stand, low BP, rapid pulse)    Negative: Difficult to awaken or acting confused (e.g., disoriented, slurred speech)     Negative: Passed out (i.e., lost consciousness, collapsed and was not responding)    Negative: Sounds like a life-threatening emergency to the triager    Negative: [1] Vaginal bleeding AND [2] pregnant > 20 weeks    Negative: Not pregnant or pregnancy status unknown    Negative: SEVERE abdominal pain    Negative: [1] SEVERE vaginal bleeding (i.e., soaking 2 pads / hour, large blood clots) AND [2] present 2 or more hours    Negative: SEVERE dizziness (e.g., unable to stand, requires support to walk, feels like passing out)    Negative: [1] MODERATE vaginal bleeding (i.e., soaking 1 pad / hour; clots) AND [2] present > 6 hours    Negative: [1] MODERATE vaginal bleeding (i.e., soaking 1 pad / hour; clots) AND [2] pregnant > 12 weeks    Protocols used: PREGNANCY - VAGINAL BLEEDING LESS THAN 20 WEEKS [facility]

## 2020-12-25 NOTE — ED TRIAGE NOTES
She started having pink tinged discharge last night and was passing quarter size clots this morning. The bleeding has stopped now.

## 2020-12-26 ENCOUNTER — TELEPHONE (OUTPATIENT)
Dept: EMERGENCY MEDICINE | Facility: CLINIC | Age: 35
End: 2020-12-26

## 2020-12-26 ENCOUNTER — HOSPITAL ENCOUNTER (EMERGENCY)
Facility: CLINIC | Age: 35
Discharge: HOME | End: 2020-12-26
Attending: EMERGENCY MEDICINE | Admitting: EMERGENCY MEDICINE
Payer: COMMERCIAL

## 2020-12-26 VITALS
SYSTOLIC BLOOD PRESSURE: 103 MMHG | BODY MASS INDEX: 24.95 KG/M2 | DIASTOLIC BLOOD PRESSURE: 70 MMHG | HEART RATE: 64 BPM | OXYGEN SATURATION: 100 % | RESPIRATION RATE: 18 BRPM | TEMPERATURE: 98.1 F | WEIGHT: 159.3 LBS

## 2020-12-26 DIAGNOSIS — O26.891 RH NEGATIVE STATE IN ANTEPARTUM PERIOD, FIRST TRIMESTER: ICD-10-CM

## 2020-12-26 DIAGNOSIS — O20.9 VAGINAL BLEEDING IN PREGNANCY, FIRST TRIMESTER: ICD-10-CM

## 2020-12-26 DIAGNOSIS — Z67.91 RH NEGATIVE STATE IN ANTEPARTUM PERIOD, FIRST TRIMESTER: ICD-10-CM

## 2020-12-26 LAB
ABO + RH BLD: NORMAL
ABO + RH BLD: NORMAL
BACTERIA SPEC CULT: NORMAL
BLD GP AB SCN SERPL QL: NORMAL
BLOOD BANK CMNT PATIENT-IMP: NORMAL
BLOOD BANK CMNT PATIENT-IMP: NORMAL
DATE RH IMM GL GVN: NORMAL
FETAL CELL SCN BLD QL ROSETTE: NORMAL
Lab: NORMAL
RH IG VIALS RECOM PATIENT: NORMAL
SPECIMEN SOURCE: NORMAL

## 2020-12-26 PROCEDURE — 85461 HEMOGLOBIN FETAL: CPT | Performed by: EMERGENCY MEDICINE

## 2020-12-26 PROCEDURE — 99284 EMERGENCY DEPT VISIT MOD MDM: CPT | Performed by: EMERGENCY MEDICINE

## 2020-12-26 PROCEDURE — 86901 BLOOD TYPING SEROLOGIC RH(D): CPT | Performed by: EMERGENCY MEDICINE

## 2020-12-26 PROCEDURE — 96372 THER/PROPH/DIAG INJ SC/IM: CPT | Performed by: EMERGENCY MEDICINE

## 2020-12-26 PROCEDURE — 250N000011 HC RX IP 250 OP 636: Performed by: EMERGENCY MEDICINE

## 2020-12-26 PROCEDURE — 86900 BLOOD TYPING SEROLOGIC ABO: CPT | Performed by: EMERGENCY MEDICINE

## 2020-12-26 PROCEDURE — 86850 RBC ANTIBODY SCREEN: CPT | Performed by: EMERGENCY MEDICINE

## 2020-12-26 RX ADMIN — HUMAN RHO(D) IMMUNE GLOBULIN 300 MCG: 300 INJECTION, SOLUTION INTRAMUSCULAR at 12:03

## 2020-12-26 NOTE — ED TRIAGE NOTES
Pt called requesting clarification for when she should be seen again for a rhogam shot. Writer looked through pts ED notes from Dr. Marks but was unable to determine an answer. Will discuss with Dr. Tomlinson and call pt back at [phone].

## 2020-12-26 NOTE — DISCHARGE INSTRUCTIONS
Return to the emergency department if you develop new or worsening symptoms.  Please follow-up with your doctor on Monday as recommended previously.  I hope the bleeding stops and that you feel better.  Your blood type is a negative.

## 2020-12-26 NOTE — ED PROVIDER NOTES
History     Chief Complaint   Patient presents with     Vaginal Bleeding     The history is provided by the patient.     Ethan Watt is a 35 year old female who presents to the emergency department secondary to ongoing vaginal bleeding. The patient was seen yesterday for vaginal bleeding. Transvaginal US showed a small gestational sac within the uterus that could represent early pregnancy, which makes an ectopic pregnancy unlikely at this time. She was not given RhoGam and was scheduled to follow up with her PCP 2 days from now. Quantitative pregnancy level was 417 at the time. Her vaginal bleeding has continued and worsened. She is now passing larger clots that are about the size of a quarter. She has constant, mild abdominal cramping. This is the patient's second pregnancy. During her first pregnancy she had issue with spotting, low platelet counts, a cholecystectomy and RhoGam was administered. Patient is known to be RH negative.     Allergies:  No Known Allergies    Problem List:    Patient Active Problem List    Diagnosis Date Noted     Thrombocytopenia (H) 03/04/2019     Priority: Medium     Repeat Platelets weekly.  Clinically impression still is gestational thrombocytopenia. No further testing/special management as long as platelets remain above 70k. Per Hematology       Calculus of gallbladder 12/31/2018     Priority: Medium     H/O LEEP 12/31/2018     Priority: Medium        Past Medical History:    History reviewed. No pertinent past medical history.    Past Surgical History:    Past Surgical History:   Procedure Laterality Date     LAPAROSCOPIC CHOLECYSTECTOMY N/A 1/16/2019    Procedure: LAPAROSCOPIC CHOLECYSTECTOMY;  Surgeon: Davey Rinaldi MD;  Location:  OR       Family History:    Family History   Problem Relation Age of Onset     Myocardial Infarction Maternal Grandfather      Other - See Comments Maternal Grandfather         stroke     Other - See Comments Maternal Uncle         stroke        Social History:  Marital Status:   [2]  Social History     Tobacco Use     Smoking status: Former Smoker     Quit date: 2014     Years since quittin.0     Smokeless tobacco: Never Used   Substance Use Topics     Alcohol use: Yes     Frequency: Never     Drug use: No        Medications:         Prenatal Vit-Fe Fumarate-FA (PRENATAL MULTIVITAMIN W/IRON) 27-0.8 MG tablet       UNABLE TO FIND          Review of Systems   All other systems reviewed and are negative.      Physical Exam   BP: 110/75  Pulse: 70  Temp: 98.1  F (36.7  C)  Resp: 16  Weight: 72.3 kg (159 lb 4.8 oz)  SpO2: 99 %      Physical Exam  Vitals signs and nursing note reviewed.   Constitutional:       Appearance: Normal appearance.   HENT:      Head: Normocephalic and atraumatic.      Nose: Nose normal.      Mouth/Throat:      Pharynx: No oropharyngeal exudate or posterior oropharyngeal erythema.   Eyes:      General:         Right eye: No discharge.         Left eye: No discharge.      Extraocular Movements: Extraocular movements intact.      Conjunctiva/sclera: Conjunctivae normal.   Neck:      Musculoskeletal: Normal range of motion.   Cardiovascular:      Rate and Rhythm: Normal rate.      Heart sounds: No friction rub.   Pulmonary:      Effort: Pulmonary effort is normal.   Musculoskeletal: Normal range of motion.         General: No swelling.   Skin:     General: Skin is warm.      Capillary Refill: Capillary refill takes less than 2 seconds.   Neurological:      Mental Status: She is alert.   Psychiatric:         Mood and Affect: Mood normal.         Behavior: Behavior normal.         Thought Content: Thought content normal.         ED Course        Procedures                   Results for orders placed or performed during the hospital encounter of 20 (from the past 24 hour(s))   Rh Immune Globulin Study   Result Value Ref Range    ABO A     RH(D) Neg     Fetal Blood Screen Canceled, Test credited     Blood Bank  Comment Suitable for Rh Immunoglobulin     RhIg Administered 12.26.20     Amount of RHIG Required 300 micrograms Rh Immune Globulin required    Antibody screen red cell   Result Value Ref Range    Antibody Screen Neg    Rho (D) immune globulin (RhoGam) Lab Study   Result Value Ref Range    Rhogam Order See Rhogam Study/Suitability        Medications   rho(D) immune globulin (RHOGAM) injection 300 mcg (300 mcg Intramuscular Given 12/26/20 1203)       Assessments & Plan (with Medical Decision Making)  Ethan Watt is a 35 year old who presents with concerns of ongoing vaginal bleeding that has increased since being seen in the ED yesterday. Transvaginal US yesterday showed a small gestational sac within the uterus that could represent an early pregnancy. As of right now an ectopic pregnancy is unlikely. RhoGam was not administered and patient was scheduled to follow up with her PCP in 2 days. Upon arrival in the ED today she is afebrile and her vitals are stable.    Blood work was obtained to determine if the patient is eligible for RhoGam to be administered since she is known to be RH negative. She was checked again, showing Rh negative. The patient should follow up with her PCP in 2 days as planned. I explained that they may want to repeat the US and re-check hCG levels. If her pain or vaginal bleeding worsens she should return to the ED for further evaluation. All concerns and questions were addressed. Patient was in agreement with this treatment plan and was discharged in no acute distress.      I have reviewed the nursing notes.    I have reviewed the findings, diagnosis, plan and need for follow up with the patient.      Discharge Medication List as of 12/26/2020 12:05 PM          Final diagnoses:   Vaginal bleeding in pregnancy, first trimester   Rh negative state in antepartum period, first trimester       This document serves as a record of services personally performed by Fernando Tomlinson MD. It  was created on their behalf by Yadi Quan, a trained medical scribe. The creation of this record is based on the provider's personal observations and the statements of the patient. This document has been checked and approved by the attending provider.    Note: Chart documentation done in part with Dragon Voice Recognition software. Although reviewed after completion, some word and grammatical errors may remain.    12/26/2020   [facility] EMERGENCY DEPT     Fernando Tomlinson MD  12/26/20 3920

## 2020-12-26 NOTE — ED TRIAGE NOTES
"Was here yesterday for possible miscarriage, was told to come back in for more bleeding. States is having more bleeding especially when she goes to the bathroom, \"lots of clots.\"  "

## 2020-12-26 NOTE — ED AVS SNAPSHOT
[facility] Emergency Dept  [address] [place] [address]  Phone: [phone]  Fax: [phone]                                    Ethan Watt   MRN: [id number]    Department: [facility] Emergency Dept   Date of Visit: 12/26/2020           After Visit Summary Signature Page    I have received my discharge instructions, and my questions have been answered. I have discussed any challenges I see with this plan with the nurse or doctor.    ..........................................................................................................................................  Patient/Patient Representative Signature      ..........................................................................................................................................  Patient Representative Print Name and Relationship to Patient    ..................................................               ................................................  Date                                   Time    ..........................................................................................................................................  Reviewed by Signature/Title    ...................................................              ..............................................  Date                                               Time          22EPIC Rev 08/18

## 2020-12-28 ENCOUNTER — TELEPHONE (OUTPATIENT)
Dept: OBGYN | Facility: OTHER | Age: 35
End: 2020-12-28

## 2020-12-28 ENCOUNTER — OFFICE VISIT (OUTPATIENT)
Dept: OBGYN | Facility: CLINIC | Age: 35
End: 2020-12-28
Payer: COMMERCIAL

## 2020-12-28 VITALS
DIASTOLIC BLOOD PRESSURE: 66 MMHG | TEMPERATURE: 98.4 F | OXYGEN SATURATION: 99 % | BODY MASS INDEX: 25 KG/M2 | HEART RATE: 58 BPM | SYSTOLIC BLOOD PRESSURE: 114 MMHG | WEIGHT: 159.6 LBS

## 2020-12-28 DIAGNOSIS — O20.0 THREATENED MISCARRIAGE: Primary | ICD-10-CM

## 2020-12-28 DIAGNOSIS — O20.0 THREATENED MISCARRIAGE: ICD-10-CM

## 2020-12-28 DIAGNOSIS — O20.9 FIRST TRIMESTER BLEEDING: Primary | ICD-10-CM

## 2020-12-28 LAB — B-HCG SERPL-ACNC: 23 IU/L (ref 0–5)

## 2020-12-28 PROCEDURE — 84702 CHORIONIC GONADOTROPIN TEST: CPT | Performed by: OBSTETRICS & GYNECOLOGY

## 2020-12-28 PROCEDURE — 36415 COLL VENOUS BLD VENIPUNCTURE: CPT | Performed by: OBSTETRICS & GYNECOLOGY

## 2020-12-28 PROCEDURE — 99213 OFFICE O/P EST LOW 20 MIN: CPT | Performed by: OBSTETRICS & GYNECOLOGY

## 2020-12-28 NOTE — TELEPHONE ENCOUNTER
Sherly De Dios DO  [facility] Triage 1 hour ago (7:39 AM)     It looks like this patient may be having a miscarriage or a pregnancy that is that very early.  Please call patient and follow up on her signs and symptoms.  I recommend serial quants and placed those orders.  Thanks!     Sherly De Dios DO      Pt is already scheduled with Dr. Curiel today, 12/28, for an ED f/u.  I did speak with her on the phone and her bleeding is less.      Lakeisha Mcneal RN

## 2020-12-28 NOTE — PROGRESS NOTES
Clinic Note    CC:    Chief Complaint   Patient presents with     Hospital F/U      HPI:  Ethan Watt is a 35 year old  at 6w4d by LMP, who presents for vaginal bleeding since Friday, now spotting, no cramping. No N/V, no breast pain. Planned pregnancy.     Ob Hx:  s/p , c/b gestational thrombocytopenia   Gyn Hx: Patient's last menstrual period was 2020 (exact date).     PMH:   History reviewed. No pertinent past medical history.  SurgHx:   Past Surgical History:   Procedure Laterality Date     LAPAROSCOPIC CHOLECYSTECTOMY N/A 2019    Procedure: LAPAROSCOPIC CHOLECYSTECTOMY;  Surgeon: Davey Rinaldi MD;  Location: [facility]     FamHx:   Family History   Problem Relation Age of Onset     Myocardial Infarction Maternal Grandfather      Other - See Comments Maternal Grandfather         stroke     Other - See Comments Maternal Uncle         stroke     SocHx:   Social History     Socioeconomic History     Marital status:      Spouse name: None     Number of children: None     Years of education: None     Highest education level: None   Occupational History     None   Social Needs     Financial resource strain: None     Food insecurity     Worry: None     Inability: None     Transportation needs     Medical: None     Non-medical: None   Tobacco Use     Smoking status: Former Smoker     Quit date: 2014     Years since quittin.0     Smokeless tobacco: Never Used   Substance and Sexual Activity     Alcohol use: Yes     Frequency: Never     Drug use: No     Sexual activity: Yes     Partners: Male     Birth control/protection: None     Comment: pregnant   Lifestyle     Physical activity     Days per week: None     Minutes per session: None     Stress: None   Relationships     Social connections     Talks on phone: None     Gets together: None     Attends Am[employer] service: None     Active member of club or organization: None     Attends meetings of clubs or organizations: None      Relationship status: None     Intimate partner violence     Fear of current or ex partner: None     Emotionally abused: None     Physically abused: None     Forced sexual activity: None   Other Topics Concern     Parent/sibling w/ CABG, MI or angioplasty before 65F 55M? Not Asked   Social History Narrative     None     Allergies:   Patient has no known allergies.  Current Medications:   Current Outpatient Medications   Medication Sig Dispense Refill     Prenatal Vit-Fe Fumarate-FA (PRENATAL MULTIVITAMIN W/IRON) 27-0.8 MG tablet Take 1 tablet by mouth daily       UNABLE TO FIND MEDICATION NAME: Comfort Tone       ROS: 10 point ROS negative other than as noted in the HPI    EXAM:  Vitals:    20 1108   BP: 114/66   BP Location: Right arm   Patient Position: Chair   Cuff Size: Adult Regular   Pulse: 58   Temp: 98.4  F (36.9  C)   TempSrc: Temporal   SpO2: 99%   Weight: 72.4 kg (159 lb 9.6 oz)    Body mass index is 25 kg/m  (pended).    Gen: Alert, oriented, appropriately interactive, NAD  CV: RRR, 2+ peripheral pulses  Resp: CTAB, good effort without distress   Abdomen: soft, non tender, non distended, no masses  MSK: normal gait, symmetric movements UE & LE  Lower extremities: non-tender, no edema    Labs & Imaging:  Results for orders placed or performed in visit on 20 (from the past 24 hour(s))   HCG Quantitative Pregnancy, Blood (RVU600)   Result Value Ref Range    HCG Quantitative Serum 23 (H) 0 - 5 IU/L     ASSESSMENT/PLAN: Ethan Watt is a 35 year old  at 6w4d by LMP, who presents for vaginal bleeding since Friday, now spotting, no cramping.    1. First trimester bleeding  - beta hCG 417 > 23, reviewed SAB vs MAB  - HCG Quantitative Pregnancy, Blood ([id number])    2. Threatened miscarriage  - Discussed all options, she would prefer expectant management for now, will return vs call with any abnormal vs prolonged vs sever bleeding.     Abelino Curiel MD   2020 11:41 AM

## 2020-12-28 NOTE — TELEPHONE ENCOUNTER
Pt states that her bleeding has slowed down some since she was seen in the ED on 12/25 and 12/26.  She states she is mainly just noticing some blood clots when she goes to the bathroom and when she wipes.  Scheduled pt to see Dr. Curiel in [place] today for further evaluation.  Pt states the ED doc advised her that she f/u with labs, she was unsure what labs.  I explained that she probably needs a f/u HCG quant.  Pt asked if she could just do the lab and not have an appt.  I explained to her that it would be important for her to be seen as well for further evaluation by an OB/GYN provider in case there was anything else that needed to be done like a wet prep or UA to help explain causes for spotting/bleedingn in early pregnancy.    Pt verbalized understanding and agreed to plan.    Lakeisha Mcneal RN

## 2020-12-28 NOTE — TELEPHONE ENCOUNTER
Reason for Call:  Other appointment    Detailed comments: Patient is calling in today, she is four weeks pregnant and went to the ER over the weekend. Patient was bleeding all weekend and she is not sure if she is miss carrying. The ER doctor told her to go see an OB on Monday to find out what is going on. Is there anyway she could be worked into your schedule today or is there anything she can do to see if she miss carried? Can someone give her a call she is willing to drive where ever to be seen. Thank you    Phone Number Patient can be reached at: Home number on file [phone] (home)    Best Time: anytime    Can we leave a detailed message on this number? YES    Call taken on 12/28/2020 at 7:11 AM by Jasmina Lozano

## 2021-01-03 ENCOUNTER — HEALTH MAINTENANCE LETTER (OUTPATIENT)
Age: 36
End: 2021-01-03

## 2021-03-23 ENCOUNTER — PRENATAL OFFICE VISIT (OUTPATIENT)
Dept: OBGYN | Facility: CLINIC | Age: 36
End: 2021-03-23
Payer: COMMERCIAL

## 2021-03-23 VITALS
WEIGHT: 164 LBS | SYSTOLIC BLOOD PRESSURE: 100 MMHG | HEIGHT: 67 IN | HEART RATE: 73 BPM | OXYGEN SATURATION: 100 % | DIASTOLIC BLOOD PRESSURE: 61 MMHG | BODY MASS INDEX: 25.74 KG/M2

## 2021-03-23 DIAGNOSIS — Z34.91 NORMAL PREGNANCY IN FIRST TRIMESTER: Primary | ICD-10-CM

## 2021-03-23 DIAGNOSIS — O09.521 MULTIGRAVIDA OF ADVANCED MATERNAL AGE IN FIRST TRIMESTER: ICD-10-CM

## 2021-03-23 DIAGNOSIS — D69.6 THROMBOCYTOPENIA (H): ICD-10-CM

## 2021-03-23 PROBLEM — Z23 NEED FOR TDAP VACCINATION: Status: ACTIVE | Noted: 2018-12-31

## 2021-03-23 LAB
BASOPHILS # BLD AUTO: 0.1 10E9/L (ref 0–0.2)
BASOPHILS NFR BLD AUTO: 0.7 %
DIFFERENTIAL METHOD BLD: ABNORMAL
EOSINOPHIL # BLD AUTO: 0.1 10E9/L (ref 0–0.7)
EOSINOPHIL NFR BLD AUTO: 1 %
ERYTHROCYTE [DISTWIDTH] IN BLOOD BY AUTOMATED COUNT: 12.2 % (ref 10–15)
HCT VFR BLD AUTO: 38.7 % (ref 35–47)
HGB BLD-MCNC: 12.8 G/DL (ref 11.7–15.7)
IMM GRANULOCYTES # BLD: 0 10E9/L (ref 0–0.4)
IMM GRANULOCYTES NFR BLD: 0.3 %
LYMPHOCYTES # BLD AUTO: 2.3 10E9/L (ref 0.8–5.3)
LYMPHOCYTES NFR BLD AUTO: 20.1 %
MCH RBC QN AUTO: 30.9 PG (ref 26.5–33)
MCHC RBC AUTO-ENTMCNC: 33.1 G/DL (ref 31.5–36.5)
MCV RBC AUTO: 94 FL (ref 78–100)
MONOCYTES # BLD AUTO: 0.8 10E9/L (ref 0–1.3)
MONOCYTES NFR BLD AUTO: 7.1 %
NEUTROPHILS # BLD AUTO: 8.1 10E9/L (ref 1.6–8.3)
NEUTROPHILS NFR BLD AUTO: 70.8 %
PLATELET # BLD AUTO: 193 10E9/L (ref 150–450)
RBC # BLD AUTO: 4.14 10E12/L (ref 3.8–5.2)
WBC # BLD AUTO: 11.5 10E9/L (ref 4–11)

## 2021-03-23 PROCEDURE — 36415 COLL VENOUS BLD VENIPUNCTURE: CPT | Performed by: OBSTETRICS & GYNECOLOGY

## 2021-03-23 PROCEDURE — 99000 SPECIMEN HANDLING OFFICE-LAB: CPT | Performed by: OBSTETRICS & GYNECOLOGY

## 2021-03-23 PROCEDURE — 87591 N.GONORRHOEAE DNA AMP PROB: CPT | Performed by: OBSTETRICS & GYNECOLOGY

## 2021-03-23 PROCEDURE — 86780 TREPONEMA PALLIDUM: CPT | Mod: 90 | Performed by: OBSTETRICS & GYNECOLOGY

## 2021-03-23 PROCEDURE — 85025 COMPLETE CBC W/AUTO DIFF WBC: CPT | Performed by: OBSTETRICS & GYNECOLOGY

## 2021-03-23 PROCEDURE — 86762 RUBELLA ANTIBODY: CPT | Performed by: OBSTETRICS & GYNECOLOGY

## 2021-03-23 PROCEDURE — 87389 HIV-1 AG W/HIV-1&-2 AB AG IA: CPT | Performed by: OBSTETRICS & GYNECOLOGY

## 2021-03-23 PROCEDURE — 99207 PR FIRST OB VISIT: CPT | Performed by: OBSTETRICS & GYNECOLOGY

## 2021-03-23 PROCEDURE — 86850 RBC ANTIBODY SCREEN: CPT | Performed by: OBSTETRICS & GYNECOLOGY

## 2021-03-23 PROCEDURE — 87491 CHLMYD TRACH DNA AMP PROBE: CPT | Performed by: OBSTETRICS & GYNECOLOGY

## 2021-03-23 PROCEDURE — 86900 BLOOD TYPING SEROLOGIC ABO: CPT | Performed by: OBSTETRICS & GYNECOLOGY

## 2021-03-23 PROCEDURE — 86901 BLOOD TYPING SEROLOGIC RH(D): CPT | Performed by: OBSTETRICS & GYNECOLOGY

## 2021-03-23 PROCEDURE — G0145 SCR C/V CYTO,THINLAYER,RESCR: HCPCS | Performed by: OBSTETRICS & GYNECOLOGY

## 2021-03-23 PROCEDURE — 87340 HEPATITIS B SURFACE AG IA: CPT | Performed by: OBSTETRICS & GYNECOLOGY

## 2021-03-23 PROCEDURE — 87086 URINE CULTURE/COLONY COUNT: CPT | Performed by: OBSTETRICS & GYNECOLOGY

## 2021-03-23 PROCEDURE — 87624 HPV HI-RISK TYP POOLED RSLT: CPT | Performed by: OBSTETRICS & GYNECOLOGY

## 2021-03-23 ASSESSMENT — MIFFLIN-ST. JEOR: SCORE: 1461.53

## 2021-03-23 NOTE — PROGRESS NOTES
HPI:    Ethan is a 36 year old yo  at 8 3/7 weeks by LMP 2021 who presents today for her initial OB visit.  Patient is due for a pap smear today.      Issues: None    Past OB Hx: 1 .  1 SAB     Father of baby: No health issues       History reviewed. No pertinent past medical history.          Past Surgical History:   Procedure Laterality Date     LAPAROSCOPIC CHOLECYSTECTOMY N/A 2019    Procedure: LAPAROSCOPIC CHOLECYSTECTOMY;  Surgeon: Davey Rinaldi MD;  Location:  OR        Family History   Problem Relation Age of Onset     Myocardial Infarction Maternal Grandfather      Other - See Comments Maternal Grandfather         stroke     Other - See Comments Maternal Uncle         stroke        Social History     Socioeconomic History     Marital status:      Spouse name: Not on file     Number of children: Not on file     Years of education: Not on file     Highest education level: Not on file   Occupational History     Not on file   Social Needs     Financial resource strain: Not on file     Food insecurity     Worry: Not on file     Inability: Not on file     Transportation needs     Medical: Not on file     Non-medical: Not on file   Tobacco Use     Smoking status: Former Smoker     Quit date: 2014     Years since quittin.2     Smokeless tobacco: Never Used   Substance and Sexual Activity     Alcohol use: Yes     Frequency: Never     Drug use: No     Sexual activity: Yes     Partners: Male     Birth control/protection: None     Comment: pregnant   Lifestyle     Physical activity     Days per week: Not on file     Minutes per session: Not on file     Stress: Not on file   Relationships     Social connections     Talks on phone: Not on file     Gets together: Not on file     Attends Rastafari service: Not on file     Active member of club or organization: Not on file     Attends meetings of clubs or organizations: Not on file     Relationship status: Not on file     Intimate  partner violence     Fear of current or ex partner: Not on file     Emotionally abused: Not on file     Physically abused: Not on file     Forced sexual activity: Not on file   Other Topics Concern     Parent/sibling w/ CABG, MI or angioplasty before 65F 55M? Not Asked   Social History Narrative     Not on file         Current Outpatient Medications:      Prenatal Vit-Fe Fumarate-FA (PRENATAL MULTIVITAMIN W/IRON) 27-0.8 MG tablet, Take 1 tablet by mouth daily, Disp: , Rfl:      UNABLE TO FIND, MEDICATION NAME: Comfort Tone, Disp: , Rfl:       Objective:  Physical Exam:   Breast:  Benign exam, no masses palpated.  No skin changes, no axillary lymphadenopathy, no nipple discharge.  Axilla feel completely normal, no lymph node enlargement and non-tender.  Heart=RRR, no murmurs  Thyroid=normal, no masses, no TTP  Lungs=Clear to ascultation bilateral, non-labored breathing  Abd=soft, Nontender/nondistended, +bowel sounds x4  PELVIC:    External genitalia: normal without lesion  Vagina: normal mucosa and rugae, no discharge.  Cervix: normal without lesion, healthy, multiparous, pap smear, GC and Chlamydia obtained  Uterus: small, mobile, nontender.  Adnexa: non tender, without masses  Rectal: deffered  Ext=no clubbing or cyanosis  FHTs=present on BSUS    Assessment:   1.  IUP at 8 3/7 weeks here for her initial OB visit    Plan:    1.  PNV  2.  NOB Labs and ultrasound   3.  Discussed routine prenatal care, quad screen, GCT, anatomy scan at ~19 weeks, frequency of visits.  4.  Discussed first trimester screen and she wants this done.  Referral placed.  5.  Delivery hospital: [facility]  6.  RTC 4 weeks.  7.  AMA=level II ultrasound.  Referral sent today.  8.  Thrombocytopenia      Discussed physician coverage, tertiary support, diet, exercise, weight gain, schedule of visits, routine and indicated ultrasounds, and childbirth education.    Options for  testing for chromosomal and birth defects were discussed with the  patient. Diagnostic tests include CVS and Amniocentesis. We discussed that these tests are definitive but invasive and do carry a risk of fetal loss.   Screening tests include nuchal translucency/blood marker testing in the first trimester and quad screening in the second trimester. We discussed that these are screening tests and not diagnostic tests and that false positives and negatives are a distinct possibility.     25 minutes was spent face to face with the patient today discussing her history, diagnosis, and follow-up plan as noted above.  Over 50% of the visit was spent in counseling and coordination of care.    Discussed aspirin use in pregnancy.  Low-dose aspirin prophylaxis can be beneficial in women at high risk of developing preeclampsia.  I generally recommend we begin aspirin at about 12-13 weeks gestation and continue until at least 36 weeks.    Women with at least one of the following conditions are considered high risk for developing preeclampsia: Previous pregnancy with preeclampsia,  multifetal-gestation, chronic hypertension, diabetes mellitus, chronic kidney disease, autoimmune disease.    Women with more than 1 of the following conditions may also consider low-dose aspirin prophylaxis in pregnancy: Nulliparity, BMI greater than 30, family history of preeclampsia (mother or sister), AMA, socio-demographic characteristics, personal risk factors.      Patient does not meet the above criteria.  Discussed risks and benefits of low dose Asprin therapy and she elects to proceed.     Total Visit Time: 30 minutes      Sherly De Dios DO

## 2021-03-23 NOTE — PATIENT INSTRUCTIONS
Please call if you any questions.    [facility]  [address] [place]   [address]  [phone]        Sherly De Dios,

## 2021-03-24 PROBLEM — O26.899 RH NEGATIVE STATE IN ANTEPARTUM PERIOD: Status: ACTIVE | Noted: 2018-12-31

## 2021-03-24 PROBLEM — Z67.91 RH NEGATIVE STATE IN ANTEPARTUM PERIOD: Status: ACTIVE | Noted: 2018-12-31

## 2021-03-24 LAB
ABO + RH BLD: NORMAL
ABO + RH BLD: NORMAL
BACTERIA SPEC CULT: NO GROWTH
BLD GP AB SCN SERPL QL: NORMAL
BLOOD BANK CMNT PATIENT-IMP: NORMAL
C TRACH DNA SPEC QL NAA+PROBE: NEGATIVE
HBV SURFACE AG SERPL QL IA: NONREACTIVE
HIV 1+2 AB+HIV1 P24 AG SERPL QL IA: NONREACTIVE
Lab: NORMAL
N GONORRHOEA DNA SPEC QL NAA+PROBE: NEGATIVE
RUBV IGG SERPL IA-ACNC: 12 IU/ML
SPECIMEN EXP DATE BLD: NORMAL
SPECIMEN SOURCE: NORMAL
T PALLIDUM AB SER QL: NONREACTIVE

## 2021-03-26 LAB
COPATH REPORT: NORMAL
PAP: NORMAL

## 2021-03-29 LAB
FINAL DIAGNOSIS: NORMAL
HPV HR 12 DNA CVX QL NAA+PROBE: NEGATIVE
HPV16 DNA SPEC QL NAA+PROBE: NEGATIVE
HPV18 DNA SPEC QL NAA+PROBE: NEGATIVE
SPECIMEN DESCRIPTION: NORMAL
SPECIMEN SOURCE CVX/VAG CYTO: NORMAL

## 2021-03-31 ENCOUNTER — PATIENT OUTREACH (OUTPATIENT)
Dept: OBGYN | Facility: CLINIC | Age: 36
End: 2021-03-31

## 2021-04-01 ENCOUNTER — ANCILLARY PROCEDURE (OUTPATIENT)
Dept: ULTRASOUND IMAGING | Facility: OTHER | Age: 36
End: 2021-04-01
Attending: OBSTETRICS & GYNECOLOGY
Payer: COMMERCIAL

## 2021-04-01 DIAGNOSIS — Z34.91 NORMAL PREGNANCY IN FIRST TRIMESTER: ICD-10-CM

## 2021-04-21 ENCOUNTER — TRANSFERRED RECORDS (OUTPATIENT)
Dept: HEALTH INFORMATION MANAGEMENT | Facility: CLINIC | Age: 36
End: 2021-04-21

## 2021-04-23 ENCOUNTER — PRENATAL OFFICE VISIT (OUTPATIENT)
Dept: OBGYN | Facility: OTHER | Age: 36
End: 2021-04-23
Payer: COMMERCIAL

## 2021-04-23 VITALS — SYSTOLIC BLOOD PRESSURE: 120 MMHG | DIASTOLIC BLOOD PRESSURE: 80 MMHG | BODY MASS INDEX: 25.37 KG/M2 | WEIGHT: 162 LBS

## 2021-04-23 DIAGNOSIS — O09.522 MULTIGRAVIDA OF ADVANCED MATERNAL AGE IN SECOND TRIMESTER: ICD-10-CM

## 2021-04-23 DIAGNOSIS — Z34.92 NORMAL PREGNANCY IN SECOND TRIMESTER: Primary | ICD-10-CM

## 2021-04-23 PROCEDURE — 99207 PR PRENATAL VISIT: CPT | Performed by: OBSTETRICS & GYNECOLOGY

## 2021-04-23 NOTE — PROGRESS NOTES
36 year old  at 12w6d weeks presents to the clinic for a routine prenatal visit.  Feeling well.  No vaginal bleeding, leakage of fluid, or contractions   Fundal height=s=d  NABy=412  Anatomy ultrasound already scheduled  RTC 4 weeks.    Sherly De Dios DO

## 2021-04-23 NOTE — PATIENT INSTRUCTIONS
Please call if you any questions.    [address]   [address]  [phone]        Sherly De Dios,

## 2021-04-26 ENCOUNTER — TELEPHONE (OUTPATIENT)
Dept: OBGYN | Facility: CLINIC | Age: 36
End: 2021-04-26

## 2021-04-26 NOTE — TELEPHONE ENCOUNTER
RN received normal (negative) innatal results from [facility].    Pt informed of results on 4/26/2021 by A.S. Gender is blacked out on form. RN placed form in STAT scanning.    Zenaida Betancur RN on 4/26/2021 at 3:21 PM

## 2021-05-17 ENCOUNTER — HOSPITAL ENCOUNTER (EMERGENCY)
Facility: CLINIC | Age: 36
Discharge: HOME | End: 2021-05-17
Attending: EMERGENCY MEDICINE | Admitting: EMERGENCY MEDICINE
Payer: COMMERCIAL

## 2021-05-17 VITALS
BODY MASS INDEX: 25.53 KG/M2 | WEIGHT: 163 LBS | SYSTOLIC BLOOD PRESSURE: 110 MMHG | DIASTOLIC BLOOD PRESSURE: 65 MMHG | HEART RATE: 89 BPM | RESPIRATION RATE: 16 BRPM | OXYGEN SATURATION: 99 % | TEMPERATURE: 99.6 F

## 2021-05-17 DIAGNOSIS — J18.9 PNEUMONIA DUE TO INFECTIOUS ORGANISM, UNSPECIFIED LATERALITY, UNSPECIFIED PART OF LUNG: ICD-10-CM

## 2021-05-17 DIAGNOSIS — H65.03 BILATERAL ACUTE SEROUS OTITIS MEDIA, RECURRENCE NOT SPECIFIED: ICD-10-CM

## 2021-05-17 LAB
ALBUMIN SERPL-MCNC: 3.1 G/DL (ref 3.4–5)
ALP SERPL-CCNC: 59 U/L (ref 40–150)
ALT SERPL W P-5'-P-CCNC: 14 U/L (ref 0–50)
ANION GAP SERPL CALCULATED.3IONS-SCNC: 4 MMOL/L (ref 3–14)
AST SERPL W P-5'-P-CCNC: 15 U/L (ref 0–45)
BASOPHILS # BLD AUTO: 0 10E9/L (ref 0–0.2)
BASOPHILS NFR BLD AUTO: 0.2 %
BILIRUB SERPL-MCNC: 0.3 MG/DL (ref 0.2–1.3)
BUN SERPL-MCNC: 5 MG/DL (ref 7–30)
CALCIUM SERPL-MCNC: 8.8 MG/DL (ref 8.5–10.1)
CHLORIDE SERPL-SCNC: 104 MMOL/L (ref 94–109)
CO2 SERPL-SCNC: 26 MMOL/L (ref 20–32)
CREAT SERPL-MCNC: 0.55 MG/DL (ref 0.52–1.04)
DIFFERENTIAL METHOD BLD: ABNORMAL
EOSINOPHIL # BLD AUTO: 0 10E9/L (ref 0–0.7)
EOSINOPHIL NFR BLD AUTO: 0.1 %
ERYTHROCYTE [DISTWIDTH] IN BLOOD BY AUTOMATED COUNT: 12.5 % (ref 10–15)
GFR SERPL CREATININE-BSD FRML MDRD: >90 ML/MIN/{1.73_M2}
GLUCOSE SERPL-MCNC: 89 MG/DL (ref 70–99)
HCT VFR BLD AUTO: 36.2 % (ref 35–47)
HGB BLD-MCNC: 12.5 G/DL (ref 11.7–15.7)
IMM GRANULOCYTES # BLD: 0.1 10E9/L (ref 0–0.4)
IMM GRANULOCYTES NFR BLD: 0.7 %
LABORATORY COMMENT REPORT: NORMAL
LYMPHOCYTES # BLD AUTO: 0.8 10E9/L (ref 0.8–5.3)
LYMPHOCYTES NFR BLD AUTO: 4.9 %
MCH RBC QN AUTO: 31.8 PG (ref 26.5–33)
MCHC RBC AUTO-ENTMCNC: 34.5 G/DL (ref 31.5–36.5)
MCV RBC AUTO: 92 FL (ref 78–100)
MONOCYTES # BLD AUTO: 1.2 10E9/L (ref 0–1.3)
MONOCYTES NFR BLD AUTO: 7.4 %
NEUTROPHILS # BLD AUTO: 14.5 10E9/L (ref 1.6–8.3)
NEUTROPHILS NFR BLD AUTO: 86.7 %
NRBC # BLD AUTO: 0 10*3/UL
NRBC BLD AUTO-RTO: 0 /100
NT-PROBNP SERPL-MCNC: 234 PG/ML (ref 0–450)
PLATELET # BLD AUTO: 165 10E9/L (ref 150–450)
POTASSIUM SERPL-SCNC: 3.4 MMOL/L (ref 3.4–5.3)
PROT SERPL-MCNC: 7.4 G/DL (ref 6.8–8.8)
RBC # BLD AUTO: 3.93 10E12/L (ref 3.8–5.2)
SARS-COV-2 RNA RESP QL NAA+PROBE: NEGATIVE
SODIUM SERPL-SCNC: 134 MMOL/L (ref 133–144)
SPECIMEN SOURCE: NORMAL
WBC # BLD AUTO: 16.7 10E9/L (ref 4–11)

## 2021-05-17 PROCEDURE — U0005 INFEC AGEN DETEC AMPLI PROBE: HCPCS | Performed by: EMERGENCY MEDICINE

## 2021-05-17 PROCEDURE — C9803 HOPD COVID-19 SPEC COLLECT: HCPCS | Performed by: EMERGENCY MEDICINE

## 2021-05-17 PROCEDURE — U0003 INFECTIOUS AGENT DETECTION BY NUCLEIC ACID (DNA OR RNA); SEVERE ACUTE RESPIRATORY SYNDROME CORONAVIRUS 2 (SARS-COV-2) (CORONAVIRUS DISEASE [COVID-19]), AMPLIFIED PROBE TECHNIQUE, MAKING USE OF HIGH THROUGHPUT TECHNOLOGIES AS DESCRIBED BY CMS-2020-01-R: HCPCS | Performed by: EMERGENCY MEDICINE

## 2021-05-17 PROCEDURE — 83880 ASSAY OF NATRIURETIC PEPTIDE: CPT | Performed by: EMERGENCY MEDICINE

## 2021-05-17 PROCEDURE — 99283 EMERGENCY DEPT VISIT LOW MDM: CPT | Performed by: EMERGENCY MEDICINE

## 2021-05-17 PROCEDURE — 99284 EMERGENCY DEPT VISIT MOD MDM: CPT | Performed by: EMERGENCY MEDICINE

## 2021-05-17 PROCEDURE — 85025 COMPLETE CBC W/AUTO DIFF WBC: CPT | Performed by: EMERGENCY MEDICINE

## 2021-05-17 PROCEDURE — 80053 COMPREHEN METABOLIC PANEL: CPT | Performed by: EMERGENCY MEDICINE

## 2021-05-17 RX ORDER — CEFDINIR 300 MG/1
300 CAPSULE ORAL 2 TIMES DAILY
Qty: 20 CAPSULE | Refills: 0 | Status: SHIPPED | OUTPATIENT
Start: 2021-05-17 | End: 2021-06-28

## 2021-05-17 NOTE — ED TRIAGE NOTES
Here with increase shortness of breath upon exertion. States she started running a fever during the night. She is currently 16 weeks gestation

## 2021-05-17 NOTE — DISCHARGE INSTRUCTIONS
As discussed you have what appears to be pus behind your tympanic membranes in your ears on both sides.  They are not red or bleeding or look acutely infected but certainly you have a little fluid behind your eardrum.  Also you have an elevated white blood cell count and shortness of breath with fever.  We will need to treat you for pneumonia.  I am not sure whether this is Covid or bacterial pneumonia but the oral antibiotics will cover both your ear infection and if he had a bacterial pneumonia would cover that to.  I hope you get better quickly. Follow up with ENT at the phone number given for Dr. Torres.

## 2021-05-28 ENCOUNTER — PRENATAL OFFICE VISIT (OUTPATIENT)
Dept: OBGYN | Facility: OTHER | Age: 36
End: 2021-05-28
Payer: COMMERCIAL

## 2021-05-28 VITALS
WEIGHT: 165.75 LBS | BODY MASS INDEX: 25.96 KG/M2 | HEART RATE: 59 BPM | SYSTOLIC BLOOD PRESSURE: 110 MMHG | OXYGEN SATURATION: 97 % | DIASTOLIC BLOOD PRESSURE: 60 MMHG

## 2021-05-28 DIAGNOSIS — O09.522 MULTIGRAVIDA OF ADVANCED MATERNAL AGE IN SECOND TRIMESTER: Primary | ICD-10-CM

## 2021-05-28 PROBLEM — Z34.92 NORMAL PREGNANCY IN SECOND TRIMESTER: Status: RESOLVED | Noted: 2018-12-31 | Resolved: 2021-05-28

## 2021-05-28 PROCEDURE — 99207 PR PRENATAL VISIT: CPT | Performed by: OBSTETRICS & GYNECOLOGY

## 2021-05-28 NOTE — PATIENT INSTRUCTIONS
Please call if you any questions.    [address]   [address]  [phone]        Sherly De Dios,

## 2021-05-28 NOTE — PROGRESS NOTES
36 year old  at 17w6d weeks presents to the clinic for a routine prenatal visit.  Feeling well.  No vaginal bleeding, leakage of fluid, or contractions   Patient was dx a few weeks ago.  She finished the antibiotics yesterday and is feeling much better  Fundal height=s=d  MUEq=903  Discussed quad screen and she declines  AMA=Anatomy ultrasound schedule with MFM on 6/10  RTC 4 weeks.    Sherly De Dios, DO

## 2021-06-10 ENCOUNTER — TRANSFERRED RECORDS (OUTPATIENT)
Dept: HEALTH INFORMATION MANAGEMENT | Facility: CLINIC | Age: 36
End: 2021-06-10

## 2021-06-24 ENCOUNTER — TELEPHONE (OUTPATIENT)
Dept: OBGYN | Facility: OTHER | Age: 36
End: 2021-06-24

## 2021-06-24 NOTE — TELEPHONE ENCOUNTER
Per uptodate on Ciprodex otic solution during pregnancy:  Pregnancy Considerations  Ciprofloxacin and dexamethasone have minimal systemic absorption following otic administration, limiting any potential exposure to the fetus.    Notified Fallon from ENT that Ciprodex otic solution is fine for pt to use during pregnancy.    Lakeisha Mcneal RN

## 2021-06-24 NOTE — TELEPHONE ENCOUNTER
[facility] Call Center    Phone Message    May a detailed message be left on voicemail: yes     Reason for Call: [facility] requesting a call to discuss if the patient can use Ciprodex Otic Drops while pregnant.  Fallon stated if she does not answer it is ok to leave a detailed message.  Thank you.     Action Taken: Message routed to:  [facility] p [phone]    Travel Screening: Not Applicable

## 2021-06-28 ENCOUNTER — PRENATAL OFFICE VISIT (OUTPATIENT)
Dept: OBGYN | Facility: OTHER | Age: 36
End: 2021-06-28
Payer: COMMERCIAL

## 2021-06-28 VITALS — WEIGHT: 166 LBS | SYSTOLIC BLOOD PRESSURE: 118 MMHG | DIASTOLIC BLOOD PRESSURE: 66 MMHG | BODY MASS INDEX: 26 KG/M2

## 2021-06-28 DIAGNOSIS — O09.522 MULTIGRAVIDA OF ADVANCED MATERNAL AGE IN SECOND TRIMESTER: Primary | ICD-10-CM

## 2021-06-28 DIAGNOSIS — Z67.91 RH NEGATIVE STATE IN ANTEPARTUM PERIOD: ICD-10-CM

## 2021-06-28 DIAGNOSIS — O26.899 RH NEGATIVE STATE IN ANTEPARTUM PERIOD: ICD-10-CM

## 2021-06-28 DIAGNOSIS — D69.6 THROMBOCYTOPENIA (H): ICD-10-CM

## 2021-06-28 PROCEDURE — 99207 PR PRENATAL VISIT: CPT | Performed by: OBSTETRICS & GYNECOLOGY

## 2021-06-28 RX ORDER — CIPROFLOXACIN AND DEXAMETHASONE 3; 1 MG/ML; MG/ML
SUSPENSION/ DROPS AURICULAR (OTIC)
COMMUNITY
Start: 2021-06-24 | End: 2021-12-17

## 2021-06-28 NOTE — PROGRESS NOTES
36 year old  at 22w2d weeks presents to the clinic for a routine prenatal visit.  No concerns.  No leakage of fluid, vaginal bleeding, or contractions   Good fetal movement.  Fundal height: 23cm  FHTs: 144  Normal anatomy ultrasound but some anatomy not well seen.  Repeat ultrasound with MFM on   History of thrombocytopenia=will check next visit with GTT  RTC 4 weeks    Sherly De Dios DO

## 2021-07-08 ENCOUNTER — TRANSFERRED RECORDS (OUTPATIENT)
Dept: HEALTH INFORMATION MANAGEMENT | Facility: CLINIC | Age: 36
End: 2021-07-08

## 2021-07-26 ENCOUNTER — PRENATAL OFFICE VISIT (OUTPATIENT)
Dept: OBGYN | Facility: OTHER | Age: 36
End: 2021-07-26
Payer: COMMERCIAL

## 2021-07-26 VITALS — DIASTOLIC BLOOD PRESSURE: 64 MMHG | BODY MASS INDEX: 26.94 KG/M2 | SYSTOLIC BLOOD PRESSURE: 102 MMHG | WEIGHT: 172 LBS

## 2021-07-26 DIAGNOSIS — Z67.91 RH NEGATIVE STATE IN ANTEPARTUM PERIOD: ICD-10-CM

## 2021-07-26 DIAGNOSIS — O26.899 RH NEGATIVE STATE IN ANTEPARTUM PERIOD: ICD-10-CM

## 2021-07-26 DIAGNOSIS — D69.6 THROMBOCYTOPENIA (H): ICD-10-CM

## 2021-07-26 DIAGNOSIS — O09.522 MULTIGRAVIDA OF ADVANCED MATERNAL AGE IN SECOND TRIMESTER: Primary | ICD-10-CM

## 2021-07-26 LAB
ERYTHROCYTE [DISTWIDTH] IN BLOOD BY AUTOMATED COUNT: 13.3 % (ref 10–15)
GLUCOSE 1H P 50 G GLC PO SERPL-MCNC: 107 MG/DL (ref 70–129)
HCT VFR BLD AUTO: 34.2 % (ref 35–47)
HGB BLD-MCNC: 11.2 G/DL (ref 11.7–15.7)
MCH RBC QN AUTO: 32 PG (ref 26.5–33)
MCHC RBC AUTO-ENTMCNC: 32.7 G/DL (ref 31.5–36.5)
MCV RBC AUTO: 98 FL (ref 78–100)
PLATELET # BLD AUTO: 169 10E3/UL (ref 150–450)
RBC # BLD AUTO: 3.5 10E6/UL (ref 3.8–5.2)
WBC # BLD AUTO: 10.5 10E3/UL (ref 4–11)

## 2021-07-26 PROCEDURE — 82952 GTT-ADDED SAMPLES: CPT | Performed by: OBSTETRICS & GYNECOLOGY

## 2021-07-26 PROCEDURE — 36415 COLL VENOUS BLD VENIPUNCTURE: CPT | Performed by: OBSTETRICS & GYNECOLOGY

## 2021-07-26 PROCEDURE — 99207 PR PRENATAL VISIT: CPT | Performed by: OBSTETRICS & GYNECOLOGY

## 2021-07-26 PROCEDURE — 85027 COMPLETE CBC AUTOMATED: CPT | Performed by: OBSTETRICS & GYNECOLOGY

## 2021-07-26 NOTE — PROGRESS NOTES
36 year old  at 26w2d weeks presents to the clinic for a routine prenatal visit.  No concerns.  No vaginal bleeding, leakage of fluid, or contractions   Good fetal movement.  Fundal height= 25cm  FHTs= 150  Normal anatomy ultrasound but mild dilation of urinary tract.  Repeat ultrasound  on MFM  RTC 4 weeks  GCT today  Thrombocytopenia=CBC today  Blood type: A-  Rhogam next visit    Sherly De Dios DO

## 2021-08-10 ENCOUNTER — ALLIED HEALTH/NURSE VISIT (OUTPATIENT)
Dept: FAMILY MEDICINE | Facility: OTHER | Age: 36
End: 2021-08-10
Payer: COMMERCIAL

## 2021-08-10 ENCOUNTER — TELEPHONE (OUTPATIENT)
Dept: OBGYN | Facility: CLINIC | Age: 36
End: 2021-08-10

## 2021-08-10 DIAGNOSIS — O26.899 RH NEGATIVE STATE IN ANTEPARTUM PERIOD: Primary | ICD-10-CM

## 2021-08-10 DIAGNOSIS — Z23 NEED FOR TDAP VACCINATION: ICD-10-CM

## 2021-08-10 DIAGNOSIS — Z67.91 RH NEGATIVE STATE IN ANTEPARTUM PERIOD: Primary | ICD-10-CM

## 2021-08-10 PROCEDURE — 99207 PR NO CHARGE NURSE ONLY: CPT

## 2021-08-10 PROCEDURE — 90715 TDAP VACCINE 7 YRS/> IM: CPT

## 2021-08-10 PROCEDURE — 90471 IMMUNIZATION ADMIN: CPT

## 2021-08-10 NOTE — TELEPHONE ENCOUNTER
Yes, patient should come in and have a nurse only visit for Rhogam.  Please help her schedule this.  Thanks.    Sherly De Dios, DO

## 2021-08-10 NOTE — TELEPHONE ENCOUNTER
Spoke with patient - scheduled appointment for today with [facility] FP float schedule. Spoke with JOELLE AYALA to make sure this was okay, it was okay'd. Thanks!

## 2021-08-10 NOTE — TELEPHONE ENCOUNTER
Pt calling in as she missed her appt yesterday and is in need of a rogham injection. Pt currently 28w3d, last seen 7/26/2021.    RN routing to provider for orders and if this can be a nurse only visit. Then can call pt back to assist in scheduling.    Zenaida Betancur RN on 8/10/2021 at 8:28 AM

## 2021-08-23 ENCOUNTER — PRENATAL OFFICE VISIT (OUTPATIENT)
Dept: OBGYN | Facility: OTHER | Age: 36
End: 2021-08-23
Payer: COMMERCIAL

## 2021-08-23 VITALS — BODY MASS INDEX: 27.72 KG/M2 | DIASTOLIC BLOOD PRESSURE: 72 MMHG | SYSTOLIC BLOOD PRESSURE: 110 MMHG | WEIGHT: 177 LBS

## 2021-08-23 DIAGNOSIS — D69.6 THROMBOCYTOPENIA (H): ICD-10-CM

## 2021-08-23 DIAGNOSIS — O09.523 MULTIGRAVIDA OF ADVANCED MATERNAL AGE IN THIRD TRIMESTER: Primary | ICD-10-CM

## 2021-08-23 PROCEDURE — 99207 PR PRENATAL VISIT: CPT | Performed by: OBSTETRICS & GYNECOLOGY

## 2021-08-23 NOTE — PROGRESS NOTES
36 year old  at 30w2d weeks presents to the clinic for a routine prenatal visit.  No concerns. Patient denies any vaginal bleeding, leakage of fluid, or contractions   Good fetal movement.    Fundal height=31cm  SDCp=649  QRB=705  Hgb=11.2  Thrombocytopenia=will recheck Plts at 36 weeks  RTC 2 weeks    Sherly De Dios DO

## 2021-08-24 PROBLEM — O09.523 MULTIGRAVIDA OF ADVANCED MATERNAL AGE IN THIRD TRIMESTER: Status: ACTIVE | Noted: 2021-03-23

## 2021-09-07 ENCOUNTER — PRENATAL OFFICE VISIT (OUTPATIENT)
Dept: OBGYN | Facility: CLINIC | Age: 36
End: 2021-09-07
Payer: COMMERCIAL

## 2021-09-07 ENCOUNTER — TRANSFERRED RECORDS (OUTPATIENT)
Dept: HEALTH INFORMATION MANAGEMENT | Facility: CLINIC | Age: 36
End: 2021-09-07

## 2021-09-07 VITALS
WEIGHT: 179 LBS | DIASTOLIC BLOOD PRESSURE: 62 MMHG | BODY MASS INDEX: 28.04 KG/M2 | SYSTOLIC BLOOD PRESSURE: 108 MMHG | HEART RATE: 69 BPM | OXYGEN SATURATION: 100 %

## 2021-09-07 DIAGNOSIS — D69.6 THROMBOCYTOPENIA (H): ICD-10-CM

## 2021-09-07 DIAGNOSIS — O09.523 MULTIGRAVIDA OF ADVANCED MATERNAL AGE IN THIRD TRIMESTER: Primary | ICD-10-CM

## 2021-09-07 PROCEDURE — 99207 PR PRENATAL VISIT: CPT | Performed by: OBSTETRICS & GYNECOLOGY

## 2021-09-07 NOTE — PROGRESS NOTES
36 year old  at 32w3d weeks presents to the clinic for a routine prenatal visit.    No concerns.  Patient denies any vaginal bleeding, leakage of fluid, or contractions     Good fetal movement  Fundal height=33cm  TTYf=523  Cquvouakivbljdke=Ghiw=464 on   Discussed labor precautions.  Mild bilateral urinary tract dilation=repeat MFM ultrasound today  RTC 2 weeks    Sherly De Dios DO

## 2021-09-24 ENCOUNTER — PRENATAL OFFICE VISIT (OUTPATIENT)
Dept: OBGYN | Facility: OTHER | Age: 36
End: 2021-09-24
Payer: COMMERCIAL

## 2021-09-24 VITALS — BODY MASS INDEX: 28.35 KG/M2 | WEIGHT: 181 LBS | DIASTOLIC BLOOD PRESSURE: 70 MMHG | SYSTOLIC BLOOD PRESSURE: 106 MMHG

## 2021-09-24 DIAGNOSIS — O09.523 MULTIGRAVIDA OF ADVANCED MATERNAL AGE IN THIRD TRIMESTER: Primary | ICD-10-CM

## 2021-09-24 DIAGNOSIS — D69.6 THROMBOCYTOPENIA (H): ICD-10-CM

## 2021-09-24 DIAGNOSIS — Z98.890 H/O LEEP: ICD-10-CM

## 2021-09-24 PROCEDURE — 99207 PR PRENATAL VISIT: CPT | Performed by: OBSTETRICS & GYNECOLOGY

## 2021-09-24 NOTE — PROGRESS NOTES
36 year old  at 34w6d weeks presents to the clinic for a routine prenatal visit.    No concerns.  Patient denies any vaginal bleeding, leakage of fluid, or contractions     Good fetal movement  Fundal height=35cm  ZNKy=463  Thrombocytopenia=will check next visit  Discussed labor precautions.  RTC 1 weeks    Sherly De Dios DO

## 2021-10-04 ENCOUNTER — PRENATAL OFFICE VISIT (OUTPATIENT)
Dept: OBGYN | Facility: OTHER | Age: 36
End: 2021-10-04
Payer: COMMERCIAL

## 2021-10-04 VITALS — BODY MASS INDEX: 28.98 KG/M2 | DIASTOLIC BLOOD PRESSURE: 68 MMHG | WEIGHT: 185 LBS | SYSTOLIC BLOOD PRESSURE: 118 MMHG

## 2021-10-04 DIAGNOSIS — O09.523 MULTIGRAVIDA OF ADVANCED MATERNAL AGE IN THIRD TRIMESTER: Primary | ICD-10-CM

## 2021-10-04 DIAGNOSIS — O36.8390 FETAL BRADYCARDIA, ANTEPARTUM CONDITION OR COMPLICATION: ICD-10-CM

## 2021-10-04 DIAGNOSIS — D69.6 THROMBOCYTOPENIA (H): ICD-10-CM

## 2021-10-04 LAB
ERYTHROCYTE [DISTWIDTH] IN BLOOD BY AUTOMATED COUNT: 13.4 % (ref 10–15)
HCT VFR BLD AUTO: 36 % (ref 35–47)
HGB BLD-MCNC: 12 G/DL (ref 11.7–15.7)
MCH RBC QN AUTO: 31.3 PG (ref 26.5–33)
MCHC RBC AUTO-ENTMCNC: 33.3 G/DL (ref 31.5–36.5)
MCV RBC AUTO: 94 FL (ref 78–100)
PLATELET # BLD AUTO: 127 10E3/UL (ref 150–450)
RBC # BLD AUTO: 3.84 10E6/UL (ref 3.8–5.2)
WBC # BLD AUTO: 12.8 10E3/UL (ref 4–11)

## 2021-10-04 PROCEDURE — 87653 STREP B DNA AMP PROBE: CPT | Performed by: OBSTETRICS & GYNECOLOGY

## 2021-10-04 PROCEDURE — 99207 PR PRENATAL VISIT: CPT | Performed by: OBSTETRICS & GYNECOLOGY

## 2021-10-04 PROCEDURE — 85027 COMPLETE CBC AUTOMATED: CPT | Performed by: OBSTETRICS & GYNECOLOGY

## 2021-10-04 PROCEDURE — 59025 FETAL NON-STRESS TEST: CPT | Performed by: OBSTETRICS & GYNECOLOGY

## 2021-10-04 PROCEDURE — 36415 COLL VENOUS BLD VENIPUNCTURE: CPT | Performed by: OBSTETRICS & GYNECOLOGY

## 2021-10-04 NOTE — PROGRESS NOTES
36 year old  at 36w2d weeks presents to the clinic for a routine prenatal visit.  No concerns.  No vaginal bleeding, leakage of fluid, or contractions  Fundal height=35cm  Consider growth ultrasound if unchanged next week  ETLb=513's  CX=Ft/Th/-3  Vertex by BSUS  GBS done today  Thrombocytopenia=will check plts today  Labor precautions discussed  RTC 1 week      Fetal bradycardia on ckbnzlz=990-016  NST IS:  Reactive (2 accl > 15 BPM in 20 min., each lasting approx. 15 seconds)  NST Baseline Rate 120's  Variability:  Average  Accelerations:Present  Variable Decelerations:No  Other Decelerations:No  Contractions: None    Sherly De Dios DO

## 2021-10-05 LAB
GP B STREP DNA SPEC QL NAA+PROBE: NEGATIVE
PATIENT PENICILLIN, AMOXICILLIN, CEPHALOSPORINS ALLERGY: YES

## 2021-10-10 ENCOUNTER — HEALTH MAINTENANCE LETTER (OUTPATIENT)
Age: 36
End: 2021-10-10

## 2021-10-11 ENCOUNTER — PRENATAL OFFICE VISIT (OUTPATIENT)
Dept: OBGYN | Facility: OTHER | Age: 36
End: 2021-10-11
Payer: COMMERCIAL

## 2021-10-11 VITALS — WEIGHT: 184 LBS | DIASTOLIC BLOOD PRESSURE: 84 MMHG | BODY MASS INDEX: 28.82 KG/M2 | SYSTOLIC BLOOD PRESSURE: 122 MMHG

## 2021-10-11 DIAGNOSIS — D69.6 THROMBOCYTOPENIA (H): ICD-10-CM

## 2021-10-11 DIAGNOSIS — O09.523 MULTIGRAVIDA OF ADVANCED MATERNAL AGE IN THIRD TRIMESTER: Primary | ICD-10-CM

## 2021-10-11 DIAGNOSIS — Z98.890 H/O LEEP: ICD-10-CM

## 2021-10-11 PROCEDURE — 99207 PR PRENATAL VISIT: CPT | Performed by: OBSTETRICS & GYNECOLOGY

## 2021-10-11 NOTE — PROGRESS NOTES
36 year old  at 37w2d weeks presents to the clinic for a routine prenatal visit.  No concerns.  Complains of feeling more pressure.  No vaginal bleeding, leakage of fluid, or contractions   Fundal height=36cm  LHIh=885  CX=Ft/50/-3  Discussed labor precautions  RTC 1 week  Thrombocytopenia=plts=10/4=127  GBS=Negative    Sherly De Dios DO

## 2021-10-11 NOTE — PATIENT INSTRUCTIONS
If you have labs or imaging done, the results will automatically release in [employer] without an interpretation.  Your health care professional will review those results and send an interpretation with recommendations as soon as possible, but this may be 1-3 business days.    If you have any questions regarding your visit, please contact your care team.     [employer] [facility]: [phone]  [facility] CLINIC HOURS TELEPHONE NUMBER       Sherly De DiosDO Brannon - PAIGE Estevez-NIGEL Suazo-NIGEL Moser-NIGEL Horne-  Maria Teresa-     Monday- [place]  8:00 a.m - 5:00 p.m    Tuesday- [place]  8:00 a.m - 5:00 p.m    Wednesday- OFF    Thursday- Surgery     Friday- [place]  8:00 a.m - 5:00 p.m. [facility]  [address]  [place] [address]  [phone] Fax  Imaging [phone]    [facility]  [address] [place] [address] [place] [address]  [phone]    [facility]  [address] [place] [address]  [phone] Fax  Imaging [phone]     Urgent Care locations:    [facility] Monday-Friday  10 am - 8 pm  Saturday and Sunday   9 am - 5 pm  Monday-Friday   10 am - 8 pm  Saturday and Sunday   9 am - 5 pm   [phone]     If you need a medication refill, please contact your pharmacy. Please allow 3 business days for your refill to be completed.    As always, thank you for trusting us with your healthcare needs!

## 2021-10-19 ENCOUNTER — PRENATAL OFFICE VISIT (OUTPATIENT)
Dept: OBGYN | Facility: CLINIC | Age: 36
End: 2021-10-19
Payer: COMMERCIAL

## 2021-10-19 VITALS
DIASTOLIC BLOOD PRESSURE: 66 MMHG | HEART RATE: 58 BPM | BODY MASS INDEX: 28.85 KG/M2 | SYSTOLIC BLOOD PRESSURE: 106 MMHG | WEIGHT: 184.2 LBS

## 2021-10-19 DIAGNOSIS — O09.523 MULTIGRAVIDA OF ADVANCED MATERNAL AGE IN THIRD TRIMESTER: Primary | ICD-10-CM

## 2021-10-19 PROCEDURE — 99207 PR PRENATAL VISIT: CPT | Performed by: OBSTETRICS & GYNECOLOGY

## 2021-10-19 NOTE — PROGRESS NOTES
38w3d  No HA, visual changes, N/V etc. Good fetal movement. Cervix is unchanged.. Labor plan and warning s/s discussed. RTC 1 wk/prn.      ICD-10-CM    1. Multigravida of advanced maternal age in third trimester  O09.523      CEPHAS AGBEH, MD.

## 2021-10-19 NOTE — PATIENT INSTRUCTIONS
If you have any questions regarding your visit, Please contact your care team.  [employer] [facility]: [phone]  [facility] CLINIC HOURS TELEPHONE NUMBER   Cephas Agbeh, M.D. Becky-RN Kylie-RN Heidi-RN Deanna-MA Angela-  Maria Teresa-         Monday-[place]    8:00a.m-4:45 p.m    Tuesday--[place]     8:00a.m-4:45 p.m.    Thursday-[place]    8:00a.m-4:45 p.m.    Friday-[place]    8:00a.m-4:45 p.m    [facility]   [address]   [place] [address]   [phone]   Fax [phone]   [phone]     [facility]   [address] [place] [address] [place] [address]   [phone]    [facility]  [address] [facility] [address]  [phone]  [phone]   Urgent Care locations:    [facility] Monday-Friday  10 am - 8 pm  Saturday and Sunday   9 am - 5 pm   Monday-Friday   10 am- 8 pm  Saturday and Sunday  9 am - 5 pm    [phone]   If you need a medication refill, please contact your pharmacy. Please allow 3 business days for your refill to be completed.  As always, Thank you for trusting us with your healthcare needs!

## 2021-10-26 ENCOUNTER — MYC MEDICAL ADVICE (OUTPATIENT)
Dept: OBGYN | Facility: CLINIC | Age: 36
End: 2021-10-26

## 2021-10-26 ENCOUNTER — PRENATAL OFFICE VISIT (OUTPATIENT)
Dept: OBGYN | Facility: CLINIC | Age: 36
End: 2021-10-26
Payer: COMMERCIAL

## 2021-10-26 ENCOUNTER — TRANSFERRED RECORDS (OUTPATIENT)
Dept: HEALTH INFORMATION MANAGEMENT | Facility: CLINIC | Age: 36
End: 2021-10-26

## 2021-10-26 ENCOUNTER — TELEPHONE (OUTPATIENT)
Dept: OBGYN | Facility: CLINIC | Age: 36
End: 2021-10-26

## 2021-10-26 VITALS
OXYGEN SATURATION: 99 % | HEART RATE: 67 BPM | WEIGHT: 185 LBS | BODY MASS INDEX: 28.98 KG/M2 | DIASTOLIC BLOOD PRESSURE: 69 MMHG | SYSTOLIC BLOOD PRESSURE: 106 MMHG

## 2021-10-26 DIAGNOSIS — O26.899 RH NEGATIVE STATE IN ANTEPARTUM PERIOD: ICD-10-CM

## 2021-10-26 DIAGNOSIS — D69.6 THROMBOCYTOPENIA (H): ICD-10-CM

## 2021-10-26 DIAGNOSIS — O09.523 MULTIGRAVIDA OF ADVANCED MATERNAL AGE IN THIRD TRIMESTER: Primary | ICD-10-CM

## 2021-10-26 DIAGNOSIS — Z67.91 RH NEGATIVE STATE IN ANTEPARTUM PERIOD: ICD-10-CM

## 2021-10-26 PROCEDURE — 99207 PR PRENATAL VISIT: CPT | Performed by: OBSTETRICS & GYNECOLOGY

## 2021-10-26 NOTE — PROGRESS NOTES
36 year old  at 39w3d weeks presents to the clinic for a routine prenatal visit.  Decreased fetal movement.  Complains of feeling more pressure.  No vaginal bleeding, leakage of fluid, or contractions   Fundal height=38cm  XQRh=232  CX=2-3/50/-2  Discussed labor precautions  GBS=Negative    Due to decreased fetal movement, I recommend patient go to L&D for prolonged monitoring and a BPP.  I called and spoke to charge RN, Yaneli.  All questions answered and patient verbalizes understanding.    Sherly De Dios, DO

## 2021-10-26 NOTE — TELEPHONE ENCOUNTER
Discussed with Ethan the risks, benefits and alternatives to induction.  We discussed cervical ripening for those patients with a mckeon score of less than 6 (for multiparous) and 8 (for nulliparous).  Discussed the concerns related to uterine hyperstimulation and adverse fetal effects that can occur with a ripening agent or pitocin. Discussed amniotomy and the rationale for it with induction.  I discussed the expected timeline for her based on her presentation, but she understands that this is just an approximate.  She is given the opportunity to ask questions and have them answered.  She does wish to proceed    Induction scheduled for October 27.    Sherly De Dios, DO

## 2021-11-03 ENCOUNTER — MYC MEDICAL ADVICE (OUTPATIENT)
Dept: OBGYN | Facility: CLINIC | Age: 36
End: 2021-11-03

## 2021-11-03 NOTE — TELEPHONE ENCOUNTER
Pt had a vaginal delivery on 10/27.    Pt states she was d/c'd from the hospital on 10/29.  She noticed some lower extremity swelling over the weekend which has since improved.    Monday, 11/1, she felt a sharp pain in her right lower quadrant of abdomen as she was laying her baby in her crib.  She took some Tylenol and sat down and the pain went away within an hour.  Pt states this sharp pain occurred again this morning as she was getting up from a chair.  Again she took Ibuprofen and it eventually went away.    Denies fever, heavy vaginal bleeding, vaginal itching, burning or a foul smelling discharge, pain with urination or constipation.  Pt states she has had a couple BMs since delivery but feels she still may be backed up.    I suggested it could be her uterus sallie to decrease back to normal size, or from constiptation if she still has some stool in her or her body/joints/ligements all healing after pregnancy and delivery.  I assured her it was good that the pain improved with change of position and Tylenol/Ibuprofen.  I suggested to continue to monitor, increase fluid intake and make sure she is walking around some.  If the pain comes back and is severe and does not improve with OTC pain meds, position change or rest after an hour then should be seen in the ED to rule out appendicitis or ovarian torsion.    Pt verbalized understanding and agreed to plan.    Lakeisha Mcneal RN

## 2021-11-29 ENCOUNTER — MEDICAL CORRESPONDENCE (OUTPATIENT)
Dept: HEALTH INFORMATION MANAGEMENT | Facility: CLINIC | Age: 36
End: 2021-11-29
Payer: COMMERCIAL

## 2021-12-17 ENCOUNTER — VIRTUAL VISIT (OUTPATIENT)
Dept: OBGYN | Facility: OTHER | Age: 36
End: 2021-12-17
Payer: COMMERCIAL

## 2021-12-17 DIAGNOSIS — Z83.42 FAMILY HISTORY OF HIGH CHOLESTEROL: ICD-10-CM

## 2021-12-17 DIAGNOSIS — D69.6 THROMBOCYTOPENIA (H): ICD-10-CM

## 2021-12-17 PROBLEM — O26.899 RH NEGATIVE STATE IN ANTEPARTUM PERIOD: Status: RESOLVED | Noted: 2018-12-31 | Resolved: 2021-12-17

## 2021-12-17 PROBLEM — Z67.91 RH NEGATIVE STATE IN ANTEPARTUM PERIOD: Status: RESOLVED | Noted: 2018-12-31 | Resolved: 2021-12-17

## 2021-12-17 PROBLEM — O09.523 MULTIGRAVIDA OF ADVANCED MATERNAL AGE IN THIRD TRIMESTER: Status: RESOLVED | Noted: 2021-03-23 | Resolved: 2021-12-17

## 2021-12-17 PROBLEM — Z23 NEED FOR TDAP VACCINATION: Status: RESOLVED | Noted: 2018-12-31 | Resolved: 2021-12-17

## 2021-12-17 PROCEDURE — 99207 PR POST PARTUM EXAM: CPT | Performed by: OBSTETRICS & GYNECOLOGY

## 2021-12-17 ASSESSMENT — PATIENT HEALTH QUESTIONNAIRE - PHQ9: SUM OF ALL RESPONSES TO PHQ QUESTIONS 1-9: 1

## 2021-12-17 NOTE — PATIENT INSTRUCTIONS
If you have labs or imaging done, the results will automatically release in "[employer] without an interpretation.  Your health care professional will review those results and send an interpretation with recommendations as soon as possible, but this may be 1-3 business days.    If you have any questions regarding your visit, please contact your care team.     [facility]: [phone]  [facility] CLINIC HOURS TELEPHONE NUMBER       Sherly De DiosDO Brannon - PAIGE Estevez-NIGEL Suazo-NIGEL Moser-NIGEL Horne-  Maria Teresa-     Monday- [place]  8:00 a.m - 5:00 p.m    Tuesday- [place]  8:00 a.m - 5:00 p.m    Wednesday- OFF    Thursday- Surgery     Friday- [place]  8:00 a.m - 5:00 p.m. [facility]  [address]  [place] [address]  [phone] Fax  Imaging [phone]    [facility]  [address] [place] [address] [address].  [place], [place] [address] [phone]    [facility]  [address] [place], [place] [address] [phone] Fax  Imaging [phone]     Urgent Care locations:    [facility] Monday-Friday  10 am - 8 pm  Saturday and Sunday   9 am - 5 pm  Monday-Friday   10 am - 8 pm  Saturday and Sunday   9 am - 5 pm   [phone]     If you need a medication refill, please contact your pharmacy. Please allow 3 business days for your refill to be completed.    As always, thank you for trusting us with your healthcare needs!

## 2021-12-17 NOTE — PROGRESS NOTES
Ethan is a 36 year old who is being evaluated via a billable telephone visit.      What phone number would you like to be contacted at? [phone]  How would you like to obtain your AVS? [email or web address]

## 2021-12-17 NOTE — PROGRESS NOTES
Subjective  36 year old non-pregnant female presents today for a postpartum visit.  Patient had a  on 10/27/2021.  No pain.  No vaginal bleeding.  No problems urinating.  Normal bowel movements.  Patient is breast feeding.  No signs and symptoms of ppd.  Patient scored a 1 on the PHQ-9.  No thoughts of suicide or infanticide.  Patient is not due for a pap smear today.  Patient is wanting to do family planning for birth control.  She has a strong family history of high cholesterol so will order that for her today.       ROS: 10 point ROS neg other than the symptoms noted above in the HPI.  History reviewed. No pertinent past medical history.  Past Surgical History:   Procedure Laterality Date     LAPAROSCOPIC CHOLECYSTECTOMY N/A 2019    Procedure: LAPAROSCOPIC CHOLECYSTECTOMY;  Surgeon: Davey Rinaldi MD;  Location: [facility] OR     LEEP TX, CERVICAL       Family History   Problem Relation Age of Onset     Myocardial Infarction Maternal Grandfather      Other - See Comments Maternal Grandfather         stroke     Other - See Comments Maternal Uncle         stroke     Social History     Tobacco Use     Smoking status: Former Smoker     Quit date: 2014     Years since quittin.0     Smokeless tobacco: Never Used   Substance Use Topics     Alcohol use: Not Currently         Objective  Vitals: LMP 2021   Breastfeeding Unknown   BMI= There is no height or weight on file to calculate BMI.         Assessment  1.)  Post partum visit  2.)  S/p  on 10/27/2021  3.)  Birth control   4.)  History of thrombocytopenia  5.)  Family history of high cholesterol       Plan  1.)  Lipids ordered  2.)  CBC ordered  3.)  Follow-up as needed       Sherly De Dios

## 2021-12-30 ENCOUNTER — LAB (OUTPATIENT)
Dept: LAB | Facility: OTHER | Age: 36
End: 2021-12-30
Payer: COMMERCIAL

## 2021-12-30 DIAGNOSIS — Z83.42 FAMILY HISTORY OF HIGH CHOLESTEROL: ICD-10-CM

## 2021-12-30 DIAGNOSIS — D69.6 THROMBOCYTOPENIA (H): ICD-10-CM

## 2021-12-30 LAB
CHOLEST SERPL-MCNC: 232 MG/DL
ERYTHROCYTE [DISTWIDTH] IN BLOOD BY AUTOMATED COUNT: 12.8 % (ref 10–15)
FASTING STATUS PATIENT QL REPORTED: YES
HCT VFR BLD AUTO: 41.1 % (ref 35–47)
HDLC SERPL-MCNC: 67 MG/DL
HGB BLD-MCNC: 13.3 G/DL (ref 11.7–15.7)
LDLC SERPL CALC-MCNC: 151 MG/DL
MCH RBC QN AUTO: 30.5 PG (ref 26.5–33)
MCHC RBC AUTO-ENTMCNC: 32.4 G/DL (ref 31.5–36.5)
MCV RBC AUTO: 94 FL (ref 78–100)
NONHDLC SERPL-MCNC: 165 MG/DL
PLATELET # BLD AUTO: 203 10E3/UL (ref 150–450)
RBC # BLD AUTO: 4.36 10E6/UL (ref 3.8–5.2)
TRIGL SERPL-MCNC: 72 MG/DL
WBC # BLD AUTO: 6.7 10E3/UL (ref 4–11)

## 2021-12-30 PROCEDURE — 80061 LIPID PANEL: CPT

## 2021-12-30 PROCEDURE — 85027 COMPLETE CBC AUTOMATED: CPT

## 2021-12-30 PROCEDURE — 36415 COLL VENOUS BLD VENIPUNCTURE: CPT

## 2022-01-03 ENCOUNTER — MEDICAL CORRESPONDENCE (OUTPATIENT)
Dept: HEALTH INFORMATION MANAGEMENT | Facility: CLINIC | Age: 37
End: 2022-01-03
Payer: COMMERCIAL

## 2022-03-03 ENCOUNTER — MEDICAL CORRESPONDENCE (OUTPATIENT)
Dept: HEALTH INFORMATION MANAGEMENT | Facility: CLINIC | Age: 37
End: 2022-03-03
Payer: COMMERCIAL

## 2022-03-09 ENCOUNTER — PATIENT OUTREACH (OUTPATIENT)
Dept: OBGYN | Facility: CLINIC | Age: 37
End: 2022-03-09
Payer: COMMERCIAL

## 2022-03-09 DIAGNOSIS — Z98.890 H/O LEEP: ICD-10-CM

## 2022-05-06 NOTE — TELEPHONE ENCOUNTER
FYI to provider - Patient is lost to pap tracking follow-up. Attempts to contact pt have been made per reminder process and there has been no reply and/or no appt scheduled. Contact hx listed below.     LEEP- unknown date or results.   10/22/18 NIL pap, Neg HPV (Found in abstracted records)  3/23/21 NIL pap, Neg HPV. Plan cotest in 1 year due bef 3/23/22.  3/31/21 Result sent to pt via [employer].   3/9/22 Reminder mychart  4/7/22 Reminder call - lm  5/6/22 Lost to follow up

## 2022-05-31 ENCOUNTER — MEDICAL CORRESPONDENCE (OUTPATIENT)
Dept: HEALTH INFORMATION MANAGEMENT | Facility: CLINIC | Age: 37
End: 2022-05-31
Payer: COMMERCIAL

## 2022-09-18 ENCOUNTER — HEALTH MAINTENANCE LETTER (OUTPATIENT)
Age: 37
End: 2022-09-18

## 2022-09-26 ENCOUNTER — VIRTUAL VISIT (OUTPATIENT)
Dept: FAMILY MEDICINE | Facility: CLINIC | Age: 37
End: 2022-09-26
Payer: COMMERCIAL

## 2022-09-26 ENCOUNTER — PRENATAL OFFICE VISIT (OUTPATIENT)
Dept: OBGYN | Facility: CLINIC | Age: 37
End: 2022-09-26

## 2022-09-26 VITALS — HEIGHT: 66 IN | BODY MASS INDEX: 25.39 KG/M2 | WEIGHT: 158 LBS

## 2022-09-26 DIAGNOSIS — Z53.9 ERRONEOUS ENCOUNTER--DISREGARD: Primary | ICD-10-CM

## 2022-09-26 DIAGNOSIS — Z34.90 SUPERVISION OF NORMAL PREGNANCY: Primary | ICD-10-CM

## 2022-09-26 PROCEDURE — 99207 PR NO CHARGE NURSE ONLY: CPT

## 2022-09-26 NOTE — PROGRESS NOTES
Pauline Mittal, RN  [facility] Triage 3 minutes ago (12:19 PM)     GA    Ethan wants to see Dr. De Dios and deliver in [place].  Will you please call her for OB Intake visit?    Routing comment      RN called pt and scheduled her an appt for today.    Patient verbalized understanding and agreed to plan.     Zenaida Betancur RN on 9/26/2022 at 12:23 PM

## 2022-09-26 NOTE — PROGRESS NOTES
Wants to see Dr. De Dios and deliver in [place].  Message sent for patient to be scheduled for [facility] Intake.

## 2022-10-09 ENCOUNTER — NURSE TRIAGE (OUTPATIENT)
Dept: NURSING | Facility: CLINIC | Age: 37
End: 2022-10-09

## 2022-10-09 NOTE — TELEPHONE ENCOUNTER
Nurse Triage SBAR    Is this a 2nd Level Triage? NO    Situation:  8 weeks pregnant. Woke up with brown discharge when she wiped only x 1.  LMP was 08/14/2022. Has not been seen for this pregnancy yet.    Background: Patient, manuel Long. Consent: not needed.     Assessment:  Mild cramping yesterday No cramping now or today so far. No recent intercourse, vaginal exam. Denies vaginal odor, vaginal itching, burning, or pain. Denies dysuria.     Protocol Recommended Disposition: Home care/ Telephone Advise    Recommendation: According to the protocol, Patient should do home care. Home care reviewed. Advised Patient to do home care. Home care reviewed.Care advice given. Patient verbalizes understanding and agrees with plan of care. Reviewed concerning symptoms and when to call back and patient verbalized understanding and agreed to follow care advice given.       Danelle Maharaj RN  [facility] Nurse Advisor  10/9/2022 at 7:48 AM         Does the patient meet one of the following criteria for ADS visit consideration? 16+ years old, with an [facility] PCP      TIP  Providers, please consider if this condition is appropriate for management at one of our Acute and Diagnostic Services sites.      If patient is a good candidate, please use dotphrase <dot>triageresponse and select Refer to ADS to document.      Reason for Disposition    SPOTTING (single or brief episode)    Additional Information    Negative: Shock suspected (e.g., cold/pale/clammy skin, too weak to stand, low BP, rapid pulse)    Negative: Difficult to awaken or acting confused (e.g., disoriented, slurred speech)    Negative: Passed out (i.e., lost consciousness, collapsed and was not responding)    Negative: Sounds like a life-threatening emergency to the triager    Negative: [1] Vaginal bleeding AND [2] pregnant 20 or more weeks    Negative: Not pregnant or pregnancy status unknown    Negative: SEVERE abdominal pain    Negative: [1] SEVERE vaginal  "bleeding (e.g., soaking 2 pads / hour, large blood clots) AND [2] present 2 or more hours    Negative: SEVERE dizziness (e.g., unable to stand, requires support to walk, feels like passing out)    Negative: [1] MODERATE vaginal bleeding (e.g., soaking 1 pad per hour; clots) AND [2] present > 6 hours    Negative: [1] MODERATE vaginal bleeding (e.g., soaking 1 pad per hour; clots) AND [2] pregnant > 12 weeks    Negative: Passed tissue (e.g., gray-white)    Negative: Shoulder pain    Negative: Pale skin (pallor) of new-onset or worsening    Negative: Patient sounds very sick or weak to the triager    Negative: [1] Constant abdominal pain AND [2] present > 2 hours    Negative: Fever > 100.4 F (38.0 C)    Negative: [1] Intermittent lower abdominal pain (e.g., cramping) AND [2] present > 24 hours    Negative: Prior history of \"ectopic pregnancy\" or previous tubal surgery (e.g., tubal ligation)    Negative: Pain or burning with passing urine (urination)    Negative: MODERATE vaginal bleeding (e.g., soaking 1 pad per hour; clots)    Negative: Has IUD    Negative: MILD vaginal bleeding (i.e., less than 1 pad / hour; less than patient's usual menstrual bleeding; not just spotting)    Negative: SPOTTING lasts > 48 hours or spotting happens more than once in a week    Negative: Unusual vaginal discharge (e.g., bad smelling, yellow, green, or foamy-white)    Negative: Not feeling pregnant any longer (e.g., breast tenderness or nausea has disappeared)    Negative: SPOTTING after sexual intercourse (single or brief episode)    Negative: SPOTTING after a pelvic examination (single or brief episode)    Protocols used: PREGNANCY - VAGINAL BLEEDING LESS THAN 20 WEEKS EGA-A-AH      "

## 2022-10-10 ENCOUNTER — TELEPHONE (OUTPATIENT)
Dept: OBGYN | Facility: CLINIC | Age: 37
End: 2022-10-10

## 2022-10-10 DIAGNOSIS — O20.9 BLEEDING IN EARLY PREGNANCY: Primary | ICD-10-CM

## 2022-10-10 NOTE — TELEPHONE ENCOUNTER
Please let patient know I placed the order for a quant level.  I would like her to repeat this in 48 hours.  I will let her know the results after I have that second result.  Please give her bleeding precautions again.    Sherly De Dios, DO

## 2022-10-10 NOTE — TELEPHONE ENCOUNTER
", 8w1d.    New prenatal with Dr. De Dios on .    Noticed brown spotting yesterday, 10/9.  Later in the afternoon it turned more red blood.  Now passing \"jelly like blood clots\".  Dime sized.  Only notices when she wipes.    Some abdominal camping.    Lifted a heavy dresser the other day.    Denies recent intercourse. Denies vaginal or urinary symptoms.    Gave ER bleeding precautions.    Routing to Dr. De Dios to place serial quant orders if appropriate.    Lakeisha Mcneal RN  "

## 2022-10-10 NOTE — TELEPHONE ENCOUNTER
Scheduled pt for a lab only appt tomorrow, 10/11, and 10/13.  Gave bleeding precautions.    Lakeisha Mcneal RN

## 2022-10-11 ENCOUNTER — LAB (OUTPATIENT)
Dept: LAB | Facility: OTHER | Age: 37
End: 2022-10-11
Payer: COMMERCIAL

## 2022-10-11 DIAGNOSIS — O20.9 BLEEDING IN EARLY PREGNANCY: ICD-10-CM

## 2022-10-11 LAB — B-HCG SERPL-ACNC: 2171 IU/L (ref 0–5)

## 2022-10-11 PROCEDURE — 84702 CHORIONIC GONADOTROPIN TEST: CPT

## 2022-10-11 PROCEDURE — 36415 COLL VENOUS BLD VENIPUNCTURE: CPT

## 2022-10-13 ENCOUNTER — LAB (OUTPATIENT)
Dept: LAB | Facility: CLINIC | Age: 37
End: 2022-10-13
Payer: COMMERCIAL

## 2022-10-13 DIAGNOSIS — O20.9 BLEEDING IN EARLY PREGNANCY: ICD-10-CM

## 2022-10-13 LAB — B-HCG SERPL-ACNC: 397 IU/L (ref 0–5)

## 2022-10-13 PROCEDURE — 84702 CHORIONIC GONADOTROPIN TEST: CPT

## 2022-10-13 PROCEDURE — 36415 COLL VENOUS BLD VENIPUNCTURE: CPT

## 2022-10-14 ENCOUNTER — ALLIED HEALTH/NURSE VISIT (OUTPATIENT)
Dept: FAMILY MEDICINE | Facility: CLINIC | Age: 37
End: 2022-10-14
Payer: COMMERCIAL

## 2022-10-14 ENCOUNTER — MYC MEDICAL ADVICE (OUTPATIENT)
Dept: OBGYN | Facility: OTHER | Age: 37
End: 2022-10-14

## 2022-10-14 DIAGNOSIS — O20.9 BLEEDING IN EARLY PREGNANCY: Primary | ICD-10-CM

## 2022-10-14 PROCEDURE — 99207 PR NO CHARGE LOS: CPT

## 2022-10-14 PROCEDURE — 96372 THER/PROPH/DIAG INJ SC/IM: CPT | Performed by: FAMILY MEDICINE

## 2022-10-14 NOTE — PROGRESS NOTES
Clinic Administered Medication Documentation    Administrations This Visit     rho(D) immune globulin (RHOGAM) injection 300 mcg     Admin Date  10/14/2022 Action  Given Dose  300 mcg Route  Intramuscular Site  Right Ventrogluteal Administered By  Chelsey Quan CMA    Ordering Provider: Sherly De Dios, DO    Patient Supplied?: No                  Injectable Medication Documentation    Patient was given Rhogam. Prior to medication administration, verified patients identity using patient s name and date of birth. Please see MAR and medication order for additional information. Patient instructed to stay in clinic after the injection but patient declined.      Was entire vial of medication used? Yes  Vial/Syringe: Syringe  Expiration Date:  10/29/2023    Was this medication supplied by the patient? No     Nicole Quan CMA 10/14/2022 3:28 PM

## 2022-10-14 NOTE — TELEPHONE ENCOUNTER
Talked to Dr. De Dios, she would like pt to have rhogam. Ordered, pt will go to  to get this done today before 3:30 today.    Eveline Tolliver RN on 10/14/2022 at 2:52 PM

## 2022-10-20 ENCOUNTER — LAB (OUTPATIENT)
Dept: LAB | Facility: CLINIC | Age: 37
End: 2022-10-20
Payer: COMMERCIAL

## 2022-10-20 DIAGNOSIS — O20.9 BLEEDING IN EARLY PREGNANCY: ICD-10-CM

## 2022-10-20 LAB — B-HCG SERPL-ACNC: 13 IU/L (ref 0–5)

## 2022-10-20 PROCEDURE — 84702 CHORIONIC GONADOTROPIN TEST: CPT

## 2022-10-20 PROCEDURE — 36415 COLL VENOUS BLD VENIPUNCTURE: CPT

## 2022-10-27 ENCOUNTER — TELEPHONE (OUTPATIENT)
Dept: OBGYN | Facility: CLINIC | Age: 37
End: 2022-10-27

## 2022-10-27 ENCOUNTER — LAB (OUTPATIENT)
Dept: LAB | Facility: CLINIC | Age: 37
End: 2022-10-27
Payer: COMMERCIAL

## 2022-10-27 DIAGNOSIS — O20.9 BLEEDING IN EARLY PREGNANCY: Primary | ICD-10-CM

## 2022-10-27 LAB
B-HCG SERPL-ACNC: 2 IU/L (ref 0–5)
HOLD SPECIMEN: NORMAL

## 2022-10-27 PROCEDURE — 36415 COLL VENOUS BLD VENIPUNCTURE: CPT

## 2022-10-27 PROCEDURE — 84702 CHORIONIC GONADOTROPIN TEST: CPT

## 2022-10-27 NOTE — TELEPHONE ENCOUNTER
"[facility] calling - looking for lab orders, as patient was seen this morning.     Per result note: \"The results of your lab work show your pregnancy hormone level has dropped appropriately.  You should repeat the lab work next week.     ~Sherly De Dios, \"    Signed order with provider cleopatra. Lab did draw an extra tube so this can be added on.  Salud Braga, [facility]    "

## 2022-10-28 ENCOUNTER — MYC MEDICAL ADVICE (OUTPATIENT)
Dept: OBGYN | Facility: OTHER | Age: 37
End: 2022-10-28

## 2022-11-16 ENCOUNTER — TELEPHONE (OUTPATIENT)
Dept: OBGYN | Facility: CLINIC | Age: 37
End: 2022-11-16

## 2022-11-16 DIAGNOSIS — O09.299 HISTORY OF MISCARRIAGE, CURRENTLY PREGNANT: Primary | ICD-10-CM

## 2022-11-16 NOTE — TELEPHONE ENCOUNTER
with recent miscarriage in Oct (GP status not updated), have been watching her HCG's up through 10/27, HCG 10/27 was 13    Pt called in. Unsure if she is pregnant. She tested positive for pregnancy at home. Has been trying. She is experiencing some light spotting and cramping today. Blood type A-, concerns for needing additional rhogam if having another miscarriage.    Pt wondering what follow up is recommended for her.    Eveline Tolliver RN on 2022 at 11:41 AM

## 2022-11-16 NOTE — TELEPHONE ENCOUNTER
Unable to reach patient via phone. RN left a message and instructed patient to call the clinic at [phone].    Eveline Tolliver RN on 11/16/2022 at 3:30 PM

## 2022-11-16 NOTE — TELEPHONE ENCOUNTER
Please let patient know she should have serial quants done.  I signed the order.  When the level is high enough, she can get an ultrasound.  I do not think she needs Rhogam at this time if the spotting is only very light.  She should let me know if she develops heavy vaginal bleeding.  I will let her know the results when I have them.  Thanks.    Sherly De Dios, DO

## 2022-11-16 NOTE — TELEPHONE ENCOUNTER
Reviewed provider recommendations with pt and scheduled her for HCG lab appointments. Pt verbalized understanding.    Eveline Tolliver RN on 11/16/2022 at 3:43 PM

## 2022-11-17 ENCOUNTER — LAB (OUTPATIENT)
Dept: LAB | Facility: CLINIC | Age: 37
End: 2022-11-17
Payer: COMMERCIAL

## 2022-11-17 DIAGNOSIS — O09.299 HISTORY OF MISCARRIAGE, CURRENTLY PREGNANT: ICD-10-CM

## 2022-11-17 LAB — B-HCG SERPL-ACNC: 3 IU/L (ref 0–5)

## 2022-11-17 PROCEDURE — 84702 CHORIONIC GONADOTROPIN TEST: CPT

## 2022-11-17 PROCEDURE — 36415 COLL VENOUS BLD VENIPUNCTURE: CPT

## 2022-11-17 NOTE — TELEPHONE ENCOUNTER
Pt called in today stating she started bleeding last night.  She is having to change a pad every couple of hours.  She was asking if she needs to have a Rhogam injection.    Pt is scheduled for a lab only appt today at 11 am for an HCG quant to see if pt is truly pregnant since she had a miscarriage less than a month ago.  I suggested she keep her lab appt today and once Dr. De Dios reviews and interprets the results we will call her with the results and if there is any further f/u needed.    Pt verbalized understanding and agreed to plan.    Lakeisha Mcneal RN

## 2023-01-28 ENCOUNTER — HEALTH MAINTENANCE LETTER (OUTPATIENT)
Age: 38
End: 2023-01-28

## 2023-02-19 ENCOUNTER — APPOINTMENT (OUTPATIENT)
Dept: ULTRASOUND IMAGING | Facility: CLINIC | Age: 38
End: 2023-02-19
Attending: FAMILY MEDICINE
Payer: COMMERCIAL

## 2023-02-19 ENCOUNTER — HOSPITAL ENCOUNTER (EMERGENCY)
Facility: CLINIC | Age: 38
Discharge: HOME | End: 2023-02-19
Attending: FAMILY MEDICINE | Admitting: FAMILY MEDICINE
Payer: COMMERCIAL

## 2023-02-19 VITALS
HEART RATE: 64 BPM | BODY MASS INDEX: 25.71 KG/M2 | DIASTOLIC BLOOD PRESSURE: 72 MMHG | HEIGHT: 66 IN | TEMPERATURE: 97.8 F | SYSTOLIC BLOOD PRESSURE: 116 MMHG | RESPIRATION RATE: 16 BRPM | OXYGEN SATURATION: 99 % | WEIGHT: 160 LBS

## 2023-02-19 DIAGNOSIS — M79.89 LEFT LEG SWELLING: ICD-10-CM

## 2023-02-19 PROCEDURE — 99284 EMERGENCY DEPT VISIT MOD MDM: CPT | Mod: 25 | Performed by: FAMILY MEDICINE

## 2023-02-19 PROCEDURE — 99283 EMERGENCY DEPT VISIT LOW MDM: CPT | Performed by: FAMILY MEDICINE

## 2023-02-19 PROCEDURE — 93971 EXTREMITY STUDY: CPT | Mod: LT

## 2023-02-19 ASSESSMENT — ACTIVITIES OF DAILY LIVING (ADL)
ADLS_ACUITY_SCORE: 35
ADLS_ACUITY_SCORE: 35

## 2023-02-19 NOTE — ED TRIAGE NOTES
Left leg pain and swelling, stats she returned by flight from [place] on Thursday.      Triage Assessment     Row Name 02/19/23 1043       Triage Assessment (Adult)    Airway WDL WDL       Respiratory WDL    Respiratory WDL WDL       Skin Circulation/Temperature WDL    Skin Circulation/Temperature WDL WDL       Cardiac WDL    Cardiac WDL WDL       Peripheral/Neurovascular WDL    Peripheral Neurovascular WDL WDL       Cognitive/Neuro/Behavioral WDL    Cognitive/Neuro/Behavioral WDL WDL

## 2023-02-19 NOTE — ED PROVIDER NOTES
History     Chief Complaint   Patient presents with     Leg Swelling     HPI  Ethan Watt is a 38 year old female who presents with swelling of the left lower leg this been going on for the past 3 days after a trip back from [place].  Patient is also had pain in that area.  Patient states the swelling gets worse as the day goes along and then gets better at bedtime.  Patient denies any chest pain or palpitations or shortness of breath.  Nothing makes his symptoms better or worse.    Allergies:  No Known Allergies    Problem List:    Patient Active Problem List    Diagnosis Date Noted     Thrombocytopenia (H) 03/04/2019     Priority: Medium     Repeat Platelets weekly.  Clinically impression still is gestational thrombocytopenia. No further testing/special management as long as platelets remain above 70k. Per Hematology       Calculus of gallbladder 12/31/2018     Priority: Medium     H/O LEEP 12/31/2018     Priority: Medium     LEEP- unknown date or results.   10/22/18 NIL pap, Neg HPV (Found in abstracted records)  3/23/21 NIL pap, Neg HPV. Plan cotest in 1 year due bef 3/23/22.  3/31/21 Result sent to pt via [employer].   3/9/22 Reminder [employer]  4/7/22 Reminder call - lm  5/6/22 Lost to follow up          Past Medical History:    History reviewed. No pertinent past medical history.    Past Surgical History:    Past Surgical History:   Procedure Laterality Date     LAPAROSCOPIC CHOLECYSTECTOMY N/A 01/16/2019    Procedure: LAPAROSCOPIC CHOLECYSTECTOMY;  Surgeon: Davey Rinaldi MD;  Location:  OR     LEEP TX, CERVICAL         Family History:    Family History   Problem Relation Age of Onset     No Known Problems Mother      Cancer Maternal Grandfather      Coronary Artery Disease Maternal Grandfather      Myocardial Infarction Maternal Grandfather      Other - See Comments Maternal Grandfather         stroke     Other - See Comments Maternal Uncle         stroke       Social History:  Marital Status:   " [2]  Social History     Tobacco Use     Smoking status: Former     Types: Cigarettes     Quit date: 2014     Years since quittin.1     Smokeless tobacco: Never   Vaping Use     Vaping Use: Never used   Substance Use Topics     Alcohol use: Not Currently     Drug use: No        Medications:    Prenatal Vit-Fe Fumarate-FA (PRENATAL MULTIVITAMIN W/IRON) 27-0.8 MG tablet  UNABLE TO FIND          Review of Systems   All other systems reviewed and are negative.      Physical Exam   BP: 127/72  Pulse: 67  Temp: 97.8  F (36.6  C)  Resp: 16  Height: 167.6 cm (5' 6\")  Weight: 72.6 kg (160 lb)  SpO2: 99 %      Physical Exam  Vitals and nursing note reviewed.   Constitutional:       General: She is not in acute distress.     Appearance: Normal appearance. She is not ill-appearing.   Musculoskeletal:      Left lower leg: Swelling and tenderness present.   Skin:     Capillary Refill: Capillary refill takes less than 2 seconds.   Neurological:      Mental Status: She is alert.         ED Course                 Procedures      Results for orders placed or performed during the hospital encounter of 23 (from the past 24 hour(s))   US Lower Extremity Venous Duplex Left    Narrative    EXAM: US LOWER EXTREMITY VENOUS DUPLEX LEFT  LOCATION: [facility]  DATE/TIME: 2023 1:39 PM    INDICATION: Left leg swelling, pain.  COMPARISON: None.  TECHNIQUE: Venous Duplex ultrasound of the left lower extremity with and without compression, augmentation and duplex. Color flow and spectral Doppler with waveform analysis performed.    FINDINGS: Exam includes the common femoral, femoral, popliteal, and contralateral common femoral veins as well as segmentally visualized deep calf veins and greater saphenous vein.     LEFT: No deep vein thrombosis. No superficial thrombophlebitis. No popliteal cyst.      Impression    IMPRESSION:  1.  No deep venous thrombosis in the left lower extremity. "       Medications - No data to display     Ultrasound came back and did not show any signs of a DVT.  The swelling since is more during the day and better at night could be more dependent edema.  Recommend just, watching this if this continues can consider support hose.  Patient will follow up with her doctor if there is no improvement over the next couple weeks.    Assessments & Plan (with Medical Decision Making)  Left leg swelling     I have reviewed the nursing notes.    I have reviewed the findings, diagnosis, plan and need for follow up with the patient.      2/19/2023   [facility] EMERGENCY DEPT     Herberth Gooden MD  02/21/23 5997

## 2023-04-13 ENCOUNTER — OFFICE VISIT (OUTPATIENT)
Dept: OBGYN | Facility: CLINIC | Age: 38
End: 2023-04-13
Payer: COMMERCIAL

## 2023-04-13 VITALS
HEART RATE: 70 BPM | OXYGEN SATURATION: 100 % | WEIGHT: 156.8 LBS | BODY MASS INDEX: 24.61 KG/M2 | HEIGHT: 67 IN | DIASTOLIC BLOOD PRESSURE: 62 MMHG | SYSTOLIC BLOOD PRESSURE: 99 MMHG

## 2023-04-13 DIAGNOSIS — Z01.419 WELL WOMAN EXAM WITH ROUTINE GYNECOLOGICAL EXAM: Primary | ICD-10-CM

## 2023-04-13 DIAGNOSIS — Z12.4 CERVICAL CANCER SCREENING: ICD-10-CM

## 2023-04-13 PROCEDURE — G0145 SCR C/V CYTO,THINLAYER,RESCR: HCPCS | Performed by: OBSTETRICS & GYNECOLOGY

## 2023-04-13 PROCEDURE — 87624 HPV HI-RISK TYP POOLED RSLT: CPT | Performed by: OBSTETRICS & GYNECOLOGY

## 2023-04-13 PROCEDURE — 99395 PREV VISIT EST AGE 18-39: CPT | Performed by: OBSTETRICS & GYNECOLOGY

## 2023-04-13 NOTE — PATIENT INSTRUCTIONS
If you have any questions regarding your visit, Please contact your care team.    [email or web address] [facility]: [phone]      [facility] CLINIC HOURS TELEPHONE NUMBER   Sherrie Gonzalez DO.    JAMIE Kumar-Surgery Scheduler  Maria Teresa - Surgery Scheduler    NIGEL Suazo, NIGEL Estevez RN     Monday, Thursday  [place]  7am-3pm    Tuesday, Wednesday  [place]  7am-3pm    [place] Friday &   [place]    Typical Surgery Days: Thursday or Friday   [facility]  [address] [place] [address] [phone] Phone  [phone] Fax    [address] [address] [phone] Phone    Imaging [phone] Phone    [facility] Labor and Delivery:  [phone] Phone     **Surgeries** Our Surgery Schedulers will contact you to schedule. If you do not receive a call within 3 business days, please call [phone].    Urgent Care locations:  [facility] Saturday and    9 am - 5 pm    Monday-Friday   12 pm - 8 pm  Saturday and    9 am - 5 pm   [phone]       If you need a medication refill, please contact your pharmacy. Please allow 3 business days for your refill to be completed.  As always, Thank you for trusting us with your healthcare needs!

## 2023-04-13 NOTE — PROGRESS NOTES
SUBJECTIVE:       HPI: Ethan Kaufman is a 38 year old  who presents today for [employer].  Considering conceiving in the near future.    Ob Hx:  s/p   OB History    Para Term  AB Living   4 2 2 0 2 2   SAB IAB Ectopic Multiple Live Births   2 0 0 0 2      # Outcome Date GA Lbr Александр/2nd Weight Sex Delivery Anes PTL Lv   4 SAB 10/2022           3 Term 10/27/21 39w4d / 00:13 3.33 kg (7 lb 5.5 oz) F  EPI N ARTUR      Name: ISIDRO KAUFMANGIRL      Apgar1: 8  Apgar5: 8   2 SAB 2020           1 Term 19 38w4d 12:13 / 01:23 3.45 kg (7 lb 9.7 oz) M Vag-Spont EPI N ARTUR      Name: ISIDRO KAUFMANBOISRA      Apgar1: 8  Apgar5: 9         Gyn Hx: Patient's last menstrual period was 2023 (exact date).    Patient is sexually active. One male partner   Last pap was   Lab Results   Component Value Date    PAP, HR HPV Neg NIL 2021     Using NFP for contraception.   STD testing offered?  Declined   Menses every 28 days. reg flow.         reports that she quit smoking about 8 years ago. She has never used smokeless tobacco.      Today's PHQ-2 Score:       2023     9:40 AM   PHQ-2 (  [employer])   Q1: Little interest or pleasure in doing things 0   Q2: Feeling down, depressed or hopeless 0   PHQ-2 Score 0   Q1: Little interest or pleasure in doing things Not at all   Q2: Feeling down, depressed or hopeless Not at all   PHQ-2 Score 0     Today's PHQ-9 Score:       2021     3:04 PM   PHQ-9 SCORE   PHQ-9 Total Score 1     Today's DIANE-7 Score:        View : No data to display.                Problem list and histories reviewed & adjusted, as indicated.  Additional history: as documented.    Patient Active Problem List   Diagnosis     Calculus of gallbladder     H/O LEEP     Thrombocytopenia (H)     Past Surgical History:   Procedure Laterality Date     LAPAROSCOPIC CHOLECYSTECTOMY N/A 2019    Procedure: LAPAROSCOPIC CHOLECYSTECTOMY;  Surgeon: Davey Rinaldi MD;  Location:  OR      "[facility] [place]        Social History     Tobacco Use     Smoking status: Former     Types: Cigarettes     Quit date: 2014     Years since quittin.3     Smokeless tobacco: Never   Vaping Use     Vaping status: Never Used   Substance Use Topics     Alcohol use: Not Currently      Problem (# of Occurrences) Relation (Name,Age of Onset)    Cancer (1) Maternal Grandfather    Myocardial Infarction (1) Maternal Grandfather    Coronary Artery Disease (1) Maternal Grandfather    Other - See Comments (2) Maternal Grandfather: stroke, Maternal Uncle: stroke    No Known Problems (1) Mother            Prenatal Vit-Fe Fumarate-FA (PRENATAL MULTIVITAMIN W/IRON) 27-0.8 MG tablet, Take 1 tablet by mouth daily  UNABLE TO FIND, MEDICATION NAME: Comfort Tone (Patient not taking: Reported on 2023)    No current facility-administered medications on file prior to visit.    No Known Allergies    ROS:  10 Point review of systems negative other noted above in HPI    OBJECTIVE:     BP 99/62 (BP Location: Right arm, Patient Position: Sitting, Cuff Size: Adult Regular)   Pulse 70   Ht 1.69 m (5' 6.54\")   Wt 71.1 kg (156 lb 12.8 oz)   LMP 2023 (Exact Date)   SpO2 100%   BMI 24.90 kg/m    Body mass index is 24.9 kg/m .      Gen: Alert, oriented, appropriately interactive, NAD  Eyes: Eyes grossly normal to inspection, conjunctivae and sclerae normal  CV: No edema  Resp: no audible wheeze, cough, or visible cyanosis.  No visible retractions or increased work of breathing.  Able to speak fully in complete sentences.  Breasts: no axillary adenopathy, no dominant masses, no skin changes, no nipple discharge, nontender  Abdomen: soft, non tender, non distended, no masses, no hernias.   External genitalia: no lesions; normal appearing external genitalia, bartholins glands, urethra, skenes glands  Vagina: no masses or lesions or discharge, normally rugated.  Cervix: no masses or lesions or discharge   Bimanual exam: "   Nontender pelvic floor muscles  Urethra: nontender   Bladder: nontender and without massess, well supported   Uterus: midline, anteverted, small, mobile  no masses, non-tender  Adnexa: no masses or tenderness appreciated   No cervical motion tenderness  MSK: normal gait, symmetric movements UE & LE  Lower extremities: non-tender, no edema  Neuro: Cranial nerves grossly intact, mentation intact and speech normal  Psych: mentation appears normal, affect normal/bright, judgement and insight intact, normal speech and appearance well-groomed      In-Clinic Test Results:  No results found for this or any previous visit (from the past 24 hour(s)).    ASSESSMENT/PLAN:                                                      Ethan Watt is a 38 year old  who presents today for WWE      ICD-10-CM    1. Well woman exam with routine gynecological exam  Z01.419       2. Cervical cancer screening  Z12.4 Pap Screen with HPV - recommended age 30 - 65 years          Pap with cotesting  STI screen- declined  Contraception- NFP, considering conception in the near future. Discussed starting PNV with folic acid now and calling with +UPT. Briefly discussed risks of AMA pregnancy nearing 39 years old next year. All questions answered      Sherrie Gonzalez DO  [facility] [place]

## 2023-04-17 LAB
BKR LAB AP GYN ADEQUACY: NORMAL
BKR LAB AP GYN INTERPRETATION: NORMAL
BKR LAB AP HPV REFLEX: NORMAL
BKR LAB AP PREVIOUS ABNL DX: NORMAL
BKR LAB AP PREVIOUS ABNORMAL: NORMAL
PATH REPORT.COMMENTS IMP SPEC: NORMAL
PATH REPORT.COMMENTS IMP SPEC: NORMAL
PATH REPORT.RELEVANT HX SPEC: NORMAL

## 2023-04-19 LAB
HUMAN PAPILLOMA VIRUS 16 DNA: NEGATIVE
HUMAN PAPILLOMA VIRUS 18 DNA: NEGATIVE
HUMAN PAPILLOMA VIRUS FINAL DIAGNOSIS: NORMAL
HUMAN PAPILLOMA VIRUS OTHER HR: NEGATIVE

## 2023-08-16 ENCOUNTER — VIRTUAL VISIT (OUTPATIENT)
Dept: OBGYN | Facility: CLINIC | Age: 38
End: 2023-08-16
Payer: COMMERCIAL

## 2023-08-16 VITALS — BODY MASS INDEX: 25.31 KG/M2 | HEIGHT: 66 IN

## 2023-08-16 DIAGNOSIS — Z23 NEED FOR TDAP VACCINATION: ICD-10-CM

## 2023-08-16 DIAGNOSIS — Z34.80 PRENATAL CARE, SUBSEQUENT PREGNANCY, UNSPECIFIED TRIMESTER: Primary | ICD-10-CM

## 2023-08-16 PROBLEM — O09.90 HIGH-RISK PREGNANCY: Status: ACTIVE | Noted: 2023-08-16

## 2023-08-16 PROBLEM — D69.6 BENIGN GESTATIONAL THROMBOCYTOPENIA IN THIRD TRIMESTER (H): Status: ACTIVE | Noted: 2019-04-22

## 2023-08-16 PROBLEM — O46.8X9 SUBCHORIONIC HEMATOMA: Status: ACTIVE | Noted: 2018-10-25

## 2023-08-16 PROBLEM — R87.619 ABNORMAL CERVICAL PAPANICOLAOU SMEAR: Status: ACTIVE | Noted: 2018-09-19

## 2023-08-16 PROBLEM — Z34.90 SUPERVISION OF NORMAL PREGNANCY: Status: ACTIVE | Noted: 2019-04-23

## 2023-08-16 PROBLEM — Z87.730 HISTORY OF CLEFT PALATE: Status: ACTIVE | Noted: 2018-09-19

## 2023-08-16 PROBLEM — K59.00 CONSTIPATION: Status: ACTIVE | Noted: 2018-10-10

## 2023-08-16 PROBLEM — Z67.91 RHD NEGATIVE: Status: ACTIVE | Noted: 2018-10-15
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
## 2023-08-16 PROBLEM — O41.8X90 SUBCHORIONIC HEMATOMA: Status: ACTIVE | Noted: 2018-10-25

## 2023-08-16 PROBLEM — O99.113 BENIGN GESTATIONAL THROMBOCYTOPENIA IN THIRD TRIMESTER (H): Status: ACTIVE | Noted: 2019-04-22

## 2023-08-16 PROBLEM — O09.529 AMA (ADVANCED MATERNAL AGE) MULTIGRAVIDA 35+: Status: ACTIVE | Noted: 2023-08-16

## 2023-08-16 PROBLEM — Z98.890 HISTORY OF LOOP ELECTROSURGICAL EXCISION PROCEDURE (LEEP): Status: ACTIVE | Noted: 2023-08-16

## 2023-08-16 PROCEDURE — 99207 PR NO CHARGE NURSE ONLY: CPT

## 2023-08-16 RX ORDER — PREDNISONE 10 MG/1
TABLET ORAL
COMMUNITY
Start: 2023-01-24 | End: 2023-10-02

## 2023-08-16 RX ORDER — TERBINAFINE HYDROCHLORIDE 250 MG/1
1 TABLET ORAL DAILY
COMMUNITY
End: 2023-10-02
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
## 2023-09-04 ENCOUNTER — APPOINTMENT (OUTPATIENT)
Dept: ULTRASOUND IMAGING | Facility: CLINIC | Age: 38
End: 2023-09-04
Attending: EMERGENCY MEDICINE
Payer: COMMERCIAL

## 2023-09-04 ENCOUNTER — NURSE TRIAGE (OUTPATIENT)
Dept: NURSING | Facility: CLINIC | Age: 38
End: 2023-09-04
Payer: COMMERCIAL

## 2023-09-04 ENCOUNTER — APPOINTMENT (OUTPATIENT)
Dept: GENERAL RADIOLOGY | Facility: CLINIC | Age: 38
End: 2023-09-04
Attending: EMERGENCY MEDICINE
Payer: COMMERCIAL

## 2023-09-04 ENCOUNTER — HOSPITAL ENCOUNTER (EMERGENCY)
Facility: CLINIC | Age: 38
Discharge: HOME | End: 2023-09-04
Attending: EMERGENCY MEDICINE | Admitting: EMERGENCY MEDICINE
Payer: COMMERCIAL

## 2023-09-04 VITALS
OXYGEN SATURATION: 100 % | WEIGHT: 153 LBS | RESPIRATION RATE: 18 BRPM | TEMPERATURE: 98 F | DIASTOLIC BLOOD PRESSURE: 72 MMHG | HEIGHT: 66 IN | SYSTOLIC BLOOD PRESSURE: 118 MMHG | HEART RATE: 61 BPM | BODY MASS INDEX: 24.59 KG/M2

## 2023-09-04 DIAGNOSIS — J06.9 VIRAL URI WITH COUGH: ICD-10-CM

## 2023-09-04 LAB
ANION GAP SERPL CALCULATED.3IONS-SCNC: 14 MMOL/L (ref 7–15)
BASOPHILS # BLD AUTO: 0.1 10E3/UL (ref 0–0.2)
BASOPHILS NFR BLD AUTO: 1 %
BUN SERPL-MCNC: 14.4 MG/DL (ref 6–20)
CALCIUM SERPL-MCNC: 8.7 MG/DL (ref 8.6–10)
CHLORIDE SERPL-SCNC: 98 MMOL/L (ref 98–107)
CREAT SERPL-MCNC: 0.56 MG/DL (ref 0.51–0.95)
D DIMER PPP FEU-MCNC: 1.04 UG/ML FEU (ref 0–0.5)
DEPRECATED HCO3 PLAS-SCNC: 22 MMOL/L (ref 22–29)
EOSINOPHIL # BLD AUTO: 0.1 10E3/UL (ref 0–0.7)
EOSINOPHIL NFR BLD AUTO: 1 %
ERYTHROCYTE [DISTWIDTH] IN BLOOD BY AUTOMATED COUNT: 11.9 % (ref 10–15)
GFR SERPL CREATININE-BSD FRML MDRD: >90 ML/MIN/1.73M2
GLUCOSE SERPL-MCNC: 134 MG/DL (ref 70–99)
HCT VFR BLD AUTO: 35.9 % (ref 35–47)
HGB BLD-MCNC: 12.3 G/DL (ref 11.7–15.7)
IMM GRANULOCYTES # BLD: 0 10E3/UL
IMM GRANULOCYTES NFR BLD: 0 %
LYMPHOCYTES # BLD AUTO: 2.3 10E3/UL (ref 0.8–5.3)
LYMPHOCYTES NFR BLD AUTO: 21 %
MCH RBC QN AUTO: 31.2 PG (ref 26.5–33)
MCHC RBC AUTO-ENTMCNC: 34.3 G/DL (ref 31.5–36.5)
MCV RBC AUTO: 91 FL (ref 78–100)
MONOCYTES # BLD AUTO: 0.7 10E3/UL (ref 0–1.3)
MONOCYTES NFR BLD AUTO: 7 %
NEUTROPHILS # BLD AUTO: 7.8 10E3/UL (ref 1.6–8.3)
NEUTROPHILS NFR BLD AUTO: 70 %
NRBC # BLD AUTO: 0 10E3/UL
NRBC BLD AUTO-RTO: 0 /100
PLATELET # BLD AUTO: 197 10E3/UL (ref 150–450)
POTASSIUM SERPL-SCNC: 3.7 MMOL/L (ref 3.4–5.3)
RBC # BLD AUTO: 3.94 10E6/UL (ref 3.8–5.2)
SODIUM SERPL-SCNC: 134 MMOL/L (ref 136–145)
WBC # BLD AUTO: 11 10E3/UL (ref 4–11)

## 2023-09-04 PROCEDURE — 93970 EXTREMITY STUDY: CPT

## 2023-09-04 PROCEDURE — 99284 EMERGENCY DEPT VISIT MOD MDM: CPT | Mod: 25 | Performed by: EMERGENCY MEDICINE

## 2023-09-04 PROCEDURE — 80048 BASIC METABOLIC PNL TOTAL CA: CPT | Performed by: EMERGENCY MEDICINE

## 2023-09-04 PROCEDURE — 85379 FIBRIN DEGRADATION QUANT: CPT | Performed by: EMERGENCY MEDICINE

## 2023-09-04 PROCEDURE — 71045 X-RAY EXAM CHEST 1 VIEW: CPT

## 2023-09-04 PROCEDURE — 99284 EMERGENCY DEPT VISIT MOD MDM: CPT | Performed by: EMERGENCY MEDICINE

## 2023-09-04 PROCEDURE — 258N000003 HC RX IP 258 OP 636: Performed by: EMERGENCY MEDICINE

## 2023-09-04 PROCEDURE — 36415 COLL VENOUS BLD VENIPUNCTURE: CPT | Performed by: EMERGENCY MEDICINE

## 2023-09-04 PROCEDURE — 85025 COMPLETE CBC W/AUTO DIFF WBC: CPT | Performed by: EMERGENCY MEDICINE

## 2023-09-04 PROCEDURE — 96360 HYDRATION IV INFUSION INIT: CPT | Mod: 59 | Performed by: EMERGENCY MEDICINE

## 2023-09-04 RX ADMIN — SODIUM CHLORIDE 1000 ML: 9 INJECTION, SOLUTION INTRAVENOUS at 14:59

## 2023-09-04 ASSESSMENT — ACTIVITIES OF DAILY LIVING (ADL)
ADLS_ACUITY_SCORE: 35
ADLS_ACUITY_SCORE: 35

## 2023-09-04 NOTE — TELEPHONE ENCOUNTER
OB Triage Call  Patient calling, she is 6 weeks pregnant, and she's experiencing shortness of breath that comes and goes, and has increased nausea. She states that her symptoms of achiness and sore throat have resolved, but still feels short of breath and that her heart rate has rapid increases.  She had a negative COVID test twice on different days. She endorses that her left ankle is currently swollen and tender, and does not remember any trauma to it. Negative dagoberto's sign.    Is patient's OB/Midwife with the [facility] or [facility]? [facility]- Proceed with triage Her first OB appointment will be 10/2/2023.     Reason for call: shortness of breath and nausea without vomiting. Her lowest oxygen level today has been 94. She has not tracked her heart rate.    Assessment: Patient at risk for embolus due to pregnancy with a swollen lower extremity.     Plan: not applicable    Patient plans to deliver at  not applicable    Patient's primary OB Provider is THOR De Dios.      Per protocol recommendations Patient to follow home care recommendations. Patient will go to the emergency department.       Is patient's delivering hospital on divert? Does not apply due to not applicable      6w5d    Estimated Date of Delivery: 2024        OB History    Para Term  AB Living   5 2 2 0 2 2   SAB IAB Ectopic Multiple Live Births   2 0 0 0 2      # Outcome Date GA Lbr Александр/2nd Weight Sex Delivery Anes PTL Lv   5 Current            4 SAB 10/2022           3 Term 10/27/21 39w4d / 00:13 3.33 kg (7 lb 5.5 oz) F  EPI N ARTUR      Name: Lizzeth      Apgar1: 8  Apgar5: 8   2 SAB 2020           1 Term 19 38w4d 12:13 / 01:23 3.45 kg (7 lb 9.7 oz) M Vag-Spont EPI N ARTUR      Name: Samir      Apgar1: 8  Apgar5: 9       Lab Results   Component Value Date    GBS Negative 2019          Marilee Morales RN 23 12:35 PM  [facility] Nurse Advisor  Reason for Disposition   Pregnant or postpartum (from 0  "to 6 weeks after delivery)    Additional Information   Negative: SEVERE difficulty breathing (e.g., struggling for each breath, speaks in single words)   Negative: [1] Breathing stopped AND [2] hasn't returned   Negative: Choking on something   Negative: Bluish (or gray) lips or face now   Negative: Difficult to awaken or acting confused (e.g., disoriented, slurred speech)   Negative: Passed out (i.e., lost consciousness, collapsed and was not responding)   Negative: Wheezing started suddenly after medicine, an allergic food or bee sting   Negative: Stridor (harsh sound while breathing in)   Negative: Slow, shallow and weak breathing   Negative: Sounds like a life-threatening emergency to the triager   Negative: Chest pain   Negative: [1] Wheezing (high pitched whistling sound) AND [2] previous asthma attacks or use of asthma medicines   Negative: [1] Difficulty breathing AND [2] only present when coughing   Negative: [1] Difficulty breathing AND [2] only from stuffy or runny nose   Negative: [1] Difficulty breathing AND [2] within 14 days of COVID-19 Exposure   Negative: Wheezing can be heard across the room   Negative: Drooling or spitting out saliva (because can't swallow)   Negative: [1] MODERATE difficulty breathing (e.g., speaks in phrases, SOB even at rest, pulse 100-120) AND [2] NEW-onset or WORSE than normal   Negative: Oxygen level (e.g., pulse oximetry) 90 percent or lower   Negative: History of prior \"blood clot\" in leg or lungs (i.e., deep vein thrombosis, pulmonary embolism)   Negative: History of inherited increased risk of blood clots (e.g., Factor 5 Leiden, Anti-thrombin 3, Protein C or Protein S deficiency, Prothrombin mutation)   Negative: Major surgery in the past month   Negative: Hip or leg fracture (broken bone) in past month (or had cast on leg or ankle in past month)   Negative: Illness requiring prolonged bedrest in past month (e.g., immobilization, long hospital stay)   Negative: " "Long-distance travel in past month (e.g., car, bus, train, plane; with trip lasting 6 or more hours)   Negative: Cancer treatment in past six months (or has cancer now)   Negative: Extra heartbeats, irregular heart beating, or heart is beating very fast  (i.e., \"palpitations\")   Negative: [1] Fever > 100.0 F (37.8 C) AND [2] diabetes mellitus or weak immune system (e.g., HIV positive, cancer chemo, splenectomy, organ transplant, chronic steroids)   Negative: [1] Periods where breathing stops and then resumes normally AND [2] bedridden (e.g., CVA)   Negative: [1] Fever > 101 F (38.3 C) AND [2] age > 60 years   Negative: [1] Fever > 100.0 F (37.8 C) AND [2] bedridden (e.g., CVA, chronic illness, recovering from surgery)   Negative: Fever > 103 F (39.4 C)    Protocols used: Breathing Difficulty-A-AH    "

## 2023-09-04 NOTE — Clinical Note
Ethan Watt was seen and treated in our emergency department on 9/4/2023.  She may return to work on 09/06/2023.       If you have any questions or concerns, please don't hesitate to call.      Sandee Salazar, DO
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
## 2023-09-04 NOTE — DISCHARGE INSTRUCTIONS
Chest x-ray was clear, and blood work did not show any acute emergent cause for your symptoms.  Your white blood cell count was within normal limits, which is reassuring that there is no acute bacterial infection occurring    The ultrasounds of your legs did not reveal any sign of a blood clot.    Your D-dimer was elevated.  This is a blood test used to help rule out blood clots, though it can be elevated for many other reasons, including pregnancy.  Since you had an otherwise negative work-up, it is less likely that your symptoms are due to a blood clot    A message was sent to your OB/GYN, but you should follow-up with Dr. De Dios within 1 week.  If symptoms persist, worsen, or if it is deemed that you should undergo a CT scan to rule out a blood clot in your lungs, you can return to the ER for evaluation or your provider can help set this testing as an outpatient basis    May continue to take Tylenol per bottle instructions as needed to help with any pain or discomfort.  Be sure to drink plenty of fluids and stay well-hydrated    If you develop chest pain, significant worsening of your breathing or other symptoms, swelling pain in 1 extremity, or any new concerns, do not hesitate to return promptly to the ER for evaluation

## 2023-09-04 NOTE — ED TRIAGE NOTES
Cough, cold sx for a few days. Negative covid test x2, last tested at home last wed. Pt c/o feeling sob that has been persisting. No fever. Pt notes she is about 6 weeks pregnant.      Triage Assessment       Row Name 09/04/23 1827       Triage Assessment (Adult)    Airway WDL WDL       Respiratory WDL    Respiratory WDL WDL       Cardiac WDL    Cardiac WDL WDL

## 2023-09-14 ENCOUNTER — HOSPITAL ENCOUNTER (OUTPATIENT)
Dept: ULTRASOUND IMAGING | Facility: CLINIC | Age: 38
Discharge: HOME | End: 2023-09-14
Attending: OBSTETRICS & GYNECOLOGY | Admitting: OBSTETRICS & GYNECOLOGY
Payer: COMMERCIAL

## 2023-09-14 PROCEDURE — 76801 OB US < 14 WKS SINGLE FETUS: CPT

## 2023-09-22 ENCOUNTER — TELEPHONE (OUTPATIENT)
Dept: OBGYN | Facility: CLINIC | Age: 38
End: 2023-09-22
Payer: COMMERCIAL

## 2023-09-22 NOTE — TELEPHONE ENCOUNTER
[facility] Call [facility]    Phone Message    May a detailed message be left on voicemail: yes     Reason for Call: Other: Pt had an US on 9/14/23. She had spoken to imaging about the photos from the US and they advised pt to call Dr De Dios's office so that the Dr can ok the images to be uploaded to [facility] for pt access. Please review and authorize images to be uploaded. Please call pt with any questions. Thank you!     Action Taken: Message routed to:  [facility] p [phone]    Travel Screening: Not Applicable

## 2023-09-22 NOTE — TELEPHONE ENCOUNTER
Imaging got back to me - they were able to release the images to patient and they say it has been viewed by patient.

## 2023-09-22 NOTE — TELEPHONE ENCOUNTER
We don't have access to release images from ultrasound. This would have to be done through imaging.     Called [facility] imaging department - they state that they usually text patient the images. They will have to look into this. Images usually are deleted after 7 days on the US machine. Will await for imaging to get back to me - and then I will reach out to patient.  Salud Braga, [facility]

## 2023-09-22 NOTE — TELEPHONE ENCOUNTER
"Due date based off of LMP would be 04/24/24.     Imaging report says on 09/14, US showed 8w2d. This would put due date at 4/23/24. However, under the impression it states \"Single living intrauterine gestation at 8 weeks and 2 days, EDC 7/18/2023.\"    Please review/advise.  "

## 2023-09-22 NOTE — TELEPHONE ENCOUNTER
"RN routing to provider to advise on correct dating for pt.    Pt currently 9w2d based on LMP of 7/19/2023 giving LISSA of 4/24/2023.    Per US on 9/14: \"IMPRESSION:   1.  Single living intrauterine gestation at 8 weeks and 2 days, EDC  7/18/2023.  2.  Inferior subchorionic hemorrhage measuring up to 3.0 cm.\"    Per provider notes: \"Single living intrauterine gestation at 8 weeks and 2 days, EDC 7/18/2023. \"    RN can call imaging for addendum as needed once correct LISSA established.     Zenaida Betancur RN on 9/22/2023 at 10:47 AM    "

## 2023-09-22 NOTE — TELEPHONE ENCOUNTER
[facility]    Phone Message    May a detailed message be left on voicemail: yes     Reason for Call: Requesting Results     Name/type of test: Ultrasound   Date of test: 9/14/23  Was test done at a location other than [facility] (Please fill in the location if not [facility])?: No    Pt received message about US looking fine however she is questioning if the due date listed was a typo since it appears to be 3 months after what she was anticipating. Please review and call pt back to discuss further. Thank you!      Action Taken: Message routed to:  [facility] p [phone]    Travel Screening: Not Applicable

## 2023-09-23 NOTE — TELEPHONE ENCOUNTER
Yes, her due date is 4/24/24 based on her last menstrual period which is consistent with this ultrasound.  The ultrasound report shows the last menstrual period date.  Please let patient know and call Radiology to fix the typo.  Thanks.    ~Sherly De Dios,

## 2023-09-25 NOTE — TELEPHONE ENCOUNTER
Spoke with the [facility] radiology and she states she will talk to the radiologist to addend the US on 9/14.  She states that the radiologist put in the impression the EDC and states that is the date of conception.  I explained to her that typically the radiologist will put in the estimated due date on the impression and not date of conception as we already know the pt's LMP.    Notified pt of LISSA being 4/24/24.    Lakeisha Mcneal RN

## 2023-10-01 LAB
ABO/RH(D): NORMAL
ANTIBODY SCREEN: NEGATIVE
SPECIMEN EXPIRATION DATE: NORMAL

## 2023-10-02 ENCOUNTER — PRENATAL OFFICE VISIT (OUTPATIENT)
Dept: OBGYN | Facility: OTHER | Age: 38
End: 2023-10-02
Payer: COMMERCIAL

## 2023-10-02 VITALS — WEIGHT: 158.7 LBS | SYSTOLIC BLOOD PRESSURE: 109 MMHG | BODY MASS INDEX: 25.61 KG/M2 | DIASTOLIC BLOOD PRESSURE: 70 MMHG

## 2023-10-02 DIAGNOSIS — O46.8X1 SUBCHORIONIC HEMATOMA IN FIRST TRIMESTER, SINGLE OR UNSPECIFIED FETUS: ICD-10-CM

## 2023-10-02 DIAGNOSIS — Z34.80 PRENATAL CARE, SUBSEQUENT PREGNANCY, UNSPECIFIED TRIMESTER: ICD-10-CM

## 2023-10-02 DIAGNOSIS — O41.8X10 SUBCHORIONIC HEMATOMA IN FIRST TRIMESTER, SINGLE OR UNSPECIFIED FETUS: ICD-10-CM

## 2023-10-02 DIAGNOSIS — O09.521 MULTIGRAVIDA OF ADVANCED MATERNAL AGE IN FIRST TRIMESTER: Primary | ICD-10-CM

## 2023-10-02 DIAGNOSIS — Z87.730 HISTORY OF CLEFT PALATE: ICD-10-CM

## 2023-10-02 DIAGNOSIS — Z98.890 H/O LEEP: ICD-10-CM

## 2023-10-02 PROBLEM — D69.6 BENIGN GESTATIONAL THROMBOCYTOPENIA IN THIRD TRIMESTER (H): Status: RESOLVED | Noted: 2019-04-22 | Resolved: 2023-10-02

## 2023-10-02 PROBLEM — Z67.91 RHD NEGATIVE: Status: RESOLVED | Noted: 2018-10-15 | Resolved: 2023-10-02

## 2023-10-02 PROBLEM — O99.113 BENIGN GESTATIONAL THROMBOCYTOPENIA IN THIRD TRIMESTER (H): Status: RESOLVED | Noted: 2019-04-22 | Resolved: 2023-10-02

## 2023-10-02 PROBLEM — K59.00 CONSTIPATION: Status: RESOLVED | Noted: 2018-10-10 | Resolved: 2023-10-02

## 2023-10-02 PROBLEM — Z34.90 SUPERVISION OF NORMAL PREGNANCY: Status: RESOLVED | Noted: 2019-04-23 | Resolved: 2023-10-02

## 2023-10-02 PROBLEM — O09.90 HIGH-RISK PREGNANCY: Status: RESOLVED | Noted: 2023-08-16 | Resolved: 2023-10-02

## 2023-10-02 LAB
ALBUMIN UR-MCNC: NEGATIVE MG/DL
APPEARANCE UR: CLEAR
BILIRUB UR QL STRIP: NEGATIVE
COLOR UR AUTO: YELLOW
ERYTHROCYTE [DISTWIDTH] IN BLOOD BY AUTOMATED COUNT: 12.6 % (ref 10–15)
GLUCOSE UR STRIP-MCNC: NEGATIVE MG/DL
HCT VFR BLD AUTO: 35.2 % (ref 35–47)
HGB BLD-MCNC: 11.7 G/DL (ref 11.7–15.7)
HGB UR QL STRIP: NEGATIVE
KETONES UR STRIP-MCNC: NEGATIVE MG/DL
LEUKOCYTE ESTERASE UR QL STRIP: NEGATIVE
MCH RBC QN AUTO: 31.5 PG (ref 26.5–33)
MCHC RBC AUTO-ENTMCNC: 33.2 G/DL (ref 31.5–36.5)
MCV RBC AUTO: 95 FL (ref 78–100)
NITRATE UR QL: NEGATIVE
PH UR STRIP: 5.5 [PH] (ref 5–7)
PLATELET # BLD AUTO: 194 10E3/UL (ref 150–450)
RBC # BLD AUTO: 3.72 10E6/UL (ref 3.8–5.2)
RBC #/AREA URNS AUTO: ABNORMAL /HPF
SP GR UR STRIP: >=1.03 (ref 1–1.03)
SQUAMOUS #/AREA URNS AUTO: ABNORMAL /LPF
UROBILINOGEN UR STRIP-ACNC: 0.2 E.U./DL
WBC # BLD AUTO: 9.6 10E3/UL (ref 4–11)
WBC #/AREA URNS AUTO: ABNORMAL /HPF

## 2023-10-02 PROCEDURE — 87389 HIV-1 AG W/HIV-1&-2 AB AG IA: CPT | Performed by: OBSTETRICS & GYNECOLOGY

## 2023-10-02 PROCEDURE — 85027 COMPLETE CBC AUTOMATED: CPT | Performed by: OBSTETRICS & GYNECOLOGY

## 2023-10-02 PROCEDURE — 87340 HEPATITIS B SURFACE AG IA: CPT | Performed by: OBSTETRICS & GYNECOLOGY

## 2023-10-02 PROCEDURE — 86900 BLOOD TYPING SEROLOGIC ABO: CPT | Performed by: OBSTETRICS & GYNECOLOGY

## 2023-10-02 PROCEDURE — 86850 RBC ANTIBODY SCREEN: CPT | Performed by: OBSTETRICS & GYNECOLOGY

## 2023-10-02 PROCEDURE — 81001 URINALYSIS AUTO W/SCOPE: CPT | Performed by: OBSTETRICS & GYNECOLOGY

## 2023-10-02 PROCEDURE — 86901 BLOOD TYPING SEROLOGIC RH(D): CPT | Performed by: OBSTETRICS & GYNECOLOGY

## 2023-10-02 PROCEDURE — 99214 OFFICE O/P EST MOD 30 MIN: CPT | Performed by: OBSTETRICS & GYNECOLOGY

## 2023-10-02 PROCEDURE — 87591 N.GONORRHOEAE DNA AMP PROB: CPT | Performed by: OBSTETRICS & GYNECOLOGY

## 2023-10-02 PROCEDURE — 36415 COLL VENOUS BLD VENIPUNCTURE: CPT | Performed by: OBSTETRICS & GYNECOLOGY

## 2023-10-02 PROCEDURE — 87086 URINE CULTURE/COLONY COUNT: CPT | Performed by: OBSTETRICS & GYNECOLOGY

## 2023-10-02 PROCEDURE — 86780 TREPONEMA PALLIDUM: CPT | Performed by: OBSTETRICS & GYNECOLOGY

## 2023-10-02 PROCEDURE — 87491 CHLMYD TRACH DNA AMP PROBE: CPT | Performed by: OBSTETRICS & GYNECOLOGY

## 2023-10-02 PROCEDURE — 86762 RUBELLA ANTIBODY: CPT | Performed by: OBSTETRICS & GYNECOLOGY

## 2023-10-02 NOTE — PATIENT INSTRUCTIONS
Please call if you any questions.    [address]   [address]  [phone]        Sherly De Dios,

## 2023-10-02 NOTE — PROGRESS NOTES
HPI:    Ethan is a 38 year old yo  at 10 5/7 weeks by LMP and early ultrasound who presents today for her initial OB visit.  Patient is not due for a pap smear today.    Issues: Nausea    Past OB Hx: 2 SABs.  2      Father of baby: No health issues       History reviewed. No pertinent past medical history.          Past Surgical History:   Procedure Laterality Date     LAPAROSCOPIC CHOLECYSTECTOMY N/A 2019    Procedure: LAPAROSCOPIC CHOLECYSTECTOMY;  Surgeon: Davey Rinaldi MD;  Location: [facility] OR     LEEP TX, CERVICAL          Family History   Problem Relation Age of Onset     No Known Problems Mother      Cancer Maternal Grandfather      Coronary Artery Disease Maternal Grandfather      Myocardial Infarction Maternal Grandfather      Other - See Comments Maternal Grandfather         stroke     Other - See Comments Maternal Uncle         stroke        Social History     Socioeconomic History     Marital status:      Spouse name: Not on file     Number of children: Not on file     Years of education: Not on file     Highest education level: Not on file   Occupational History     Not on file   Tobacco Use     Smoking status: Former     Types: Cigarettes     Quit date: 2014     Years since quittin.7     Smokeless tobacco: Never   Vaping Use     Vaping Use: Never used   Substance and Sexual Activity     Alcohol use: Not Currently     Drug use: No     Sexual activity: Yes     Partners: Male     Birth control/protection: None     Comment: pregnant   Other Topics Concern     Parent/sibling w/ CABG, MI or angioplasty before 65F 55M? Not Asked   Social History Narrative     Not on file     Social Determinants of Health     Financial Resource Strain: Not on file   Food Insecurity: Not on file   Transportation Needs: Not on file   Physical Activity: Not on file   Stress: Not on file   Social Connections: Not on file   Interpersonal Safety: Not on file   Housing Stability: Not on file          Current Outpatient Medications:      Prenatal Vit-Fe Fumarate-FA (PRENATAL MULTIVITAMIN W/IRON) 27-0.8 MG tablet, Take 1 tablet by mouth daily, Disp: , Rfl:       Objective:  Physical Exam:   Heart=RRR, no murmurs  Thyroid=normal, no masses, no TTP  Lungs=Clear to ascultation bilateral, non-labored breathing  Abd=soft, Nontender/nondistended, +bowel sounds x4  NHWm=679      Assessment:   1.  IUP at 10 5/7 weeks here for her initial OB visit    Plan:    1.  PNV  2.  NOB Labs   3.  Discussed routine prenatal care, quad screen, GCT, anatomy scan at ~19 weeks, frequency of visits.  4.  Discussed first trimester screen and she wants this done.  Referral placed.  5.  [place] [facility] [place]  6.  RTC 4 weeks.  7.  AMA=Level II US  8.  History of LEEP      Discussed physician coverage, tertiary support, diet, exercise, weight gain, schedule of visits, routine and indicated ultrasounds, and childbirth education.    Options for  testing for chromosomal and birth defects were discussed with the patient. Diagnostic tests include CVS and Amniocentesis. We discussed that these tests are definitive but invasive and do carry a risk of fetal loss.   Screening tests include nuchal translucency/blood marker testing in the first trimester and quad screening in the second trimester. We discussed that these are screening tests and not diagnostic tests and that false positives and negatives are a distinct possibility.     30 minutes was spent face to face with the patient today discussing her history, diagnosis, and follow-up plan as noted above.  Over 50% of the visit was spent in counseling and coordination of care.    Discussed aspirin use in pregnancy.  Low-dose aspirin prophylaxis can be beneficial in women at high risk of developing preeclampsia.  I generally recommend we begin aspirin at about 12-13 weeks gestation and continue until at least 36 weeks.    Women with at least one of the following conditions are  considered high risk for developing preeclampsia: Previous pregnancy with preeclampsia,  multifetal-gestation, chronic hypertension, diabetes mellitus, chronic kidney disease, autoimmune disease, and IVF.    Women with more than 1 of the following conditions may also consider low-dose aspirin prophylaxis in pregnancy: Nulliparity, BMI greater than 30, family history of preeclampsia (mother or sister), AMA, socio-demographic characteristics, personal risk factors.      Patient does not meet the above criteria.     Total Visit Time: 30 minutes    Sherly De Dios DO

## 2023-10-03 ENCOUNTER — TRANSCRIBE ORDERS (OUTPATIENT)
Dept: MATERNAL FETAL MEDICINE | Facility: CLINIC | Age: 38
End: 2023-10-03
Payer: COMMERCIAL

## 2023-10-03 DIAGNOSIS — O26.90 PREGNANCY RELATED CONDITION: Primary | ICD-10-CM

## 2023-10-03 LAB
C TRACH DNA SPEC QL NAA+PROBE: NEGATIVE
HBV SURFACE AG SERPL QL IA: NONREACTIVE
HIV 1+2 AB+HIV1 P24 AG SERPL QL IA: NONREACTIVE
N GONORRHOEA DNA SPEC QL NAA+PROBE: NEGATIVE
RUBV IGG SERPL QL IA: 1.8 INDEX
RUBV IGG SERPL QL IA: POSITIVE
T PALLIDUM AB SER QL: NONREACTIVE

## 2023-10-04 ENCOUNTER — PRE VISIT (OUTPATIENT)
Dept: MATERNAL FETAL MEDICINE | Facility: CLINIC | Age: 38
End: 2023-10-04
Payer: COMMERCIAL

## 2023-10-04 LAB — BACTERIA UR CULT: NO GROWTH

## 2023-10-05 ENCOUNTER — MYC MEDICAL ADVICE (OUTPATIENT)
Dept: OBGYN | Facility: OTHER | Age: 38
End: 2023-10-05
Payer: COMMERCIAL

## 2023-10-06 NOTE — TELEPHONE ENCOUNTER
I actually don't think this patient needs ASA.  I addended my note.  She has no risk factors:    Previous pregnancy with preeclampsia,  multifetal-gestation, chronic hypertension, diabetes mellitus, chronic kidney disease, autoimmune disease, and IVF.    Women with more than 1 of the following conditions may also consider low-dose aspirin prophylaxis in pregnancy: Nulliparity, BMI greater than 30, family history of preeclampsia (mother or sister), AMA, socio-demographic characteristics, personal risk factors.      Sherly De Dios,

## 2023-10-13 ENCOUNTER — OFFICE VISIT (OUTPATIENT)
Dept: MATERNAL FETAL MEDICINE | Facility: CLINIC | Age: 38
End: 2023-10-13
Attending: OBSTETRICS & GYNECOLOGY
Payer: COMMERCIAL

## 2023-10-13 ENCOUNTER — MEDICAL CORRESPONDENCE (OUTPATIENT)
Dept: HEALTH INFORMATION MANAGEMENT | Facility: CLINIC | Age: 38
End: 2023-10-13

## 2023-10-13 ENCOUNTER — LAB (OUTPATIENT)
Dept: LAB | Facility: CLINIC | Age: 38
End: 2023-10-13
Attending: OBSTETRICS & GYNECOLOGY
Payer: COMMERCIAL

## 2023-10-13 ENCOUNTER — HOSPITAL ENCOUNTER (OUTPATIENT)
Dept: ULTRASOUND IMAGING | Facility: CLINIC | Age: 38
Discharge: HOME | End: 2023-10-13
Attending: OBSTETRICS & GYNECOLOGY
Payer: COMMERCIAL

## 2023-10-13 DIAGNOSIS — O26.90 PREGNANCY RELATED CONDITION: ICD-10-CM

## 2023-10-13 DIAGNOSIS — O09.521 MULTIGRAVIDA OF ADVANCED MATERNAL AGE IN FIRST TRIMESTER: ICD-10-CM

## 2023-10-13 DIAGNOSIS — O09.521 MULTIGRAVIDA OF ADVANCED MATERNAL AGE IN FIRST TRIMESTER: Primary | ICD-10-CM

## 2023-10-13 DIAGNOSIS — Z36.9 FIRST TRIMESTER SCREENING: ICD-10-CM

## 2023-10-13 PROCEDURE — 76813 OB US NUCHAL MEAS 1 GEST: CPT | Mod: 26 | Performed by: OBSTETRICS & GYNECOLOGY

## 2023-10-13 PROCEDURE — 999N000069 HC STATISTIC GENETIC COUNSELING, < 16 MIN

## 2023-10-13 PROCEDURE — 76813 OB US NUCHAL MEAS 1 GEST: CPT

## 2023-10-13 PROCEDURE — 36415 COLL VENOUS BLD VENIPUNCTURE: CPT

## 2023-10-13 NOTE — PROGRESS NOTES
[facility] Fetal [facility]  Genetic Counseling Consult    Patient:  Ethan Watt YOB: 1985   Date of Service:  10/13/23   MRN: [id number]    Ethan was seen at the [facility] for genetic consultation. The indication for genetic counseling is advanced maternal age and personal medical history. The patient was unaccompanied to this visit.     The session was conducted in English.        IMPRESSION/ PLAN   1. Ethan has not had genetic screening in this pregnancy but elected to have screening today.     2. During today's [facility] visit, Ethan had a blood draw for expanded non-invasive prenatal screening (NIPS) through [employer]. The expanded NIPS screens for trisomy 21, 18, and 13 and 22q11.2 deletion syndrome. The patient opted to screen for sex chromosome aneuploidies, including reported fetal sex.  In accordance with current guidelines, other microdeletion syndromes and rare autosomal trisomies were not included. Results are expected in 5-10 days. The patient will be called with results and if they do not answer they requested a detailed message with results on their voicemail, NOT including predicted fetal sex information. Instead, Ethan requested we leave a predicted sex envelope at the [facility]  for her , Isidro to . Consent to share PHI with Isidro was signed today. Ethan was informed that results, including fetal sex, will be available in [employer].    3. Ethan had a nuchal translucency ultrasound today. Please see the ultrasound report for further details.    4. Further recommendations include a fetal anatomy level II ultrasound with [facility]. The upcoming ultrasound has been scheduled for 2023.    PREGNANCY HISTORY   /Parity:       Ethan shared that she had a viral infection at about 6 weeks but that her fevers were low-grade and well controlled. No other complications or exposures of concern  "were shared at today's visit. Ethan's pregnancy history is significant for:   Spontaneous vaginal delivery at 38w4d  Non-spontaneous vaginal delivery at 39w4d  Negative NIPS  Complicated by thrombocytopenia    CURRENT PREGNANCY   Current Age: 38 year old   Age at Delivery: 39 year old  LISSA: 4/24/2024, by Last Menstrual Period                                   Gestational Age: 12w2d  This pregnancy is a single gestation.   This pregnancy was conceived spontaneously.    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 She has been counseled on this before, so in short we reviewed the recurrence risk for isolated non-syndromic cleft palate is 2-3%. Taking a prenatal vitamin with folic acid has been associated with a reduced risk for clefting. Ultrasound can sometimes detect a cleft but seeing no clefting on ultrasound does not completely remove the possibility of a pregnancy having a cleft.    Ethan has been taking a prenatal vitamin and would not change pregnancy management decisions if her pregnancy was found to have a cleft.       FAMILY HISTORY   A three-generation pedigree was obtained today, as I could not locate the prior pedigree taken for Ethan in 2019, and is scanned under the \"Media\" tab in Epic. The family history was reported by Ethan.    The following significant findings were reported today:   Ethan and Isidro have two children together  Their son is three and has a history of speech delay which has been resolved. Ethan does have some concerns for autism and would like him to be evaluated, however, no formal diagnosis has been made.  Their daughter is almost [age] years old and is healthy.  Ethan has had two first trimester pregnancy losses.  Ethan has one brother who is healthy  Her brother has one son who has a history of pyloric stenosis  Ethan's mother had one miscarriage and two stillbirths  Ethan's maternal aunts and uncles have histories of hypertension, elevated " cholesterol  Ethan's maternal grandfather passed away at 79 due to small cell carcinoma and had a history of strokes, coronary artery disease, and heart attack  Ethan's maternal grandmother is still living and has a heart murmur  Ethan's father passed away in an accident  There is limited information known about his extended family, but Ethan knows her maternal grandmother passed away in her 70s and had a history of a stomach tumor  Isidro is healthy at 33  He has two healthy siblings who both have healthy children  Isidro's mother is healthy  Isidro has a maternal uncle and maternal first cousin with type 2 diabetes  Isidro's maternal grandfather passed away at a relatively young age, Ethan was unsure about an exact age, and had a history of type 1 diabetes  Isidro's father was recently diagnosed with atrial fibrillation  Isidro's paternal grandfather passed away suddenly at a relatively young age, Ethan was unsure about an exact age or cause of death    Otherwise, the reported family history is unremarkable for multiple miscarriages, stillbirths, birth defects, intellectual disabilities, known genetic conditions, cancer diagnosed under 50, and consanguinity.    We discussed that if Ethan's son receives a diagnosis of autism, a genetic work-up should be offered to him. This is because some forms of autism spectrum disorders can be associated with specific genetic conditions, approximately 30% of individuals who have autism will have an identifiable genetic cause for this history. However, the majority of cases are due to the combination of genes and environmental factors that can affect development (multifactorial). Since there is a genetic component to autism spectrum disorders, the recurrence risk for other family members is higher among first-degree relatives of the individual with an autism spectrum disorder (full siblings), and decreases with distance in relationship to other second-degree relatives.      We also  discussed the multifactorial nature of diabetes. Type 1 diabetes falls under the umbrella of autoimmune conditions which are often multifactorial disorders. Once an autoimmune disease is present in a family, other relatives may be at increased risk to develop the same disease or a different autoimmune disease. Presymptomatic and prenatal testing are not available for type 1 or type 2 diabetes.    Lastly, we also discussed Ethan's mother's pregnancy history. There can be many explanations of recurrent pregnancy loss and stillbirths, some of which are genetic in nature. We discussed that later losses can often be more concerning for an inherited etiology than early losses. We also revisited the conversation about carrier screening and that there are some conditions on the largest carrier screen panel that can cause stillbirths. Other heritable causes of pregnancy loss could be familial chromosome rearrangements. It is possible that there is an underlying genetic etiology that connects Ethan's mother's pregnancy history with Ethan's history of cleft palate and Ethan's son's history of speech delay. However, it is also possible that there are separate explanations for all three. Ethan shared she was adverse to doing diagnostic testing.    RISK ASSESSMENT FOR CHROMOSOME CONDITIONS   We explained that the risk for fetal chromosome abnormalities increases with maternal age. We discussed specific features of common chromosome abnormalities, including Down syndrome, trisomy 13, trisomy 18, and sex chromosome trisomies.    At age 38 in the first trimester, the risk to have a baby with Down syndrome is 1 in 56.   At age 38 in the first trimester, the risk to have a baby with any chromosome abnormality is 1 in 29.    We also discussed that current ACMG guidelines recommend that screening for 22q11.2 deletion syndrome be offered to all pregnant patients. 22q11.2 deletion syndrome has an estimated prevalence of 1 in 990 to 1 in  2148 (0.05-0.1%). Risk is not thought to increase with maternal age. Clinical features are variable but include congenital heart defects, cleft palate, developmental delays, immune system deficiencies, and hearing loss. Approximately 90% of cases are de mina (a sporadic new change in a pregnancy). Cell-free DNA screening for 22q11.2 deletion syndrome is available (Expanded NIPS through [employer]). We discussed the limitations of cell-free DNA screening in detecting microdeletions and the possiblity of false positives and false negatives.     Ethan has not had genetic screening in this pregnancy but elected to have screening today.      GENETIC TESTING OPTIONS FOR CHROMOSOMAL CONDITIONS   Genetic testing during a pregnancy includes screening and diagnostic procedures.      Screening tests are non-invasive which means no risk to the pregnancy and includes ultrasounds and blood work. The benefits and limitations of screening were reviewed. Screening tests provide a risk assessment (chance) specific to the pregnancy for certain fetal chromosome abnormalities but cannot definitively diagnose or exclude a fetal chromosome abnormality. Follow-up genetic counseling and consideration of diagnostic testing is recommended with any abnormal screening result. Diagnostic testing during a pregnancy is more certain and can test for more conditions. However, the tests do have a risk of miscarriage that requires careful consideration. These tests can detect fetal chromosome abnormalities with greater than 99% certainty. Results can be compromised by maternal cell contamination or mosaicism and are limited by the resolution of current genetic testing technology.     There is no screening or diagnostic test that detects all forms of birth defects or intellectual disability.     We discussed the following screening options:   Non-invasive prenatal testing (NIPS)  Also called cell-free DNA screening because it detects  chromosomes from the placenta in the pregnant person's blood  Can be done any time after 10 weeks gestation  Screens for trisomy 21, trisomy 18, trisomy 13, and sex chromosome aneuploidies  Expanded NIPS also screens for 22q11.2 deletion syndrome, the data on this condition is more limited, so there is not a positive or negative predictive value available for results interpretation. Ethan elected to pursue screening for 22q11.2 deletion syndrome.    Cannot screen for open neural tube defects, maternal serum AFP after 15 weeks is recommended    We discussed the following ultrasound options:  Nuchal translucency (NT) ultrasound  Ultrasound between [id number] that includes nuchal translucency measurement and nasal bone assessments  Nuchal translucency refers to the space at the back of the neck where fluid builds up. All babies at this stage have fluid and there is only concern if there is too much fluid  Nasal bone refers to the small bone in the nose. There is concern for conditions like Down syndrome if the bone cannot be seen at all  This ultrasound can be done as part of first trimester screening, at the same time as another screen (NIPS), at the same time as a CVS, or if the patients does not want genetic screening.  Markers on ultrasound detects about 70% of pregnancies with aneuploidy  Abnormalities on NT ultrasound can also increase the risk for a birth defect, like a heart defect  Comprehensive level II ultrasound (Fetal Anatomy Ultrasound)  Ultrasound done between 18-20 weeks gestation  Screens for major birth defects and markers for aneuploidy (like trisomy 21 and trisomy 18)  Includes looking at the fetus/baby's growth, heart, organs (stomach, kidneys), placenta, and amniotic fluid    We discussed the following diagnostic options:   Chorionic villus sampling (CVS)  Invasive diagnostic procedure done between 10w0d and 13w6d  The procedure collects a small sample from the placenta for the purpose of  chromosomal testing and/or other genetic testing  Diagnostic result; more than 99% sensitivity for fetal chromosome abnormalities  Cannot screen for open neural tube defects, maternal serum AFP after 15 weeks is recommended  Amniocentesis  Invasive diagnostic procedure done after 15 weeks gestation  The procedure collects a small sample of amniotic fluid for the purpose of chromosomal testing and/or other genetic testing  Diagnostic result; more than 99% sensitivity for fetal chromosome abnormalities  Testing for AFP in the amniotic fluid can test for open neural tube defects      CARRIER SCREENING   Expanded carrier screening is available to screen for autosomal recessive conditions and X-linked conditions in a large list of genes. Autosomal recessive conditions happen when a mutation has been inherited from the egg and sperm and include conditions like cystic fibrosis, thalassemia, hearing loss, spinal muscular atrophy, and more. X-linked conditions happen when a mutation has been inherited from the egg and include conditions like fragile X syndrome.  screening was also reviewed. About [place]  Screening    The patient declined carrier screening today. Ethan has been counseled on it in prior pregnancies and declined a thorough discussion of the options. She is aware the option will remain, and can contact us if she or her partner, Isidro, would like to pursue screening.          It was a pleasure to be involved with Ethan s care. Face-to-face time of the meeting was  40  minutes.    Margo Marie MS, [facility]  Maternal Fetal Medicine  Office: [phone]  [facility]: [phone]   Fax: [phone]  [facility]    Patient seen, evaluated and discussed with the Genetic Counseling Student. I have verified the content of the note, which accurately reflects my assessment of the patient and the plan of care.  Supervising Genetic Counselor    Dominique Salcedo MS, [facility]  Licensed Genetic  Counselor  [facility]  Maternal Fetal Medicine  Ph: [phone]  [email or web address]

## 2023-10-13 NOTE — PROGRESS NOTES
Please refer to ultrasound report under 'Imaging' Studies of 'Chart Review' tabs.    Wander Guido M.D.

## 2023-10-13 NOTE — NURSING NOTE
Patient presents to [facility] for GC/NT at 12w2d due to AMA and maternal history of cleft palate. Denies LOF, vaginal bleeding, cramping/contractions. SBAR given to [facility] MD, see their note in Epic.

## 2023-10-20 ENCOUNTER — TELEPHONE (OUTPATIENT)
Dept: MATERNAL FETAL MEDICINE | Facility: CLINIC | Age: 38
End: 2023-10-20
Payer: COMMERCIAL

## 2023-10-20 LAB — SCANNED LAB RESULT: NORMAL

## 2023-10-20 NOTE — TELEPHONE ENCOUNTER
October 20, 2023     I spoke with Ethan Watt regarding her NIPT results.      Results indicate NO ANEUPLOIDY DETECTED for chromosomes 21, 18, 13, or the sex chromosomes. Ethan also had screening for 22q11.2 deletion syndrome which came back NEGATIVE. Per patient request, predicted fetal sex was not disclosed over the phone, instead Ethan's partner, Isidro, is going to  that information in an envelope at the  of the [facility]. A consent to communicate was signed at the time of Ethan's visit.      This puts her current pregnancy at low risk for Down syndrome, trisomy 18, trisomy 13, sex chromosome abnormalities, and 22q11.2 deletion syndrome. This test is reported to have the following sensitivities: Down syndrome- >99.9%, trisomy 18- >99.9%, and trisomy 13- >99.9%. Although these results are reassuring, this does not replace a standard chromosome analysis from a chorionic villus sampling or amniocentesis.      MSAFP is the appropriate second trimester screening test for open neural tube defects; the maternal quad screen is not recommended.     Her results are available in her Epic chart for her primary OB to review.     Kelsea Hernandez MS, [facility]  Certified and Licensed Genetic Counselor  [facility]  Maternal Fetal Medicine  Office: [phone]  [facility]: [phone]   Fax: [phone]  [facility]

## 2023-10-31 ENCOUNTER — TELEPHONE (OUTPATIENT)
Dept: OBGYN | Facility: CLINIC | Age: 38
End: 2023-10-31
Payer: COMMERCIAL

## 2023-10-31 NOTE — TELEPHONE ENCOUNTER
, 14w6d.    Pt states yesterday she looked in the mirror and her face was all red.  She states it is better today but wondering if this is concerning.  She also had a bloody nose yesterday.    Pt denies using any new soaps, lotions, detergents or eating any new foods, etc.    Denies difficulty breathing, swallowing, itching.      Suggested to monitor symptoms for now since her face has improved and has no other symptoms.  Advised to increase fluid intake and use a humidifier in her home as the dry air may have caused the bloody nose.      Routing to Dr. De Dios to advise further as needed.    Lakeisha Mcneal RN

## 2023-11-02 NOTE — TELEPHONE ENCOUNTER
Pt read Page365dionisio mendieta, RN closing encounter.    Zenaida Betancur RN on 11/2/2023 at 2:46 PM

## 2023-11-02 NOTE — TELEPHONE ENCOUNTER
Unable to reach patient via phone. RN left a message and instructed patient to call the clinic at [phone].    RN also sent [employer] message.    Zenaida Betancur RN on 11/2/2023 at 12:28 PM

## 2023-11-06 ENCOUNTER — PRENATAL OFFICE VISIT (OUTPATIENT)
Dept: OBGYN | Facility: OTHER | Age: 38
End: 2023-11-06
Payer: COMMERCIAL

## 2023-11-06 VITALS — WEIGHT: 162.2 LBS | BODY MASS INDEX: 26.18 KG/M2 | DIASTOLIC BLOOD PRESSURE: 69 MMHG | SYSTOLIC BLOOD PRESSURE: 108 MMHG

## 2023-11-06 DIAGNOSIS — Z87.730 HISTORY OF CLEFT PALATE: ICD-10-CM

## 2023-11-06 DIAGNOSIS — O26.899 RH NEGATIVE STATE IN ANTEPARTUM PERIOD: ICD-10-CM

## 2023-11-06 DIAGNOSIS — O41.8X10 SUBCHORIONIC HEMATOMA IN FIRST TRIMESTER, SINGLE OR UNSPECIFIED FETUS: ICD-10-CM

## 2023-11-06 DIAGNOSIS — D69.6 THROMBOCYTOPENIA (H): ICD-10-CM

## 2023-11-06 DIAGNOSIS — O46.8X1 SUBCHORIONIC HEMATOMA IN FIRST TRIMESTER, SINGLE OR UNSPECIFIED FETUS: ICD-10-CM

## 2023-11-06 DIAGNOSIS — O09.522 MULTIGRAVIDA OF ADVANCED MATERNAL AGE IN SECOND TRIMESTER: Primary | ICD-10-CM

## 2023-11-06 DIAGNOSIS — Z67.91 RH NEGATIVE STATE IN ANTEPARTUM PERIOD: ICD-10-CM

## 2023-11-06 PROCEDURE — 99207 PR PRENATAL VISIT: CPT | Performed by: OBSTETRICS & GYNECOLOGY

## 2023-11-06 NOTE — PROGRESS NOTES
38 year old  at 15w5d weeks presents to the clinic for a routine prenatal visit.  Feeling well.  No vaginal bleeding, leakage of fluid, or contractions   Fundal height=s=d  OMLx=877  Thrombocytopenia=10/0=Bxjl=340  Discussed quad screen and she will think about the AFP.  Anatomy ultrasound scheduled already with [facility]  RTC 4 weeks.    Sherly De Dios, DO

## 2023-11-07 PROBLEM — K80.20 CALCULUS OF GALLBLADDER: Status: RESOLVED | Noted: 2018-12-31 | Resolved: 2023-11-07

## 2023-11-07 NOTE — PATIENT INSTRUCTIONS
Please call if you any questions.    [address]   [address]  [phone]        Sherly De Dios,

## 2023-11-27 ENCOUNTER — OFFICE VISIT (OUTPATIENT)
Dept: MATERNAL FETAL MEDICINE | Facility: CLINIC | Age: 38
End: 2023-11-27
Attending: OBSTETRICS & GYNECOLOGY
Payer: COMMERCIAL

## 2023-11-27 ENCOUNTER — HOSPITAL ENCOUNTER (OUTPATIENT)
Dept: ULTRASOUND IMAGING | Facility: CLINIC | Age: 38
Discharge: HOME | End: 2023-11-27
Attending: OBSTETRICS & GYNECOLOGY
Payer: COMMERCIAL

## 2023-11-27 DIAGNOSIS — O09.522 MULTIGRAVIDA OF ADVANCED MATERNAL AGE IN SECOND TRIMESTER: ICD-10-CM

## 2023-11-27 DIAGNOSIS — O09.521 MULTIGRAVIDA OF ADVANCED MATERNAL AGE IN FIRST TRIMESTER: ICD-10-CM

## 2023-11-27 DIAGNOSIS — O28.3 ECHOGENIC INTRACARDIAC FOCUS OF FETUS ON PRENATAL ULTRASOUND: ICD-10-CM

## 2023-11-27 DIAGNOSIS — Z36.2 ENCOUNTER FOR FOLLOW-UP ULTRASOUND OF FETAL ANATOMY: Primary | ICD-10-CM

## 2023-11-27 DIAGNOSIS — Z82.79 FAMILY HISTORY OF CLEFT LIP: ICD-10-CM

## 2023-11-27 PROCEDURE — 99213 OFFICE O/P EST LOW 20 MIN: CPT | Mod: 25 | Performed by: STUDENT IN AN ORGANIZED HEALTH CARE EDUCATION/TRAINING PROGRAM

## 2023-11-27 PROCEDURE — 76811 OB US DETAILED SNGL FETUS: CPT | Mod: 26 | Performed by: STUDENT IN AN ORGANIZED HEALTH CARE EDUCATION/TRAINING PROGRAM

## 2023-11-27 PROCEDURE — 76817 TRANSVAGINAL US OBSTETRIC: CPT | Mod: 26 | Performed by: STUDENT IN AN ORGANIZED HEALTH CARE EDUCATION/TRAINING PROGRAM

## 2023-11-27 PROCEDURE — 76811 OB US DETAILED SNGL FETUS: CPT

## 2023-11-27 NOTE — NURSING NOTE
Patient presents to [facility] for L2 at 18w5d due to AMA. Denies LOF, vaginal bleeding, cramping/contractions. SBAR given to [facility] MD, see their note in Epic.

## 2023-11-27 NOTE — PROGRESS NOTES
"Please see \"Imaging\" tab under \"Chart Review\" for details of today's visit.    Kristin Nieves    " Cesario Runner is a 58 y.o. female    Reason for Visit: f/u IHSS, AF, s/p Watchman    HPI Cesario Runner  denies exertional chest pain,  palpitations, dizziness, syncope, and worsening leg swelling. She is being treated for back pain. She is also SOB. Her weight is stable. Review of Systems   Constitutional: Negative for chills, diaphoresis and fever. HENT: Negative for congestion, nosebleeds and tinnitus. Eyes: Negative for redness. Respiratory: Negative for cough, shortness of breath and wheezing. Cardiovascular: Negative for chest pain, palpitations and leg swelling. Gastrointestinal: Negative for blood in stool and nausea. Genitourinary: Negative for dysuria and hematuria. Musculoskeletal: Negative for myalgias and neck pain. Neurological: Negative for dizziness and light-headedness. Hematological: Does not bruise/bleed easily. Physical Exam  Vitals signs and nursing note reviewed. Constitutional:       Appearance: She is well-developed. Comments: obese   HENT:      Head: Normocephalic and atraumatic. Eyes:      Conjunctiva/sclera: Conjunctivae normal.   Neck:      Musculoskeletal: Normal range of motion and neck supple. Cardiovascular:      Rate and Rhythm: Regular rhythm. Bradycardia present. Heart sounds: S1 normal and S2 normal. No murmur. No gallop. Pulmonary:      Effort: Pulmonary effort is normal.      Breath sounds: Normal breath sounds. No wheezing. Abdominal:      General: Bowel sounds are normal.      Palpations: Abdomen is soft. Musculoskeletal: Normal range of motion. General: Swelling (2+ BLE) present. Skin:     General: Skin is warm and dry. Neurological:      Mental Status: She is alert and oriented to person, place, and time.    Psychiatric:         Behavior: Behavior normal.       Wt Readings from Last 3 Encounters:   06/08/20 210 lb 9.6 oz (95.5 kg)   05/15/20 210 lb 6.4 oz (95.4 kg)   03/17/20 208 lb (94.3 kg)     BP Readings from ([facility] [address]     Echo 6/10 normal LVEF, no mention of IHSS    Mitral insufficiency     echo 6/10 mild-mod MR    VARUN (obstructive sleep apnea)     on CPAP    Palpitations     stable    Tobacco abuse     cessation discussed       Social History     Socioeconomic History    Marital status:      Spouse name: None    Number of children: None    Years of education: None    Highest education level: None   Occupational History    None   Social Needs    Financial resource strain: None    Food insecurity     Worry: None     Inability: None    Transportation needs     Medical: None     Non-medical: None   Tobacco Use    Smoking status: Former Smoker     Packs/day: 0.50     Years: 40.00     Pack years: 20.00     Last attempt to quit: 3/8/2020     Years since quittin.2    Smokeless tobacco: Never Used   Substance and Sexual Activity    Alcohol use: Yes     Alcohol/week: 4.0 standard drinks     Types: 3 Shots of liquor, 1 Standard drinks or equivalent per week     Comment: minimal  2-3 glasses per week.     Drug use: No    Sexual activity: None   Lifestyle    Physical activity     Days per week: None     Minutes per session: None    Stress: None   Relationships    Social connections     Talks on phone: None     Gets together: None     Attends Bahai service: None     Active member of club or organization: None     Attends meetings of clubs or organizations: None     Relationship status: None    Intimate partner violence     Fear of current or ex partner: None     Emotionally abused: None     Physically abused: None     Forced sexual activity: None   Other Topics Concern    None   Social History Narrative    None       Past Surgical History:   Procedure Laterality Date    ATRIAL ABLATION SURGERY      CARDIAC CATHETERIZATION      CARDIAC SURGERY      watchman device    CARDIOVERSION      ENDOSCOPY, SMALL BOWEL N/A 2019    CAPSULE ENDOSCOPY performed by Titi Maldonado MD at [address]

## 2023-12-04 ENCOUNTER — PRENATAL OFFICE VISIT (OUTPATIENT)
Dept: OBGYN | Facility: OTHER | Age: 38
End: 2023-12-04
Payer: COMMERCIAL

## 2023-12-04 VITALS — DIASTOLIC BLOOD PRESSURE: 77 MMHG | SYSTOLIC BLOOD PRESSURE: 121 MMHG | WEIGHT: 161.3 LBS | BODY MASS INDEX: 26.03 KG/M2

## 2023-12-04 DIAGNOSIS — O09.522 MULTIGRAVIDA OF ADVANCED MATERNAL AGE IN SECOND TRIMESTER: Primary | ICD-10-CM

## 2023-12-04 DIAGNOSIS — D69.6 THROMBOCYTOPENIA (H): ICD-10-CM

## 2023-12-04 DIAGNOSIS — Z98.890 H/O LEEP: ICD-10-CM

## 2023-12-04 DIAGNOSIS — Z87.730 HISTORY OF CLEFT PALATE: ICD-10-CM

## 2023-12-04 PROCEDURE — 99207 PR PRENATAL VISIT: CPT | Performed by: OBSTETRICS & GYNECOLOGY

## 2023-12-04 NOTE — PROGRESS NOTES
38 year old  at 19w5d weeks presents to the clinic for a routine prenatal visit.  Feeling well.  No vaginal bleeding, leakage of fluid, or contractions   Fundal height=s=d  MHWs=696  Normal anatomy ultrasound with MFM, however some images not well seen.  Repeat ultrasound on .  EIF seen.  History of thrombocytopenia=CBC at next visit  RTC 4 weeks.    Sherly De Dios DO

## 2023-12-05 NOTE — PATIENT INSTRUCTIONS
Please call if you any questions.    [address]   [address]  [phone]        Sherly De Dios,

## 2023-12-08 ENCOUNTER — MYC MEDICAL ADVICE (OUTPATIENT)
Dept: OBGYN | Facility: OTHER | Age: 38
End: 2023-12-08
Payer: COMMERCIAL

## 2023-12-12 ENCOUNTER — TELEPHONE (OUTPATIENT)
Dept: OBGYN | Facility: CLINIC | Age: 38
End: 2023-12-12
Payer: COMMERCIAL

## 2023-12-12 NOTE — TELEPHONE ENCOUNTER
, 20w6d.    Pt started vomiting last night and it has continued to today.  She hadn't been able to keep anything down until about noon today.      Denies fever or signs of dehydration.    Advised to take small frequent sips of fluids (water or sports drinks) to help her stay hydrated.  I also suggested sucking on ice chips or even popsicles.  I suggested holding off on food until she has not vomited for several hours.  If she wanted to try eating she could try bland food like bread, crackers, bananas and try a small amount to see how her belly tolerates it.    If she goes 24 hours without being able to keep anything down and/or she shows signs of dehydration like decreased urine output, listlessness, extreme fatigue, etc then she needs to be further evaluated in the ER for dehydration.    Pt verbalized understanding and agreed to plan.    Lakeisha Mcneal RN

## 2023-12-18 ENCOUNTER — PRENATAL OFFICE VISIT (OUTPATIENT)
Dept: OBGYN | Facility: OTHER | Age: 38
End: 2023-12-18
Payer: COMMERCIAL

## 2023-12-18 VITALS — DIASTOLIC BLOOD PRESSURE: 62 MMHG | SYSTOLIC BLOOD PRESSURE: 96 MMHG | BODY MASS INDEX: 26.31 KG/M2 | WEIGHT: 163 LBS

## 2023-12-18 DIAGNOSIS — Z87.730 HISTORY OF CLEFT PALATE: ICD-10-CM

## 2023-12-18 DIAGNOSIS — Z98.890 H/O LEEP: ICD-10-CM

## 2023-12-18 DIAGNOSIS — N89.8 VAGINAL DISCHARGE DURING PREGNANCY IN SECOND TRIMESTER: ICD-10-CM

## 2023-12-18 DIAGNOSIS — O09.522 MULTIGRAVIDA OF ADVANCED MATERNAL AGE IN SECOND TRIMESTER: Primary | ICD-10-CM

## 2023-12-18 DIAGNOSIS — D69.6 THROMBOCYTOPENIA (H): ICD-10-CM

## 2023-12-18 DIAGNOSIS — O26.892 VAGINAL DISCHARGE DURING PREGNANCY IN SECOND TRIMESTER: ICD-10-CM

## 2023-12-18 LAB
CLUE CELLS: ABNORMAL
TRICHOMONAS, WET PREP: ABNORMAL
WBC'S/HIGH POWER FIELD, WET PREP: ABNORMAL
YEAST, WET PREP: ABNORMAL

## 2023-12-18 PROCEDURE — 99207 PR PRENATAL VISIT: CPT | Performed by: OBSTETRICS & GYNECOLOGY

## 2023-12-18 PROCEDURE — 87210 SMEAR WET MOUNT SALINE/INK: CPT | Performed by: OBSTETRICS & GYNECOLOGY

## 2023-12-18 NOTE — PROGRESS NOTES
38 year old  at 21w5d weeks presents to the clinic for a routine prenatal visit.  No vaginal bleeding or contractions    Patient complains of increased vaginal discharge and odor this week.  No itching.  She was sick last week with vomiting and has noticed an increase in her discharge this week.  She is drinking Pedialyte and feeling somewhat better.  We discussed IVFs at L&D if needed.    Good fetal movement.  Fundal height: 20cm  FHTs: 150's  Vagina=no pooling, no old blood or active bleeding, cervix appears closed, wet prep obtained  History of thrombocytopenia=will check CBC next visit  AMA=weekly BPPs at 36 weeks  Normal anatomy ultrasound but some anatomy not well seen and an EIF seen.  Repeat MFM ultrasound on Friday  RTC 4 weeks    Sherly De Dios DO

## 2023-12-22 ENCOUNTER — OFFICE VISIT (OUTPATIENT)
Dept: MATERNAL FETAL MEDICINE | Facility: CLINIC | Age: 38
End: 2023-12-22
Attending: STUDENT IN AN ORGANIZED HEALTH CARE EDUCATION/TRAINING PROGRAM
Payer: COMMERCIAL

## 2023-12-22 ENCOUNTER — HOSPITAL ENCOUNTER (OUTPATIENT)
Dept: ULTRASOUND IMAGING | Facility: CLINIC | Age: 38
Discharge: HOME | End: 2023-12-22
Attending: STUDENT IN AN ORGANIZED HEALTH CARE EDUCATION/TRAINING PROGRAM
Payer: COMMERCIAL

## 2023-12-22 DIAGNOSIS — O09.522 MULTIGRAVIDA OF ADVANCED MATERNAL AGE IN SECOND TRIMESTER: Primary | ICD-10-CM

## 2023-12-22 DIAGNOSIS — Z36.2 ENCOUNTER FOR FOLLOW-UP ULTRASOUND OF FETAL ANATOMY: ICD-10-CM

## 2023-12-22 PROCEDURE — 99213 OFFICE O/P EST LOW 20 MIN: CPT | Mod: 25 | Performed by: STUDENT IN AN ORGANIZED HEALTH CARE EDUCATION/TRAINING PROGRAM

## 2023-12-22 PROCEDURE — 76816 OB US FOLLOW-UP PER FETUS: CPT

## 2023-12-22 PROCEDURE — 76816 OB US FOLLOW-UP PER FETUS: CPT | Mod: 26 | Performed by: STUDENT IN AN ORGANIZED HEALTH CARE EDUCATION/TRAINING PROGRAM

## 2023-12-22 NOTE — NURSING NOTE
Patient presents to [facility] for RL2 at 22w2d due to suboptimal anatomy. Positive fetal movement. Denies LOF, vaginal bleeding or cramping/contractions. SBAR given to TAYLER MD, see their note in Epic.

## 2023-12-22 NOTE — PROGRESS NOTES
"Please see \"Imaging\" tab under \"Chart Review\" for details of today's visit.    Kristin Nieves    "

## 2023-12-28 ENCOUNTER — TELEPHONE (OUTPATIENT)
Dept: OBGYN | Facility: CLINIC | Age: 38
End: 2023-12-28
Payer: COMMERCIAL

## 2023-12-28 NOTE — TELEPHONE ENCOUNTER
Pt currently 23w1d, , Pt diagnosed with a sinus infection and prescribed Augmentin 875/125mg    Pt wanting to verify with OBGYN provider this is OK to take.    RN routing to provider to review.    Zenaida Betancur RN on 2023 at 5:01 PM

## 2023-12-29 NOTE — TELEPHONE ENCOUNTER
RN called pt and relayed providers advisement.    Patient verbalized understanding and agreed to plan.     Zenaida Betancur RN on 12/29/2023 at 8:25 AM

## 2024-01-03 ENCOUNTER — MYC MEDICAL ADVICE (OUTPATIENT)
Dept: OBGYN | Facility: OTHER | Age: 39
End: 2024-01-03
Payer: COMMERCIAL

## 2024-01-03 DIAGNOSIS — D69.6 THROMBOCYTOPENIA (H): Primary | ICD-10-CM

## 2024-01-03 NOTE — TELEPHONE ENCOUNTER
C/O large bruise on neck & unknown what caused the bruise.  History of thrombocytopenia in pregnancy.  Is being seen in clinic on Monday 1/08/2024 (5 days from today) and having lab work.   Advised, ok to be seen on Monday.  ED precautions given.    Routed to provider for further recommendations.     Anika BROWNE RN

## 2024-01-05 NOTE — TELEPHONE ENCOUNTER
I agree. If patient is concerned, she can come into the lab today for blood work, however I am ok with her waiting until her appointment on Monday.    Sherly De Dios, DO

## 2024-01-08 ENCOUNTER — TELEPHONE (OUTPATIENT)
Dept: OBGYN | Facility: CLINIC | Age: 39
End: 2024-01-08

## 2024-01-08 ENCOUNTER — PRENATAL OFFICE VISIT (OUTPATIENT)
Dept: OBGYN | Facility: OTHER | Age: 39
End: 2024-01-08
Payer: COMMERCIAL

## 2024-01-08 ENCOUNTER — MYC MEDICAL ADVICE (OUTPATIENT)
Dept: OBGYN | Facility: OTHER | Age: 39
End: 2024-01-08

## 2024-01-08 VITALS — DIASTOLIC BLOOD PRESSURE: 63 MMHG | BODY MASS INDEX: 26.6 KG/M2 | WEIGHT: 164.8 LBS | SYSTOLIC BLOOD PRESSURE: 99 MMHG

## 2024-01-08 DIAGNOSIS — D69.6 THROMBOCYTOPENIA (H): ICD-10-CM

## 2024-01-08 DIAGNOSIS — O09.522 MULTIGRAVIDA OF ADVANCED MATERNAL AGE IN SECOND TRIMESTER: Primary | ICD-10-CM

## 2024-01-08 DIAGNOSIS — D50.8 OTHER IRON DEFICIENCY ANEMIA: Primary | ICD-10-CM

## 2024-01-08 DIAGNOSIS — Z87.730 HISTORY OF CLEFT PALATE: ICD-10-CM

## 2024-01-08 PROBLEM — D50.9 IRON DEFICIENCY ANEMIA: Status: ACTIVE | Noted: 2024-01-08

## 2024-01-08 LAB
BASOPHILS # BLD AUTO: 0 10E3/UL (ref 0–0.2)
BASOPHILS NFR BLD AUTO: 0 %
EOSINOPHIL # BLD AUTO: 0.1 10E3/UL (ref 0–0.7)
EOSINOPHIL NFR BLD AUTO: 1 %
ERYTHROCYTE [DISTWIDTH] IN BLOOD BY AUTOMATED COUNT: 12.9 % (ref 10–15)
GLUCOSE 1H P 50 G GLC PO SERPL-MCNC: 96 MG/DL (ref 70–129)
HCT VFR BLD AUTO: 30.7 % (ref 35–47)
HGB BLD-MCNC: 10.4 G/DL (ref 11.7–15.7)
IMM GRANULOCYTES # BLD: 0.1 10E3/UL
IMM GRANULOCYTES NFR BLD: 1 %
LYMPHOCYTES # BLD AUTO: 2.2 10E3/UL (ref 0.8–5.3)
LYMPHOCYTES NFR BLD AUTO: 21 %
MCH RBC QN AUTO: 31.9 PG (ref 26.5–33)
MCHC RBC AUTO-ENTMCNC: 33.9 G/DL (ref 31.5–36.5)
MCV RBC AUTO: 94 FL (ref 78–100)
MONOCYTES # BLD AUTO: 0.7 10E3/UL (ref 0–1.3)
MONOCYTES NFR BLD AUTO: 7 %
NEUTROPHILS # BLD AUTO: 7.4 10E3/UL (ref 1.6–8.3)
NEUTROPHILS NFR BLD AUTO: 70 %
PLATELET # BLD AUTO: 196 10E3/UL (ref 150–450)
RBC # BLD AUTO: 3.26 10E6/UL (ref 3.8–5.2)
T PALLIDUM AB SER QL: NONREACTIVE
WBC # BLD AUTO: 10.6 10E3/UL (ref 4–11)

## 2024-01-08 PROCEDURE — 82950 GLUCOSE TEST: CPT | Performed by: OBSTETRICS & GYNECOLOGY

## 2024-01-08 PROCEDURE — 36415 COLL VENOUS BLD VENIPUNCTURE: CPT | Performed by: OBSTETRICS & GYNECOLOGY

## 2024-01-08 PROCEDURE — 86780 TREPONEMA PALLIDUM: CPT | Performed by: OBSTETRICS & GYNECOLOGY

## 2024-01-08 PROCEDURE — 99207 PR PRENATAL VISIT: CPT | Performed by: OBSTETRICS & GYNECOLOGY

## 2024-01-08 PROCEDURE — 85025 COMPLETE CBC W/AUTO DIFF WBC: CPT | Performed by: OBSTETRICS & GYNECOLOGY

## 2024-01-08 RX ORDER — FERROUS SULFATE 325(65) MG
325 TABLET ORAL
Qty: 90 TABLET | Refills: 1 | Status: SHIPPED | OUTPATIENT
Start: 2024-01-08 | End: 2024-06-03

## 2024-01-08 NOTE — PROGRESS NOTES
38 year old  at 24w5d weeks presents to the clinic for a routine prenatal visit.  Patient is one her last day of antibiotics for a sinus infection.  She is feeling better.  No fevers  No vaginal bleeding, leakage of fluid, or contractions   Good fetal movement.  Fundal height=25cm  FHTs= 148  History of thrombocytopenia=will check CBC today  Normal anatomy ultrasound  AMA=69% on .  Repeat MFM ultrasound at 32 weeks.  Weekly BPPs at 36 weeks.  RTC 4 weeks  GCT today  Blood type:A-  Rhogam next visit    Sherly De Dios DO

## 2024-01-08 NOTE — TELEPHONE ENCOUNTER
Pt has additional questions re: low HGB.  Should she be concerned and could the low HGB be due to pregnancy?    Routed additional questions to provider.

## 2024-01-08 NOTE — TELEPHONE ENCOUNTER
Patient brought FMLA forms to appointment today. Filled out, awaiting Dr. De Dios signature. Once complete, will make copy and put at the  for patient - per request. Will send MyChart once complete.

## 2024-01-08 NOTE — TELEPHONE ENCOUNTER
Please let patient know her anemia is likely due to the pregnancy.  It is common for hemoglobins to drop in pregnancy especially in the second trimester.  We are happy with the hemoglobin at or above 10.5.  Patient's hemoglobin was 10.4 so just very slightly low.  But because it was low I do encourage her to take the iron supplements that I ordered for her.    Sherly De Dios, DO

## 2024-01-14 ENCOUNTER — MYC MEDICAL ADVICE (OUTPATIENT)
Dept: OBGYN | Facility: OTHER | Age: 39
End: 2024-01-14
Payer: COMMERCIAL

## 2024-01-15 NOTE — TELEPHONE ENCOUNTER
", 25w5d.    Pt states she attempted to call [facility] to schedule an US but they stated they don't have a referral.    Per 24 prenatal visit:  \"AMA=69% on . Repeat MFM ultrasound at 32 weeks. Weekly BPPs at 36 weeks\"     Routing to Dr. De Dios to place an [facility] referral for pt to have an US done at 32 weeks.    Lakeisha Mcneal RN    "

## 2024-01-16 ENCOUNTER — TRANSCRIBE ORDERS (OUTPATIENT)
Dept: MATERNAL FETAL MEDICINE | Facility: CLINIC | Age: 39
End: 2024-01-16
Payer: COMMERCIAL

## 2024-01-16 DIAGNOSIS — O09.523 MULTIGRAVIDA OF ADVANCED MATERNAL AGE IN THIRD TRIMESTER: Primary | ICD-10-CM

## 2024-01-16 DIAGNOSIS — O26.90 PREGNANCY RELATED CONDITION, ANTEPARTUM: Primary | ICD-10-CM

## 2024-01-16 DIAGNOSIS — O09.522 MULTIGRAVIDA OF ADVANCED MATERNAL AGE IN SECOND TRIMESTER: ICD-10-CM

## 2024-01-16 NOTE — PROGRESS NOTES
Please call patient and let her know I placed the order for the ultrasound.    Sherly De Dios, DO

## 2024-02-08 ENCOUNTER — PRENATAL OFFICE VISIT (OUTPATIENT)
Dept: OBGYN | Facility: OTHER | Age: 39
End: 2024-02-08
Payer: COMMERCIAL

## 2024-02-08 VITALS — DIASTOLIC BLOOD PRESSURE: 64 MMHG | WEIGHT: 167 LBS | SYSTOLIC BLOOD PRESSURE: 113 MMHG | BODY MASS INDEX: 26.95 KG/M2

## 2024-02-08 DIAGNOSIS — Z23 NEED FOR VACCINATION: ICD-10-CM

## 2024-02-08 DIAGNOSIS — O26.899 RH NEGATIVE STATE IN ANTEPARTUM PERIOD: ICD-10-CM

## 2024-02-08 DIAGNOSIS — O99.013 ANEMIA DURING PREGNANCY IN THIRD TRIMESTER: Primary | ICD-10-CM

## 2024-02-08 DIAGNOSIS — Z67.91 RH NEGATIVE STATE IN ANTEPARTUM PERIOD: ICD-10-CM

## 2024-02-08 DIAGNOSIS — O09.522 MULTIGRAVIDA OF ADVANCED MATERNAL AGE IN SECOND TRIMESTER: ICD-10-CM

## 2024-02-08 LAB
ERYTHROCYTE [DISTWIDTH] IN BLOOD BY AUTOMATED COUNT: 13.1 % (ref 10–15)
HCT VFR BLD AUTO: 32.9 % (ref 35–47)
HGB BLD-MCNC: 11.1 G/DL (ref 11.7–15.7)
MCH RBC QN AUTO: 32.4 PG (ref 26.5–33)
MCHC RBC AUTO-ENTMCNC: 33.7 G/DL (ref 31.5–36.5)
MCV RBC AUTO: 96 FL (ref 78–100)
PLATELET # BLD AUTO: 167 10E3/UL (ref 150–450)
RBC # BLD AUTO: 3.43 10E6/UL (ref 3.8–5.2)
WBC # BLD AUTO: 9.5 10E3/UL (ref 4–11)

## 2024-02-08 PROCEDURE — 99207 PR PRENATAL VISIT: CPT | Performed by: OBSTETRICS & GYNECOLOGY

## 2024-02-08 PROCEDURE — 85027 COMPLETE CBC AUTOMATED: CPT | Performed by: OBSTETRICS & GYNECOLOGY

## 2024-02-08 PROCEDURE — 36415 COLL VENOUS BLD VENIPUNCTURE: CPT | Performed by: OBSTETRICS & GYNECOLOGY

## 2024-02-08 PROCEDURE — 90471 IMMUNIZATION ADMIN: CPT | Performed by: OBSTETRICS & GYNECOLOGY

## 2024-02-08 PROCEDURE — 90715 TDAP VACCINE 7 YRS/> IM: CPT | Performed by: OBSTETRICS & GYNECOLOGY

## 2024-02-08 PROCEDURE — 96372 THER/PROPH/DIAG INJ SC/IM: CPT | Performed by: OBSTETRICS & GYNECOLOGY

## 2024-02-08 NOTE — PATIENT INSTRUCTIONS
If you have labs or imaging done, the results will automatically release in [employer] without an interpretation.  Your health care professional will review those results and send an interpretation with recommendations as soon as possible, but this may be 1-3 business days.    If you have any questions regarding your visit, please contact your care team.     [employer] Access Services: [phone]  [facility] CLINIC HOURS TELEPHONE NUMBER       MD Nicole Arnold - Certified Medical Assistant     Zenaida- LEAD RN  Lakeisha-NIGEL Donaldson-NIGEL Chandler-  Maria Teresa-     Monday- [place]  8:00 a.m - 5:00 p.m    Tuesday- Surgery        Thursday- [place]  8:00 a.m - 5:00 p.m.    Friday- [place]  7:30 a.m - 4:00 p.m. [facility]  [address]  [place], [place] [address] [phone] Fax  [phone] Phone  Imaging Scheduling [phone]    [facility] Labor and Delivery  [address] [place] [address] Dr.  [place], [place] [address] [phone]    [facility]  [address], [place] [id number] [phone] Phone  [phone] Fax  Imaging Scheduling [phone]     Urgent Care locations:  [facility] Monday-Friday  10 am - 8 pm  Saturday and Sunday   9 am - 5 pm  Monday-Friday   10 am - 8 pm  Saturday and Sunday   9 am - 5 pm   [phone]     **Surgeries** Our Surgery Schedulers will contact you to schedule. If you do not receive a call within 3 business days, please call [phone].    If you need a medication refill, please contact your pharmacy. Please allow 3 business days for your refill to be completed.    As always, thank you for trusting us with your healthcare needs!

## 2024-02-08 NOTE — PROGRESS NOTES
Presents for routine  appointment.     No  leaking fluid , no contractions , no vaginal bleeding    ROS:   and GI  negative.     Please see Prenatal Vitals and Notes Flowsheet for objective data.  Hemoglobin   Date Value Ref Range Status   2024 11.1 (L) 11.7 - 15.7 g/dL Final   2021 12.5 11.7 - 15.7 g/dL Final       A/P:  39 year old  at 29w1d       ICD-10-CM    1. Anemia during pregnancy in third trimester  O99.013 CBC with platelets     CANCELED: CBC with platelets      2. Need for vaccination  Z23       3. Rh negative state in antepartum period  O26.899 rho(D) immune globulin (RHOGAM) injection 300 mcg    Z67.91       4. Multigravida of advanced maternal age in second trimester  O09.522 CBC with platelets          GCT Normal  TDAP today.    Rhogam: needed  Discussed kick counts  Anemia in pregnancy - continue oral iron.  Labs today  [facility] .  Weekly BPPs at 36 weeks.   Follow up in 2 weeks.      Chelsey French MD

## 2024-02-23 ENCOUNTER — PRENATAL OFFICE VISIT (OUTPATIENT)
Dept: OBGYN | Facility: OTHER | Age: 39
End: 2024-02-23
Payer: COMMERCIAL

## 2024-02-23 VITALS — SYSTOLIC BLOOD PRESSURE: 107 MMHG | WEIGHT: 168.7 LBS | BODY MASS INDEX: 27.23 KG/M2 | DIASTOLIC BLOOD PRESSURE: 70 MMHG

## 2024-02-23 DIAGNOSIS — O09.523 MULTIGRAVIDA OF ADVANCED MATERNAL AGE IN THIRD TRIMESTER: Primary | ICD-10-CM

## 2024-02-23 DIAGNOSIS — D50.8 OTHER IRON DEFICIENCY ANEMIA: ICD-10-CM

## 2024-02-23 DIAGNOSIS — D69.6 THROMBOCYTOPENIA (H): ICD-10-CM

## 2024-02-23 DIAGNOSIS — Z98.890 H/O LEEP: ICD-10-CM

## 2024-02-23 PROCEDURE — 99207 PR PRENATAL VISIT: CPT | Performed by: OBSTETRICS & GYNECOLOGY

## 2024-02-23 NOTE — PROGRESS NOTES
39 year old  at 31w2d weeks presents to the clinic for a routine prenatal visit.  No concerns. Patient denies any vaginal bleeding, leakage of fluid, or contractions   Good fetal movement.    Fundal height=30cm  ICFq=026's  GDS=96  Hgb=11.1  Thrombocytopenia=2/8=Plts 167  AMA=growth US with MFM next week.  Weekly BPPs at 36 wks  RTC 2 weeks    Sherly De Dios DO

## 2024-02-25 ENCOUNTER — HEALTH MAINTENANCE LETTER (OUTPATIENT)
Age: 39
End: 2024-02-25

## 2024-02-29 ENCOUNTER — HOSPITAL ENCOUNTER (OUTPATIENT)
Dept: ULTRASOUND IMAGING | Facility: CLINIC | Age: 39
Discharge: HOME | End: 2024-02-29
Attending: OBSTETRICS & GYNECOLOGY
Payer: COMMERCIAL

## 2024-02-29 ENCOUNTER — OFFICE VISIT (OUTPATIENT)
Dept: MATERNAL FETAL MEDICINE | Facility: CLINIC | Age: 39
End: 2024-02-29
Attending: OBSTETRICS & GYNECOLOGY
Payer: COMMERCIAL

## 2024-02-29 DIAGNOSIS — O26.90 PREGNANCY RELATED CONDITION, ANTEPARTUM: ICD-10-CM

## 2024-02-29 DIAGNOSIS — O09.523 MULTIGRAVIDA OF ADVANCED MATERNAL AGE IN THIRD TRIMESTER: Primary | ICD-10-CM

## 2024-02-29 PROCEDURE — 76816 OB US FOLLOW-UP PER FETUS: CPT

## 2024-02-29 PROCEDURE — 76816 OB US FOLLOW-UP PER FETUS: CPT | Mod: 26 | Performed by: OBSTETRICS & GYNECOLOGY

## 2024-02-29 NOTE — NURSING NOTE
Patient presents to [facility] for RL2 at 32w1d due to AMA, growth. Positive fetal movement. Denies LOF, vaginal bleeding or cramping/contractions. SBAR given to TAYLER MD, see their note in Epic.

## 2024-03-04 ENCOUNTER — MYC MEDICAL ADVICE (OUTPATIENT)
Dept: OBGYN | Facility: OTHER | Age: 39
End: 2024-03-04
Payer: COMMERCIAL

## 2024-03-05 ENCOUNTER — MYC MEDICAL ADVICE (OUTPATIENT)
Dept: OBGYN | Facility: OTHER | Age: 39
End: 2024-03-05
Payer: COMMERCIAL

## 2024-03-05 DIAGNOSIS — D18.03 LIVER HEMANGIOMA: Primary | ICD-10-CM

## 2024-03-05 NOTE — TELEPHONE ENCOUNTER
, 32w6d.    Pt was seen in the ER yesterday, 3/4, for rib pain.  They did a CT scan on her and saw a 4 cm mass on her liver.  They advised that she have her primary provider order an US of her liver for further assessment.      Pt is writing in requesting that Dr. De Dios order the US of her liver for her since she does not have a primary provider.    Pended order and routing to Dr. De Dios to see if she would sign US order.  If so, please make sure I pended the correct order.    Lakeisha Mcneal, RN

## 2024-03-05 NOTE — PATIENT INSTRUCTIONS
If you have labs or imaging done, the results will automatically release in [employer] without an interpretation.  Your health care professional will review those results and send an interpretation with recommendations as soon as possible, but this may be 1-3 business days.    If you have any questions regarding your visit, please contact your care team.     [employer] Access Services: [phone]  [facility] CLINIC HOURS TELEPHONE NUMBER       MD Nicole Arnold - Certified Medical Assistant     Zenaida- LEAD RN  Lakeisha-NIGEL Donaldson-NIGEL Chandler-  Maria Teresa-     Monday- [place]  8:00 a.m - 5:00 p.m    Tuesday- Surgery        Thursday- [place]  8:00 a.m - 5:00 p.m.    Friday- [place]  7:30 a.m - 4:00 p.m. [facility]  [address]  [place], [place] [address] [phone] Fax  [phone] Phone  Imaging Scheduling [phone]    [facility] Delivery  [address] [place] [address] Dr.  [place], [place] [address] [phone]    [facility]  [address] [place], [place] [id number] [phone] Phone  [phone] Fax  Imaging Scheduling [phone]     Urgent Care locations:  [facility] Monday-Friday  10 am - 8 pm  Saturday and Sunday   9 am - 5 pm  Monday-Friday   10 am - 8 pm  Saturday and Sunday   9 am - 5 pm   [phone]     **Surgeries** Our Surgery Schedulers will contact you to schedule. If you do not receive a call within 3 business days, please call [phone].    If you need a medication refill, please contact your pharmacy. Please allow 3 business days for your refill to be completed.    As always, thank you for trusting us with your healthcare needs!

## 2024-03-05 NOTE — TELEPHONE ENCOUNTER
Please let patient know I signed the order.  I am not sure if they will do it in pregnancy or be able to see what they need to see.    Sherly De Dios, DO

## 2024-03-06 NOTE — TELEPHONE ENCOUNTER
, 33w0d.    Pt has a prenatal visit on 3/7 with Dr. French.  She is asking if she can still come to this appt as she recently tested positive for Influenza B.    Pt's symptoms started on Saturday, 3/2.  She tested positive on Monday, 3/4.    Current symptoms are congestion.    Denies fever, shortness of breath.    Advised to get rest, drink  oz of fluids and monitor for fever and shortness of breath/difficulty breathing.    I suggested if she does not have a fever and wears a mask at her appt on Friday it should be fine to keep it.  However, I will also clarify with Dr. French.    Routing to Dr. French to approve if pt is able to keep her prenatal visit on 3/7.    Lakeisha Mcneal RN

## 2024-03-07 ENCOUNTER — PRENATAL OFFICE VISIT (OUTPATIENT)
Dept: OBGYN | Facility: OTHER | Age: 39
End: 2024-03-07
Payer: COMMERCIAL

## 2024-03-07 ENCOUNTER — ANCILLARY PROCEDURE (OUTPATIENT)
Dept: ULTRASOUND IMAGING | Facility: CLINIC | Age: 39
End: 2024-03-07
Attending: OBSTETRICS & GYNECOLOGY
Payer: COMMERCIAL

## 2024-03-07 VITALS
HEART RATE: 90 BPM | BODY MASS INDEX: 26.73 KG/M2 | WEIGHT: 165.6 LBS | SYSTOLIC BLOOD PRESSURE: 105 MMHG | OXYGEN SATURATION: 99 % | DIASTOLIC BLOOD PRESSURE: 74 MMHG

## 2024-03-07 DIAGNOSIS — D18.03 LIVER HEMANGIOMA: ICD-10-CM

## 2024-03-07 DIAGNOSIS — O09.523 MULTIGRAVIDA OF ADVANCED MATERNAL AGE IN THIRD TRIMESTER: Primary | ICD-10-CM

## 2024-03-07 PROCEDURE — 99207 PR PRENATAL VISIT: CPT | Performed by: OBSTETRICS & GYNECOLOGY

## 2024-03-07 PROCEDURE — 76705 ECHO EXAM OF ABDOMEN: CPT | Mod: GC | Performed by: RADIOLOGY

## 2024-03-07 RX ORDER — TERBINAFINE HYDROCHLORIDE 250 MG/1
250 TABLET ORAL
COMMUNITY
End: 2024-03-22

## 2024-03-07 NOTE — PROGRESS NOTES
Presents for routine  appointment.     Influenza B  No leaking fluid, no contractions, no vaginal bleeding   ROS:   and GI negative.     Please see Prenatal Vitals and Notes Flowsheet for objective data.  Hemoglobin   Date Value Ref Range Status   2024 11.1 (L) 11.7 - 15.7 g/dL Final   2021 12.5 11.7 - 15.7 g/dL Final       A/P:  39 year old  at 33w1d       ICD-10-CM    1. Multigravida of advanced maternal age in third trimester  O09.523           Tdap given at previous visit  Discussed supportive care for influena  Liver US pending.  Follow up in 2 weeks.      Chelsey French MD

## 2024-03-10 ENCOUNTER — MYC MEDICAL ADVICE (OUTPATIENT)
Dept: OBGYN | Facility: OTHER | Age: 39
End: 2024-03-10
Payer: COMMERCIAL

## 2024-03-11 ENCOUNTER — TELEPHONE (OUTPATIENT)
Dept: OBGYN | Facility: CLINIC | Age: 39
End: 2024-03-11

## 2024-03-11 NOTE — TELEPHONE ENCOUNTER
Pt last seen 3/7/2024, currently 33w5d    Pt asking for GI referral and if she should f/up with GI before or after baby arrives for 4cm mass seen on liver from CT and US in ED on 3/4/2024.    RN routing to provider to advise.    Zenaida Betancur RN on 3/11/2024 at 8:05 AM

## 2024-03-11 NOTE — TELEPHONE ENCOUNTER
, 33w5d.    Pt was diagnosed with influenza B last week.  She is concerned that she still has symptoms and is wondering if this is normal.    She still has congestion.    I mentioned to pt that influenza is a viral illness and can take a few weeks to run it's course.  In the meantime she should make sure she is drinking lots of clear fluids, blow nose often, use a humidifier at night,  steamy bathroom to help loosen congestion and blow out.    If she develops fever, facial pain, shortness of breath, symptoms worsening then she needs to be further evaluated by FP.    Pt verbalized understanding and agreed to plan.    Lakeisha Mcneal RN

## 2024-03-11 NOTE — TELEPHONE ENCOUNTER
She can be seen whenever for GI but they most likely will want to see her postpartum.    Sherly De Dios, DO

## 2024-03-22 ENCOUNTER — PRENATAL OFFICE VISIT (OUTPATIENT)
Dept: OBGYN | Facility: OTHER | Age: 39
End: 2024-03-22
Payer: COMMERCIAL

## 2024-03-22 VITALS — SYSTOLIC BLOOD PRESSURE: 117 MMHG | DIASTOLIC BLOOD PRESSURE: 75 MMHG | WEIGHT: 168.2 LBS | BODY MASS INDEX: 27.15 KG/M2

## 2024-03-22 DIAGNOSIS — Z98.890 H/O LEEP: ICD-10-CM

## 2024-03-22 DIAGNOSIS — D69.6 THROMBOCYTOPENIA (H): ICD-10-CM

## 2024-03-22 DIAGNOSIS — O09.523 MULTIGRAVIDA OF ADVANCED MATERNAL AGE IN THIRD TRIMESTER: Primary | ICD-10-CM

## 2024-03-22 DIAGNOSIS — Z87.730 HISTORY OF CLEFT PALATE: ICD-10-CM

## 2024-03-22 DIAGNOSIS — D18.03 LIVER HEMANGIOMA: ICD-10-CM

## 2024-03-22 PROCEDURE — 99207 PR PRENATAL VISIT: CPT | Performed by: OBSTETRICS & GYNECOLOGY

## 2024-03-22 NOTE — PROGRESS NOTES
39 year old  at 35w2d weeks presents to the clinic for a routine prenatal visit.    Patient is getting over influenza.  Her signs and symptoms are improving  Patient denies any vaginal bleeding, leakage of fluid, or contractions     Good fetal movement  Fundal height=35cm  ZJXs=765  AMA=weekly testing at 36wks.  Last growth US=61% at 32wks  Thrombocytopenia=will check plts at 36wks  Suspected liver hemangioma=referral for GI placed.  Discussed labor precautions.  RTC 1 week    Sherly De Dios DO

## 2024-03-23 PROBLEM — D18.03 LIVER HEMANGIOMA: Status: ACTIVE | Noted: 2024-03-23

## 2024-03-27 ENCOUNTER — MYC MEDICAL ADVICE (OUTPATIENT)
Dept: OBGYN | Facility: OTHER | Age: 39
End: 2024-03-27
Payer: COMMERCIAL

## 2024-04-01 ENCOUNTER — PRENATAL OFFICE VISIT (OUTPATIENT)
Dept: OBGYN | Facility: OTHER | Age: 39
End: 2024-04-01
Payer: COMMERCIAL

## 2024-04-01 ENCOUNTER — ANCILLARY PROCEDURE (OUTPATIENT)
Dept: ULTRASOUND IMAGING | Facility: OTHER | Age: 39
End: 2024-04-01
Attending: OBSTETRICS & GYNECOLOGY
Payer: COMMERCIAL

## 2024-04-01 VITALS — DIASTOLIC BLOOD PRESSURE: 77 MMHG | WEIGHT: 170.4 LBS | BODY MASS INDEX: 27.5 KG/M2 | SYSTOLIC BLOOD PRESSURE: 115 MMHG

## 2024-04-01 DIAGNOSIS — D69.6 THROMBOCYTOPENIA (H): ICD-10-CM

## 2024-04-01 DIAGNOSIS — D18.03 LIVER HEMANGIOMA: ICD-10-CM

## 2024-04-01 DIAGNOSIS — O36.8130 DECREASED FETAL MOVEMENTS IN THIRD TRIMESTER, SINGLE OR UNSPECIFIED FETUS: ICD-10-CM

## 2024-04-01 DIAGNOSIS — Z87.730 HISTORY OF CLEFT PALATE: ICD-10-CM

## 2024-04-01 DIAGNOSIS — D50.8 OTHER IRON DEFICIENCY ANEMIA: ICD-10-CM

## 2024-04-01 DIAGNOSIS — Z98.890 H/O LEEP: ICD-10-CM

## 2024-04-01 DIAGNOSIS — O09.522 MULTIGRAVIDA OF ADVANCED MATERNAL AGE IN SECOND TRIMESTER: ICD-10-CM

## 2024-04-01 DIAGNOSIS — O09.523 MULTIGRAVIDA OF ADVANCED MATERNAL AGE IN THIRD TRIMESTER: Primary | ICD-10-CM

## 2024-04-01 LAB
ALBUMIN MFR UR ELPH: 6.1 MG/DL
ALT SERPL W P-5'-P-CCNC: 32 U/L (ref 0–50)
AST SERPL W P-5'-P-CCNC: 35 U/L (ref 0–45)
CREAT SERPL-MCNC: 0.62 MG/DL (ref 0.51–0.95)
CREAT UR-MCNC: 47.3 MG/DL
EGFRCR SERPLBLD CKD-EPI 2021: >90 ML/MIN/1.73M2
ERYTHROCYTE [DISTWIDTH] IN BLOOD BY AUTOMATED COUNT: 13.5 % (ref 10–15)
HCT VFR BLD AUTO: 36.9 % (ref 35–47)
HGB BLD-MCNC: 12.4 G/DL (ref 11.7–15.7)
MCH RBC QN AUTO: 31.4 PG (ref 26.5–33)
MCHC RBC AUTO-ENTMCNC: 33.6 G/DL (ref 31.5–36.5)
MCV RBC AUTO: 93 FL (ref 78–100)
PLATELET # BLD AUTO: 153 10E3/UL (ref 150–450)
PROT/CREAT 24H UR: 0.13 MG/MG CR (ref 0–0.2)
RBC # BLD AUTO: 3.95 10E6/UL (ref 3.8–5.2)
WBC # BLD AUTO: 9.3 10E3/UL (ref 4–11)

## 2024-04-01 PROCEDURE — 59025 FETAL NON-STRESS TEST: CPT | Mod: 59 | Performed by: OBSTETRICS & GYNECOLOGY

## 2024-04-01 PROCEDURE — 87653 STREP B DNA AMP PROBE: CPT | Performed by: OBSTETRICS & GYNECOLOGY

## 2024-04-01 PROCEDURE — 99213 OFFICE O/P EST LOW 20 MIN: CPT | Mod: 25 | Performed by: OBSTETRICS & GYNECOLOGY

## 2024-04-01 PROCEDURE — 85027 COMPLETE CBC AUTOMATED: CPT | Performed by: OBSTETRICS & GYNECOLOGY

## 2024-04-01 PROCEDURE — 99207 PR PRENATAL VISIT: CPT | Performed by: OBSTETRICS & GYNECOLOGY

## 2024-04-01 PROCEDURE — 76819 FETAL BIOPHYS PROFIL W/O NST: CPT | Mod: TC | Performed by: RADIOLOGY

## 2024-04-01 PROCEDURE — 84460 ALANINE AMINO (ALT) (SGPT): CPT | Performed by: OBSTETRICS & GYNECOLOGY

## 2024-04-01 PROCEDURE — 36415 COLL VENOUS BLD VENIPUNCTURE: CPT | Performed by: OBSTETRICS & GYNECOLOGY

## 2024-04-01 PROCEDURE — 82565 ASSAY OF CREATININE: CPT | Performed by: OBSTETRICS & GYNECOLOGY

## 2024-04-01 PROCEDURE — 84450 TRANSFERASE (AST) (SGOT): CPT | Performed by: OBSTETRICS & GYNECOLOGY

## 2024-04-01 PROCEDURE — 84156 ASSAY OF PROTEIN URINE: CPT | Performed by: OBSTETRICS & GYNECOLOGY

## 2024-04-01 NOTE — PROGRESS NOTES
39 year old  at 36w5d weeks presents to the clinic for a routine prenatal visit.  Patient had the GI bug last week along with her family members.  She has felt movement, however it has been less than the normal.    AMA=weekly BPPs= today  Complains of feeling more pressure.  No vaginal bleeding, leakage of fluid, or contractions   Increased swelling in her lower extremities=PIH labs today  Thrombocytopenia=CBC today  Fundal height=38cm  GFDc=172's by NST  CX=3  Breech by BPP today.  We discussed ECV vs c section.  Will check position next week  Liver hemangioma=follow up with GI  Discussed labor precautions  Anemia=patient is taking additional iron supplements  RTC 1 week  GBS=done today      Decreased Fetal Movement  NST IS:  Reactive (2 accl > 15 BPM in 20 min., each lasting approx. 15 seconds)  NST Baseline Rate 120's  Variability:  Average  Accelerations:Present  Variable Decelerations:No  Other Decelerations:No  Contractions: None      Sherly De Dios,

## 2024-04-02 LAB — GP B STREP DNA SPEC QL NAA+PROBE: NEGATIVE

## 2024-04-08 ENCOUNTER — PRENATAL OFFICE VISIT (OUTPATIENT)
Dept: OBGYN | Facility: OTHER | Age: 39
End: 2024-04-08
Payer: COMMERCIAL

## 2024-04-08 ENCOUNTER — ANCILLARY PROCEDURE (OUTPATIENT)
Dept: ULTRASOUND IMAGING | Facility: OTHER | Age: 39
End: 2024-04-08
Attending: OBSTETRICS & GYNECOLOGY
Payer: COMMERCIAL

## 2024-04-08 ENCOUNTER — MYC MEDICAL ADVICE (OUTPATIENT)
Dept: OBGYN | Facility: CLINIC | Age: 39
End: 2024-04-08

## 2024-04-08 VITALS — WEIGHT: 168 LBS | BODY MASS INDEX: 27.12 KG/M2 | SYSTOLIC BLOOD PRESSURE: 128 MMHG | DIASTOLIC BLOOD PRESSURE: 73 MMHG

## 2024-04-08 DIAGNOSIS — Z98.890 H/O LEEP: ICD-10-CM

## 2024-04-08 DIAGNOSIS — Z87.730 HISTORY OF CLEFT PALATE: ICD-10-CM

## 2024-04-08 DIAGNOSIS — O09.522 MULTIGRAVIDA OF ADVANCED MATERNAL AGE IN SECOND TRIMESTER: ICD-10-CM

## 2024-04-08 DIAGNOSIS — D69.6 THROMBOCYTOPENIA (H): ICD-10-CM

## 2024-04-08 DIAGNOSIS — Z67.91 RH NEGATIVE STATE IN ANTEPARTUM PERIOD: ICD-10-CM

## 2024-04-08 DIAGNOSIS — O26.899 RH NEGATIVE STATE IN ANTEPARTUM PERIOD: ICD-10-CM

## 2024-04-08 DIAGNOSIS — O09.523 MULTIGRAVIDA OF ADVANCED MATERNAL AGE IN THIRD TRIMESTER: Primary | ICD-10-CM

## 2024-04-08 PROCEDURE — 99207 PR PRENATAL VISIT: CPT | Performed by: OBSTETRICS & GYNECOLOGY

## 2024-04-08 PROCEDURE — 76819 FETAL BIOPHYS PROFIL W/O NST: CPT | Mod: TC | Performed by: RADIOLOGY

## 2024-04-08 NOTE — PROGRESS NOTES
Discussed with Ethan the risks, benefits and alternatives to induction.  We discussed cervical ripening for those patients with a mckeon score of less than 6 (for multiparous) and 8 (for nulliparous).  Discussed the concerns related to uterine hyperstimulation and adverse fetal effects that can occur with a ripening agent or pitocin. Discussed amniotomy and the rationale for it with induction.  I discussed the expected timeline for her based on her presentation, but she understands that this is just an approximate.  She is given the opportunity to ask questions and have them answered.  She does wish to proceed    Induction scheduled for April 16.    Sherly De Dios, DO

## 2024-04-08 NOTE — PROGRESS NOTES
39 year old  at 37w5d weeks presents to the clinic for a routine prenatal visit.  Patient felt some fluid out last night.  She said it was clear.    Complains of feeling more pressure.  No vaginal bleeding or regular contractions   Fundal height=38cm  KQEv=513's  CX=/-3  Does not appear to be breech anymore  Discussed labor precautions  Vagina= normal-appearing physiologic discharge, no pooling, no old blood, no active bleeding  Anemia=patient is taking additional iron supplements  AMA=BPP to follow this appointment   Kxyjybnpekflsyld=382 on 2024  Liver hemangioma=follow up with pp  RTC 1 week  GBS=Negative    Sherly De Dios DO

## 2024-04-09 NOTE — TELEPHONE ENCOUNTER
Please let patient know it depends on what the on-call doctor wants to use for her induction.  Likely it will be a Cervidil or Cytotec which are prostaglandins.  Patient does not need a formal ultrasound at her next appointment.  Her cervix will be checked to confirm baby is headdown and if unable to determine that a bedside ultrasound will be performed.    Sherly De Dios, DO

## 2024-04-09 NOTE — TELEPHONE ENCOUNTER
, 37w6d.    Dr. De Dios sent a message to pt letting her know her induction is scheduled for .    Pt wants to know what medication she will be given for induction.  She is also wanting to confirm that she only needs a prenatal visit on 4/15 and not an ultrasound.    Routing to Dr. De Dios to answer pt's questions.    Lakeisha Mcneal RN

## 2024-04-12 NOTE — PROGRESS NOTES
Subjective:  38w2d  Feels good  Fetal movement: positive  Denies vaginal bleeding/lof, no contractions, denies any new onset headaches, visual changes/disturbances, or persistent RUQ or epigastric pain. Feeling kicks in the R upper side. She was previously feeling central downward pressure but now feels more pressure on her right lower side.    Fetal kick/movement counts reviewed  Labor signs and symptoms discussed, aware of numbers to call  Discussed warning signs, signs and symptoms preeclampsia    Objective:   LMP 07/19/2023   Well developed, well nourished, no acute distress  Non-labored breathing  Abdomen soft, non-tender to palpation  FH: consistent with dates at 38cm  FHT: 130s  BSUS shows oblique position at this time. Physician colleague also helped assess with BSUS and confirmed. She will call for induction at 5PM tomorrow and will have ECV done prior to induction at hospital.    Assessment/Plan:  GBS negative  Continue daily PNV  Pre-eclampsia, labor, bleeding, kick counts, and return precautions reviewed  Induction scheduled for 4/16/24. ECV at hospital prior.    VALERIA Rosa CNP

## 2024-04-12 NOTE — PATIENT INSTRUCTIONS
Week 38 of Your Pregnancy: Care Instructions  Believe it or not, your baby is almost here. You may notice how your baby responds to sounds, warmth, cold, and light. You may even know what kind of music your baby likes.    Even if you expect a vaginal birth, it's a good idea to learn about  section ().  is the delivery of a baby through a cut (incision) in your belly. It's a major surgery, so it has more risks than a vaginal birth.    During the first 2 weeks after the birth, limit visitors. Don't allow visitors who have colds or infections. Ask visitors to wash their hands before they touch your baby. And never let anyone smoke around your baby.    Know about unplanned C-sections.  Reasons for an unplanned  include labor that slows or stops, signs of distress in your baby, and high blood pressure or other problems for you.     Know about planned C-sections.  If your baby isn't in a head-down position for delivery (breech position), your doctor may plan a . Or you may have a planned  if you're having twins or more.     Get as much help as you can while you're in the hospital.  You can learn about feeding, diapering, and bathing your baby.     Talk to your doctor or midwife about how to care for the umbilical cord stump.  If your baby will be circumcised, also ask about how to care for that.     Ask friends or family for help, as you need it.  If you can, have another adult in your home for at least 2 or 3 days after the birth.     Try to nap when your baby naps.  This may be your best chance to get some sleep.     Watch for changes in your mental health.  For the first 1 to 2 weeks after birth, it's common to cry or feel sad or irritable. If these feelings last for more than 2 weeks, you may have postpartum depression. Ask your doctor for help. Postpartum depression can be treated.   Follow-up care is a key part of your treatment and safety. Be sure to make and go  "to all appointments, and call your doctor if you are having problems. It's also a good idea to know your test results and keep a list of the medicines you take.  Where can you learn more?  Go to https://www.[employer].net/patiented  Enter B044 in the search box to learn more about \"Week 38 of Your Pregnancy: Care Instructions.\"  Current as of: July 10, 2023               Content Version: 14.0    7092-5319 [employer].   Care instructions adapted under license by your healthcare professional. If you have questions about a medical condition or this instruction, always ask your healthcare professional. [employer] disclaims any warranty or liability for your use of this information.      Week 39 of Your Pregnancy: Care Instructions    Ames babies can look different than what you see in pictures or movies. Their heads can be a strange shape right after birth. And they may have swollen eyes and red marks on their faces.    You can still get pregnant even if you are breastfeeding. If you don't want to get pregnant, talk to your doctor about birth control.  Tips for week 39 of pregnancy  If you plan to breastfeed, get prepared.     Continue to eat healthy foods.  Keep taking your prenatal vitamins during breastfeeding if your doctor recommends it.  Talk to your doctor before taking any medicines or supplements.  Choose the right birth control for you.     Intrauterine devices (IUDs) are placed in the uterus. Sometimes the IUD can be placed right after giving birth. They work for years.  Hormonal implants are placed under the skin of the arm. They also work for years.  Depo-Provera is a shot. You get it every 3 months.  Birth control pills can be used. They're taken every day.  Tubal ligation (tying your tubes) and vasectomy are surgeries. They're permanent.  Diaphragms, spermicide, and condoms must be used each time you have sex. If you used a diaphragm before, you should get refitted after the " "baby is born.  A birth control patch or ring can be used. These just can't be started until several weeks after you give birth.  Follow-up care is a key part of your treatment and safety. Be sure to make and go to all appointments, and call your doctor if you are having problems. It's also a good idea to know your test results and keep a list of the medicines you take.  Where can you learn more?  Go to https://www.[employer].net/patiented  Enter A811 in the search box to learn more about \"Week 39 of Your Pregnancy: Care Instructions.\"  Current as of: July 10, 2023               Content Version: 14.0    8550-6101 [employer]   Care instructions adapted under license by your healthcare professional. If you have questions about a medical condition or this instruction, always ask your healthcare professional. [employer] disclaims any warranty or liability for your use of this information.                                                        If you have labs or imaging done, the results will automatically release in [employer] without an interpretation.  Your health care professional will review those results and send an interpretation with recommendations as soon as possible, but this may be 1-3 business days.    If you have any questions regarding your visit, please contact your care team.     [employer] Access Services: [phone]  [facility] CLINIC HOURS TELEPHONE NUMBER   Adalgisa LIANA Paredes-NIGEL Suazo-NIGEL Chandler-Surgery Scheduler  Maria Teresa-       [facility]  8:00 am-4:00 pm    [facility]  [facility]:00 am-4:00 pm    Wednesday[facility] 8:00 am-4:00 pm    Thursday[facility] 8:00 am-4:00 pm    Friday[facility]  8:00 am-4:00 pm [facility]  [address] [place] [address]  PH: [phone]  Fax: [phone]    Imaging Scheduling all locations  PH: [phone]     [facility] and " [facility]  [address] [place] [address]  [phone]    [facility]  [address] [place] [address]  PH: [phone]     **Surgeries** Our Surgery Schedulers will contact you to schedule. If you do not receive a call within 3 business days, please call [phone].  Urgent Care locations:  [facility]       Monday-Friday   10 am - 8 pm    Saturday and Sunday   9 am - 5 pm   [phone]   If you need a medication refill, please contact your pharmacy. Please allow 3 business days for your refill to be completed.  As always, Thank you for trusting us with your healthcare needs!

## 2024-04-14 ENCOUNTER — NURSE TRIAGE (OUTPATIENT)
Dept: NURSING | Facility: CLINIC | Age: 39
End: 2024-04-14
Payer: COMMERCIAL

## 2024-04-15 ENCOUNTER — PRENATAL OFFICE VISIT (OUTPATIENT)
Dept: OBGYN | Facility: CLINIC | Age: 39
End: 2024-04-15
Payer: COMMERCIAL

## 2024-04-15 VITALS
DIASTOLIC BLOOD PRESSURE: 69 MMHG | HEART RATE: 60 BPM | BODY MASS INDEX: 27.15 KG/M2 | SYSTOLIC BLOOD PRESSURE: 122 MMHG | OXYGEN SATURATION: 100 % | WEIGHT: 168.2 LBS

## 2024-04-15 DIAGNOSIS — Z34.93 PRENATAL CARE IN THIRD TRIMESTER: Primary | ICD-10-CM

## 2024-04-15 PROCEDURE — 99207 PR PRENATAL VISIT: CPT

## 2024-04-15 NOTE — TELEPHONE ENCOUNTER
OB Triage Call      Is patient's OB/Midwife with the [facility] or [facility]? [facility]- Proceed with triage     Reason for call: Call from patient wondering if her contractions are just elvira-ya or real contractions; she lives 45 min away from [facility].    They started 1.5 hrs ago. They are between 2-5 min apart, during anywhere from 30 to 60 seconds; just mild-moderate contraction on lower front part - does not seem to increase in intensity; fetal movement is normal; no rupture of membrane; induction scheduled for Tuesday; has OB f/up on Monday, 4/15/24    Mucus plug came out - Wednesday, 4/10/24    Assessment: does not appear to be real contractions    Plan: home care    Patient plans to deliver at [place]    Patient's primary OB Provider is Dr. De Dios.      Per protocol recommendations Patient to follow home care recommendations.       Is patient's delivering hospital on divert? No      38w4d    Estimated Date of Delivery: 2024        OB History    Para Term  AB Living   5 2 2 0 2 2   SAB IAB Ectopic Multiple Live Births   2 0 0 0 2      # Outcome Date GA Lbr Александр/2nd Weight Sex Type Anes PTL Lv   5 Current            4 SAB 10/2022           3 Term 10/27/21 39w4d / 00:13 3.33 kg (7 lb 5.5 oz) F  EPI N ARTUR      Name: Lizzeth      Apgar1: 8  Apgar5: 8   2 SAB 2020           1 Term 19 38w4d 12:13 / 01:23 3.45 kg (7 lb 9.7 oz) M Vag-Spont EPI N ARTUR      Name: [place]      Apgar1: 8  Apgar5: 9      Obstetric Comments   FOB is Isidro   It's a GIRL!       Lab Results   Component Value Date    GBS Negative 2019          Penny Lloyd RN 24 8:59 PM  [facility] Nurse Advisor      Reason for Disposition   [1] History of prior delivery (multipara) AND [2] contractions > 10 minutes OR for < 1 hour    Additional Information   Negative: Passed out (i.e., lost consciousness, collapsed and was not responding)   Negative: Shock suspected (e.g.,  "cold/pale/clammy skin, too weak to stand, low BP, rapid pulse)   Negative: Difficult to awaken or acting confused (e.g., disoriented, slurred speech)   Negative: [1] SEVERE abdominal pain (e.g., excruciating) AND [2] constant AND [3] present > 1 hour   Negative: SEVERE bleeding (e.g., continuous red blood from vagina, or large blood clots)   Negative: Umbilical cord hanging out of the vagina (shiny, white, curled appearance, \"like telephone cord\")   Negative: Uncontrollable urge to push (i.e., feels like baby is coming out now)   Negative: Can see baby   Negative: Sounds like a life-threatening emergency to the triager   Negative: [1] First baby (primipara) AND [2] contractions < 6 minutes apart  AND [3] present 2 hours   Negative: [1] History of prior delivery (multipara) AND [2] contractions < 10 minutes apart AND [3] present 1 hour   Negative: [1] History of rapid prior delivery AND [2] contractions < 10 minutes apart   Negative: [1] Leakage of fluid from vagina AND [2] green or brown in color   Negative: Leakage of fluid from vagina   Negative: Vaginal bleeding or spotting  (Exception: Brief spotting after intercourse or pelvic exam.)   Negative: Baby moving less today (e.g., kick count < 5 in 1 hour or < 10 in 2 hours)   Negative: Severe headache or headache that won't go away   Negative: New blurred vision or vision changes   Negative: MODERATE-SEVERE abdominal pain   Negative: [1] MILD abdominal pain (e.g., doesn't interfere with normal activities) AND [2] constant AND [3] present > 2 hours   Negative: Fever 100.4 F (38.0 C) or higher   Negative: Patient sounds very sick or weak to the triager   Negative: [1] First baby (primipara) AND [2] contractions < 10 minutes apart AND [3] present 4 hours or longer   Negative: [1] First baby (primipara) AND [2] contractions > 5 minutes apart OR for < 2 hours    Protocols used: Pregnancy - Labor-A-    "

## 2024-04-16 ENCOUNTER — NURSE TRIAGE (OUTPATIENT)
Dept: NURSING | Facility: CLINIC | Age: 39
End: 2024-04-16
Payer: COMMERCIAL

## 2024-04-16 NOTE — TELEPHONE ENCOUNTER
39 weeks pregnant and is scheduling for an induction tonight.  Due date is 2024.  Third pregnancy.   MD is Aamir De Dios out of [place] and [place].  Hospital is [place].      OB Triage Call      Is patient's OB/Midwife with [facility]? [facility]- Proceed with triage     Reason for call: This morning patient noticed a brownish discharge in vaginal area and went walking felt something come out in her underwear and was blood darker.  Blood is still coming.    Assessment: Patient    Plan: Go to Labor and Delivery.    Patient plans to deliver at [place]    Patient's primary OB Provider is Aleena De Dios.      Per protocol recommendations Patient to be evaluated in L&D. Patient's primary OB is [facility].  Labor and delivery at [place] ([phone]) notified of patient's pending arrival.  Report given to Fatuma.      Is patient's delivering hospital on divert? No      38w6d    Estimated Date of Delivery: 2024        OB History    Para Term  AB Living   5 2 2 0 2 2   SAB IAB Ectopic Multiple Live Births   2 0 0 0 2      # Outcome Date GA Lbr Александр/2nd Weight Sex Type Anes PTL Lv   5 Current            4 SAB 10/2022           3 Term 10/27/21 39w4d / 00:13 3.33 kg (7 lb 5.5 oz) F  EPI N ARTUR      Name: Lizzeth      Apgar1: 8  Apgar5: 8   2 SAB 2020           1 Term 19 38w4d 12:13 / 01:23 3.45 kg (7 lb 9.7 oz) M Vag-Spont EPI N ARTUR      Name: [place]      Apgar1: 8  Apgar5: 9      Obstetric Comments   FOB is Isidro   It's a GIRL!       Lab Results   Component Value Date    GBS Negative 2019          Elizabeth Chopra RN 24 6:29 AM  [facility] Nurse Advisor  Reason for Disposition   SEVERE bleeding (e.g., continuous red blood from vagina, or large blood clots)    Additional Information   Negative: Passed out (i.e., lost consciousness, collapsed and was not responding)   Negative: Shock suspected (e.g., cold/pale/clammy skin,  too weak to stand, low BP, rapid pulse)   Negative: Difficult to awaken or acting confused (e.g., disoriented, slurred speech)   Negative: [1] SEVERE abdominal pain (e.g., excruciating) AND [2] constant AND [3] present > 1 hour    Protocols used: Pregnancy - Labor-A-AH

## 2024-04-22 ENCOUNTER — ALLIED HEALTH/NURSE VISIT (OUTPATIENT)
Dept: FAMILY MEDICINE | Facility: OTHER | Age: 39
End: 2024-04-22
Payer: COMMERCIAL

## 2024-04-22 ENCOUNTER — NURSE TRIAGE (OUTPATIENT)
Dept: FAMILY MEDICINE | Facility: OTHER | Age: 39
End: 2024-04-22

## 2024-04-22 ENCOUNTER — TELEPHONE (OUTPATIENT)
Dept: FAMILY MEDICINE | Facility: OTHER | Age: 39
End: 2024-04-22
Payer: COMMERCIAL

## 2024-04-22 VITALS
RESPIRATION RATE: 18 BRPM | TEMPERATURE: 97.9 F | OXYGEN SATURATION: 99 % | SYSTOLIC BLOOD PRESSURE: 102 MMHG | DIASTOLIC BLOOD PRESSURE: 60 MMHG | HEART RATE: 75 BPM

## 2024-04-22 DIAGNOSIS — R10.32 LLQ ABDOMINAL PAIN: Primary | ICD-10-CM

## 2024-04-22 PROCEDURE — 99207 PR NO CHARGE NURSE ONLY: CPT

## 2024-04-22 NOTE — TELEPHONE ENCOUNTER
Pt calling asking if she was supposed to get her rhogam shot after leaving the hospital this past week     Upon chart review baby blood type was negative.     RN reviewed with mom this is not typically needed with negative blood type       Please review/advise    Taryn Vann RN

## 2024-04-22 NOTE — TELEPHONE ENCOUNTER
Pt had a vaginally delivery on 4/16/24.    She is wondering if she was supposed to receive Rhogam before being discharged from the hospital.  She has A (-) blood type.  The below message states baby has Rh (-) blood type.    Routing to provider to clarify if pt was supposed to get Rhogam after delivering/before being discharged from hospital.    Lakeisha Mcneal, NIGEL

## 2024-04-22 NOTE — TELEPHONE ENCOUNTER
FYI  Nurse Triage SBAR    Is this a 2nd Level Triage? NO    Situation: mom walk in with 6 day old daughter for  appt    Background: gave birth 6 days ago. Normal delivery. Today leaned over to place blanket on baby and felt a pull in lower left side/abdomen. Pulling pain lasted a few minutes then resolved. This happened about a hour ago.     Assessment: no pain right now. VSS. Afebrile.  No other symptoms. No firmness or bulging on the lower left abdominal side. The pain resolved with rest. No pain at this time.     Protocol Recommended Disposition:   Home Care    Recommendation: reviewed home care instructions with patient. Reviewed high alert signs and symptoms and when to call back with concerns.     Reviewed with clinic DP who agreed with homecare.   Does the patient meet one of the following criteria for ADS visit consideration? No  Reason for Disposition   Sounds like afterbirth pains (e.g., lower abdominal cramps that come and go, during first week postpartum)    Additional Information   Negative: Passed out (i.e., fainted, collapsed and was not responding)   Negative: Shock suspected (e.g., cold/pale/clammy skin, too weak to stand, low BP, rapid pulse)   Negative: Sounds like a life-threatening emergency to the triager   Negative: Followed an abdomen (stomach) injury   Negative: Chest pain   Negative: Abdominal pain and pregnant < 20 weeks   Negative: Abdominal pain and pregnant 20 or more weeks   Negative: Pain is mainly in upper abdomen (if needed ask: 'is it mainly above the belly button?')   Negative: Abdomen bloating or swelling are main symptoms   Negative: SEVERE abdominal pain (e.g., excruciating)   Negative: Vomiting red blood or black (coffee ground) material   Negative: Blood in bowel movements  (Exception: Blood on surface of BM with constipation.)   Negative: Black or tarry bowel movements  (Exception: Chronic-unchanged black-grey BMs AND is taking iron pills or Pepto-Bismol.)   Negative: MILD  "TO MODERATE constant pain lasting > 2 hours   Negative: Vomiting bile (green color)   Negative: Patient sounds very sick or weak to the triager   Negative: Passed out (i.e., lost consciousness, collapsed and was not responding)   Negative: Shock suspected (e.g., cold/pale/clammy skin, too weak to stand, low BP, rapid pulse)   Negative: Difficult to awaken or acting confused (e.g., disoriented, slurred speech)   Negative: Sounds like a life-threatening emergency to the triager   Negative: Followed an abdomen (stomach) injury   Negative: Pain is mainly in upper abdomen  (if needed ask: \"is it mainly above the belly button?\")   Negative:  incision pain is main symptom   Negative: Pain or burning with passing urine (urination) is main symptom   Negative: Vaginal bleeding is main symptom   Negative: Over 6 weeks since delivery   Negative: [1] SEVERE abdominal pain AND [2] lasts > 1 hour   Negative: [1] Vomiting AND [2] contains red blood  (Exception: Few streaks and only occurred once.)   Negative: [1] Vomiting AND [2] contains black (\"coffee ground\") material   Negative: Fever 100.4 F (38.0 C) or higher   Negative: [1] Strong urge to urinate BUT [2] can't pass urine for > 4 hours (or can only pass few drops)   Negative: Blood in bowel movements  (Exception: Small streak of blood on surface of BM with constipation.)   Negative: Black or tarry bowel movements  (Exception: Chronic-unchanged black-grey BMs AND is taking iron pills or Pepto-Bismol.)   Negative: New blurred vision or vision changes   Negative: [1] Upper abdominal pain AND [2] Systolic BP >= 140 OR Diastolic BP >= 90   Negative: [1] Vomiting AND [2] contains bile (green color)   Negative: Patient sounds very sick or weak to the triager   Negative: [1] Constant abdominal pain AND [2] present > 2 hours   Negative: [1] Vomiting AND [2] abdomen looks much more swollen than usual  (Exception: Unchanged from normal postpartum appearance.)   Negative: White " of the eyes have turned yellow (i.e., jaundice)   Negative: Blood in urine (red, pink, or tea-colored)   Negative: [1] Mild pain comes and goes (e.g., crampy) AND [2] lasts > 3 days   Negative: [1] Pain or burning with passing urine (urination) AND [2] frequency AND [3] urgency   Negative: Pain or burning with passing urine (urination)   Negative: Foul smelling vaginal discharge (i.e., lochia)    Protocols used: Abdominal Pain - Female-A-OH, Postpartum - Abdominal Pain-A-AH

## 2024-05-10 ENCOUNTER — MEDICAL CORRESPONDENCE (OUTPATIENT)
Dept: HEALTH INFORMATION MANAGEMENT | Facility: CLINIC | Age: 39
End: 2024-05-10
Payer: COMMERCIAL

## 2024-05-29 DIAGNOSIS — R14.0 ABDOMINAL BLOATING: ICD-10-CM

## 2024-05-29 DIAGNOSIS — K76.9 LIVER LESION: Primary | ICD-10-CM

## 2024-05-29 DIAGNOSIS — K59.00 CONSTIPATION, UNSPECIFIED CONSTIPATION TYPE: ICD-10-CM

## 2024-06-03 ENCOUNTER — PRENATAL OFFICE VISIT (OUTPATIENT)
Dept: OBGYN | Facility: OTHER | Age: 39
End: 2024-06-03
Payer: COMMERCIAL

## 2024-06-03 VITALS — DIASTOLIC BLOOD PRESSURE: 66 MMHG | BODY MASS INDEX: 24.32 KG/M2 | WEIGHT: 150.7 LBS | SYSTOLIC BLOOD PRESSURE: 106 MMHG

## 2024-06-03 DIAGNOSIS — D18.03 LIVER HEMANGIOMA: ICD-10-CM

## 2024-06-03 PROBLEM — O41.8X90 SUBCHORIONIC HEMATOMA: Status: RESOLVED | Noted: 2018-10-25 | Resolved: 2024-06-03

## 2024-06-03 PROBLEM — Z23 NEED FOR TDAP VACCINATION: Status: RESOLVED | Noted: 2023-08-16 | Resolved: 2024-06-03

## 2024-06-03 PROBLEM — O46.8X9 SUBCHORIONIC HEMATOMA: Status: RESOLVED | Noted: 2018-10-25 | Resolved: 2024-06-03

## 2024-06-03 PROBLEM — O09.529 AMA (ADVANCED MATERNAL AGE) MULTIGRAVIDA 35+: Status: RESOLVED | Noted: 2023-08-16 | Resolved: 2024-06-03

## 2024-06-03 PROBLEM — O26.899 RH NEGATIVE STATE IN ANTEPARTUM PERIOD: Status: RESOLVED | Noted: 2018-12-31 | Resolved: 2024-06-03

## 2024-06-03 PROBLEM — Z67.91 RH NEGATIVE STATE IN ANTEPARTUM PERIOD: Status: RESOLVED | Noted: 2018-12-31 | Resolved: 2024-06-03

## 2024-06-03 PROCEDURE — 99207 PR POST PARTUM EXAM: CPT | Performed by: OBSTETRICS & GYNECOLOGY

## 2024-06-03 ASSESSMENT — ANXIETY QUESTIONNAIRES
1. FEELING NERVOUS, ANXIOUS, OR ON EDGE: NOT AT ALL
IF YOU CHECKED OFF ANY PROBLEMS ON THIS QUESTIONNAIRE, HOW DIFFICULT HAVE THESE PROBLEMS MADE IT FOR YOU TO DO YOUR WORK, TAKE CARE OF THINGS AT HOME, OR GET ALONG WITH OTHER PEOPLE: NOT DIFFICULT AT ALL
2. NOT BEING ABLE TO STOP OR CONTROL WORRYING: SEVERAL DAYS
GAD7 TOTAL SCORE: 3
6. BECOMING EASILY ANNOYED OR IRRITABLE: SEVERAL DAYS
GAD7 TOTAL SCORE: 3
7. FEELING AFRAID AS IF SOMETHING AWFUL MIGHT HAPPEN: NOT AT ALL
5. BEING SO RESTLESS THAT IT IS HARD TO SIT STILL: NOT AT ALL
3. WORRYING TOO MUCH ABOUT DIFFERENT THINGS: SEVERAL DAYS

## 2024-06-03 ASSESSMENT — PATIENT HEALTH QUESTIONNAIRE - PHQ9
5. POOR APPETITE OR OVEREATING: NOT AT ALL
SUM OF ALL RESPONSES TO PHQ QUESTIONS 1-9: 3

## 2024-06-03 NOTE — PROGRESS NOTES
Subjective  39 year old non-pregnant female presents today for a postpartum visit.  Patient had an  on 2024.  No pain.  No vaginal bleeding.  No problems urinating.  Normal bowel movements.  Patient is breast feeding.  No signs and symptoms of ppd.  Patient scored a 3 on the PHQ-9 and a 3 on the DIANE-7.  No thoughts of suicide or infanticide.  Patient is not due for a pap smear today.  Patient is wanting to do condoms for birth control.  She did have an incidental liver mass seen.  She was seen by GI for a consult and they recommended follow up after baby.  She will schedule blood work and repeat ultrasound with them.        ROS: 10 point ROS neg other than the symptoms noted above in the HPI.  History reviewed. No pertinent past medical history.  Past Surgical History:   Procedure Laterality Date    LAPAROSCOPIC CHOLECYSTECTOMY N/A 2019    Procedure: LAPAROSCOPIC CHOLECYSTECTOMY;  Surgeon: Davey Rinaldi MD;  Location: [facility]    LEEP TX, CERVICAL       Family History   Problem Relation Age of Onset    No Known Problems Mother     Cancer Maternal Grandfather     Coronary Artery Disease Maternal Grandfather     Myocardial Infarction Maternal Grandfather     Other - See Comments Maternal Grandfather         stroke    Other - See Comments Maternal Uncle         stroke     Social History     Tobacco Use    Smoking status: Former     Current packs/day: 0.00     Types: Cigarettes     Quit date: 2014     Years since quittin.4    Smokeless tobacco: Never   Substance Use Topics    Alcohol use: Not Currently         Objective  Vitals: /66 (BP Location: Right arm, Cuff Size: Adult Regular)   Wt 68.4 kg (150 lb 11.2 oz)   LMP 2023   Breastfeeding Yes   BMI 24.32 kg/m    BMI= Body mass index is 24.32 kg/m .         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

## 2024-06-03 NOTE — PATIENT INSTRUCTIONS
Please call if you any questions.    [address]   [address]  [phone]        Sherly De Dios,

## 2024-06-20 ENCOUNTER — MYC MEDICAL ADVICE (OUTPATIENT)
Dept: OBGYN | Facility: OTHER | Age: 39
End: 2024-06-20
Payer: COMMERCIAL

## 2024-06-20 DIAGNOSIS — T14.8XXA BRUISING: Primary | ICD-10-CM

## 2024-06-20 NOTE — TELEPHONE ENCOUNTER
Pt is wondering if her platelets should be checked again.    Pt was last seen by Dr. De Dios on 6/3/24 for a 6 weeks post partum visit.    Pt states she forgot to mention at that appt that she is bruising easily.    Routing to Dr. De Dios to see if she would like to order labs for pt or if pt should follow up with [facility] to further investigate her bruising.    Lakeisha Mcneal RN

## 2024-06-21 NOTE — TELEPHONE ENCOUNTER
I can order them and will let her know the results when I have them.  She may need to see her hematologist however.    Sherly De Dios, DO

## 2024-06-25 ENCOUNTER — MEDICAL CORRESPONDENCE (OUTPATIENT)
Dept: HEALTH INFORMATION MANAGEMENT | Facility: CLINIC | Age: 39
End: 2024-06-25
Payer: COMMERCIAL

## 2024-06-26 ENCOUNTER — LAB (OUTPATIENT)
Dept: LAB | Facility: OTHER | Age: 39
End: 2024-06-26
Payer: COMMERCIAL

## 2024-06-26 DIAGNOSIS — T14.8XXA BRUISING: ICD-10-CM

## 2024-06-26 LAB
ERYTHROCYTE [DISTWIDTH] IN BLOOD BY AUTOMATED COUNT: 12 % (ref 10–15)
HCT VFR BLD AUTO: 39.7 % (ref 35–47)
HGB BLD-MCNC: 13.1 G/DL (ref 11.7–15.7)
MCH RBC QN AUTO: 31.2 PG (ref 26.5–33)
MCHC RBC AUTO-ENTMCNC: 33 G/DL (ref 31.5–36.5)
MCV RBC AUTO: 95 FL (ref 78–100)
PLATELET # BLD AUTO: 172 10E3/UL (ref 150–450)
RBC # BLD AUTO: 4.2 10E6/UL (ref 3.8–5.2)
WBC # BLD AUTO: 6.9 10E3/UL (ref 4–11)

## 2024-06-26 PROCEDURE — 85027 COMPLETE CBC AUTOMATED: CPT

## 2024-06-26 PROCEDURE — 36415 COLL VENOUS BLD VENIPUNCTURE: CPT

## 2024-07-14 ENCOUNTER — HEALTH MAINTENANCE LETTER (OUTPATIENT)
Age: 39
End: 2024-07-14

## 2024-07-30 ENCOUNTER — LAB (OUTPATIENT)
Dept: LAB | Facility: CLINIC | Age: 39
End: 2024-07-30
Payer: COMMERCIAL

## 2024-07-30 DIAGNOSIS — K59.00 CONSTIPATION, UNSPECIFIED CONSTIPATION TYPE: ICD-10-CM

## 2024-07-30 DIAGNOSIS — R14.0 ABDOMINAL BLOATING: ICD-10-CM

## 2024-07-30 DIAGNOSIS — K76.9 LIVER LESION: ICD-10-CM

## 2024-07-30 LAB — AFP SERPL-MCNC: <1.8 NG/ML

## 2024-07-30 PROCEDURE — 36415 COLL VENOUS BLD VENIPUNCTURE: CPT

## 2024-07-30 PROCEDURE — 82105 ALPHA-FETOPROTEIN SERUM: CPT

## 2024-09-24 NOTE — PATIENT INSTRUCTIONS
What to watch out for are: regular contractions every 5 min, vaginal bleeding, decreased fetal movement, or leakage of fluid.  Please call the office or go to L&D if you develop any of these signs and symptoms.      I will see you for your follow up appointment.  Please feel free to call if you have any questions or concerns.      Thanks,  Sherly De Dios, DO     actual

## 2024-11-19 ENCOUNTER — MEDICAL CORRESPONDENCE (OUTPATIENT)
Dept: HEALTH INFORMATION MANAGEMENT | Facility: CLINIC | Age: 39
End: 2024-11-19
Payer: COMMERCIAL

## 2024-12-02 ENCOUNTER — HOSPITAL ENCOUNTER (OUTPATIENT)
Dept: ULTRASOUND IMAGING | Facility: CLINIC | Age: 39
Discharge: HOME | End: 2024-12-02
Attending: PHYSICIAN ASSISTANT | Admitting: PHYSICIAN ASSISTANT
Payer: COMMERCIAL

## 2024-12-02 DIAGNOSIS — K76.9 LIVER LESION: ICD-10-CM

## 2024-12-02 DIAGNOSIS — R14.0 ABDOMINAL BLOATING: ICD-10-CM

## 2024-12-02 DIAGNOSIS — K59.00 CONSTIPATION: ICD-10-CM

## 2024-12-02 PROCEDURE — 76705 ECHO EXAM OF ABDOMEN: CPT

## 2025-03-18 ENCOUNTER — APPOINTMENT (OUTPATIENT)
Dept: GENERAL RADIOLOGY | Facility: CLINIC | Age: 40
End: 2025-03-18
Attending: STUDENT IN AN ORGANIZED HEALTH CARE EDUCATION/TRAINING PROGRAM
Payer: COMMERCIAL

## 2025-03-18 ENCOUNTER — HOSPITAL ENCOUNTER (EMERGENCY)
Facility: CLINIC | Age: 40
Discharge: HOME | End: 2025-03-18
Attending: STUDENT IN AN ORGANIZED HEALTH CARE EDUCATION/TRAINING PROGRAM | Admitting: STUDENT IN AN ORGANIZED HEALTH CARE EDUCATION/TRAINING PROGRAM
Payer: COMMERCIAL

## 2025-03-18 VITALS
SYSTOLIC BLOOD PRESSURE: 119 MMHG | WEIGHT: 161 LBS | OXYGEN SATURATION: 100 % | RESPIRATION RATE: 16 BRPM | DIASTOLIC BLOOD PRESSURE: 76 MMHG | HEART RATE: 80 BPM | TEMPERATURE: 98 F | BODY MASS INDEX: 25.99 KG/M2

## 2025-03-18 DIAGNOSIS — G89.29 CHRONIC PAIN OF BOTH SHOULDERS: ICD-10-CM

## 2025-03-18 DIAGNOSIS — M25.511 CHRONIC PAIN OF BOTH SHOULDERS: ICD-10-CM

## 2025-03-18 DIAGNOSIS — M25.512 CHRONIC PAIN OF BOTH SHOULDERS: ICD-10-CM

## 2025-03-18 LAB
ANION GAP SERPL CALCULATED.3IONS-SCNC: 12 MMOL/L (ref 7–15)
BASOPHILS # BLD AUTO: 0.1 10E3/UL (ref 0–0.2)
BASOPHILS NFR BLD AUTO: 1 %
BUN SERPL-MCNC: 19.7 MG/DL (ref 6–20)
CALCIUM SERPL-MCNC: 9.4 MG/DL (ref 8.8–10.4)
CHLORIDE SERPL-SCNC: 100 MMOL/L (ref 98–107)
CREAT SERPL-MCNC: 0.72 MG/DL (ref 0.51–0.95)
CRP SERPL-MCNC: 3.21 MG/L
EGFRCR SERPLBLD CKD-EPI 2021: >90 ML/MIN/1.73M2
EOSINOPHIL # BLD AUTO: 0.2 10E3/UL (ref 0–0.7)
EOSINOPHIL NFR BLD AUTO: 4 %
ERYTHROCYTE [DISTWIDTH] IN BLOOD BY AUTOMATED COUNT: 13.1 % (ref 10–15)
ERYTHROCYTE [SEDIMENTATION RATE] IN BLOOD BY WESTERGREN METHOD: 18 MM/HR (ref 0–20)
GLUCOSE SERPL-MCNC: 110 MG/DL (ref 70–99)
HCO3 SERPL-SCNC: 25 MMOL/L (ref 22–29)
HCT VFR BLD AUTO: 35.3 % (ref 35–47)
HGB BLD-MCNC: 12 G/DL (ref 11.7–15.7)
IMM GRANULOCYTES # BLD: 0 10E3/UL
IMM GRANULOCYTES NFR BLD: 0 %
LYMPHOCYTES # BLD AUTO: 1.8 10E3/UL (ref 0.8–5.3)
LYMPHOCYTES NFR BLD AUTO: 33 %
MCH RBC QN AUTO: 31.1 PG (ref 26.5–33)
MCHC RBC AUTO-ENTMCNC: 34 G/DL (ref 31.5–36.5)
MCV RBC AUTO: 92 FL (ref 78–100)
MONOCYTES # BLD AUTO: 0.5 10E3/UL (ref 0–1.3)
MONOCYTES NFR BLD AUTO: 10 %
NEUTROPHILS # BLD AUTO: 2.8 10E3/UL (ref 1.6–8.3)
NEUTROPHILS NFR BLD AUTO: 52 %
NRBC # BLD AUTO: 0 10E3/UL
NRBC BLD AUTO-RTO: 0 /100
PLATELET # BLD AUTO: 161 10E3/UL (ref 150–450)
POTASSIUM SERPL-SCNC: 3.9 MMOL/L (ref 3.4–5.3)
RBC # BLD AUTO: 3.86 10E6/UL (ref 3.8–5.2)
SODIUM SERPL-SCNC: 137 MMOL/L (ref 135–145)
TSH SERPL DL<=0.005 MIU/L-ACNC: 1.35 UIU/ML (ref 0.3–4.2)
WBC # BLD AUTO: 5.3 10E3/UL (ref 4–11)

## 2025-03-18 PROCEDURE — 84443 ASSAY THYROID STIM HORMONE: CPT | Performed by: STUDENT IN AN ORGANIZED HEALTH CARE EDUCATION/TRAINING PROGRAM

## 2025-03-18 PROCEDURE — 80048 BASIC METABOLIC PNL TOTAL CA: CPT | Performed by: STUDENT IN AN ORGANIZED HEALTH CARE EDUCATION/TRAINING PROGRAM

## 2025-03-18 PROCEDURE — 85652 RBC SED RATE AUTOMATED: CPT | Performed by: STUDENT IN AN ORGANIZED HEALTH CARE EDUCATION/TRAINING PROGRAM

## 2025-03-18 PROCEDURE — 85041 AUTOMATED RBC COUNT: CPT | Performed by: STUDENT IN AN ORGANIZED HEALTH CARE EDUCATION/TRAINING PROGRAM

## 2025-03-18 PROCEDURE — 73030 X-RAY EXAM OF SHOULDER: CPT | Mod: RT

## 2025-03-18 PROCEDURE — 82565 ASSAY OF CREATININE: CPT | Performed by: STUDENT IN AN ORGANIZED HEALTH CARE EDUCATION/TRAINING PROGRAM

## 2025-03-18 PROCEDURE — 86140 C-REACTIVE PROTEIN: CPT | Performed by: STUDENT IN AN ORGANIZED HEALTH CARE EDUCATION/TRAINING PROGRAM

## 2025-03-18 PROCEDURE — 36415 COLL VENOUS BLD VENIPUNCTURE: CPT | Performed by: STUDENT IN AN ORGANIZED HEALTH CARE EDUCATION/TRAINING PROGRAM

## 2025-03-18 PROCEDURE — 99284 EMERGENCY DEPT VISIT MOD MDM: CPT | Performed by: STUDENT IN AN ORGANIZED HEALTH CARE EDUCATION/TRAINING PROGRAM

## 2025-03-18 PROCEDURE — 82435 ASSAY OF BLOOD CHLORIDE: CPT | Performed by: STUDENT IN AN ORGANIZED HEALTH CARE EDUCATION/TRAINING PROGRAM

## 2025-03-18 PROCEDURE — 85004 AUTOMATED DIFF WBC COUNT: CPT | Performed by: STUDENT IN AN ORGANIZED HEALTH CARE EDUCATION/TRAINING PROGRAM

## 2025-03-18 RX ORDER — METHYLPREDNISOLONE 4 MG/1
TABLET ORAL
Qty: 21 TABLET | Refills: 0 | Status: SHIPPED | OUTPATIENT
Start: 2025-03-18

## 2025-03-18 RX ORDER — IBUPROFEN 200 MG
200-800 TABLET ORAL EVERY 4 HOURS PRN
COMMUNITY

## 2025-03-18 ASSESSMENT — ACTIVITIES OF DAILY LIVING (ADL)
ADLS_ACUITY_SCORE: 41

## 2025-03-18 ASSESSMENT — COLUMBIA-SUICIDE SEVERITY RATING SCALE - C-SSRS
6. HAVE YOU EVER DONE ANYTHING, STARTED TO DO ANYTHING, OR PREPARED TO DO ANYTHING TO END YOUR LIFE?: NO
2. HAVE YOU ACTUALLY HAD ANY THOUGHTS OF KILLING YOURSELF IN THE PAST MONTH?: NO
1. IN THE PAST MONTH, HAVE YOU WISHED YOU WERE DEAD OR WISHED YOU COULD GO TO SLEEP AND NOT WAKE UP?: NO

## 2025-03-18 NOTE — ED TRIAGE NOTES
Pt presents with bilateral shoulder pain for one month. Pt feels pain into right shoulder blade. Pt states at times does go down both arms. Pt feels spasms to upper back.      Triage Assessment (Adult)       Row Name 03/18/25 1409          Triage Assessment    Airway WDL WDL        Respiratory WDL    Respiratory WDL WDL        Skin Circulation/Temperature WDL    Skin Circulation/Temperature WDL WDL        Cardiac WDL    Cardiac WDL WDL        Peripheral/Neurovascular WDL    Peripheral Neurovascular WDL WDL        Cognitive/Neuro/Behavioral WDL    Cognitive/Neuro/Behavioral WDL WDL

## 2025-03-18 NOTE — DISCHARGE INSTRUCTIONS
Seen today for shoulder pain.  Your lab work was reassuring.  Believe that a close outpatient follow-up with sports medicine may be of benefit as well as a Medrol Dosepak to help with the inflammation with recommendation to continue ibuprofen Tylenol for pain control.  Please follow-up with her primary care doctor in 1 to 2 weeks as well to see if the Medrol dose pack has provided improvement.

## 2025-03-18 NOTE — ED PROVIDER NOTES
History     Chief Complaint   Patient presents with    Shoulder Pain     HPI  Ethan Watt is a 40 year old female who with a month-long history of bilateral shoulder pain.  This comes and goes right and then left.  She has no numbness ting of her extremities but she has intermittent numbness of her forearms.  These do not last for very long period time.  The major complaint at this point is that she continues to have reduced range of motion as well due to discomfort.  She has no noted recent cough cold congestion fevers chest pain chills breathing shortness of breath cough abdominal pains urinary changes stooling changes or any pelvic abnormalities consistent with girdle weakness or lower leg weakness.  She has not had any medication changes addition of medications or trauma or injury.  She denies any neck pathology injuries in the past or surgeries.    Allergies:  No Known Allergies    Problem List:    Patient Active Problem List    Diagnosis Date Noted    Liver hemangioma 03/23/2024     Priority: Medium    Iron deficiency anemia 01/08/2024     Priority: Medium    Thrombocytopenia 03/04/2019     Priority: Medium     Repeat Platelets weekly.  Clinically impression still is gestational thrombocytopenia. No further testing/special management as long as platelets remain above 70k. Per Hematology      H/O LEEP 12/31/2018     Priority: Medium     LEEP- unknown date or results.   10/22/18 NIL pap, Neg HPV (Found in abstracted records)  3/23/21 NIL pap, Neg HPV. Plan cotest in 1 year due bef 3/23/22.  5/6/22 Lost to follow up  4/13/23 NIL pap, Neg HPV. Plan: cotest in 3 years      Needle stick, hypodermic, accidental 09/19/2018     Priority: Medium     10- visit 9/19/2018 Patient with needlestick in June, patient declined testing, she has been taking Truvada for ppx since then and receiving regular bloodwork - will plan for 3rd trimester STD labs. 10/10/2018: pt has HIV, RPR,Hep B & C done 2wks ago, will get  results and bring them to her next appt. On chart, no RPR, ordered 11/6 10- visit 9/19/2018 Patient with needlestick in June, patient declined testing, she has been taking Truvada for ppx since then and receiving regular bloodwork - will plan for 3rd trimester STD labs. 10/10/2018: pt has HIV, RPR,Hep B & C done 2wks ago, will get results and bring them to her next appt. On chart, no RPR, ordered 11/6      History of cleft palate 09/19/2018     Priority: Medium     9/19/2018 Patient born with cleft palate, repaired as infant. Will refer to [facility] 9/19/2018 Patient born with cleft palate, repaired as infant. Will refer to [facility]      Abnormal cervical Papanicolaou smear 09/19/2018     Priority: Medium     9/19/2018 Patient with h/o colpo, unsure if she ever had a LEEP, has records at home, will bring them to next visit. If she had a LEEP, will need serial CLs q 2 weeks from 16-24 weeks. 10/2/2018 Still no records available, but what patient describes sounds like a LEEP, ordered serial CLs. 9/19/2018 Patient with h/o colpo, unsure if she ever had a LEEP, has records at home, will bring them to next visit. If she had a LEEP, will need serial CLs q 2 weeks from 16-24 weeks. 10/2/2018 Still no records available, but what patient describes sounds like a LEEP, ordered serial CLs.          Past Medical History:    No past medical history on file.    Past Surgical History:    Past Surgical History:   Procedure Laterality Date    LAPAROSCOPIC CHOLECYSTECTOMY N/A 01/16/2019    Procedure: LAPAROSCOPIC CHOLECYSTECTOMY;  Surgeon: Davey Rinaldi MD;  Location: [facility]    LEEP TX, CERVICAL         Family History:    Family History   Problem Relation Age of Onset    No Known Problems Mother     Cancer Maternal Grandfather     Coronary Artery Disease Maternal Grandfather     Myocardial Infarction Maternal Grandfather     Other - See Comments Maternal Grandfather         stroke    Other - See Comments Maternal Uncle         stroke        Social History:  Marital Status:   [2]  Social History     Tobacco Use    Smoking status: Former     Current packs/day: 0.00     Types: Cigarettes     Quit date: 12/17/2014     Years since quitting: 10.2    Smokeless tobacco: Never   Vaping Use    Vaping status: Never Used   Substance Use Topics    Alcohol use: Not Currently    Drug use: No        Medications:    ibuprofen (ADVIL/MOTRIN) 200 MG tablet  methylPREDNISolone (MEDROL DOSEPAK) 4 MG tablet therapy pack          Review of Systems   Musculoskeletal:         Shoulder elbow and hand joint pains   All other systems reviewed and are negative.      Physical Exam   BP: 129/65  Pulse: 80  Temp: 98  F (36.7  C)  Resp: 16  Weight: 73 kg (161 lb)  SpO2: 99 %      Physical Exam  Vitals and nursing note reviewed.   Constitutional:       General: She is not in acute distress.     Appearance: Normal appearance. She is not diaphoretic.   HENT:      Head: Normocephalic and atraumatic.      Mouth/Throat:      Mouth: Mucous membranes are moist.   Eyes:      General: No scleral icterus.     Conjunctiva/sclera: Conjunctivae normal.   Cardiovascular:      Rate and Rhythm: Normal rate.      Pulses: Normal pulses.      Heart sounds: Normal heart sounds. No murmur heard.  Pulmonary:      Effort: Pulmonary effort is normal. No respiratory distress.      Breath sounds: Normal breath sounds. No wheezing or rales.   Abdominal:      General: Abdomen is flat.   Musculoskeletal:      Cervical back: Neck supple.      Comments: She has some mild active range of motion reduction however able to fully extend flex abduct and abduct the shoulder.  Positive impingement bilaterally for some discomfort.  Negative scarf sign.  Radial radial pulses equal bilaterally with no significant neurostatus changes.   Skin:     General: Skin is warm.      Findings: No rash.   Neurological:      General: No focal deficit present.      Mental Status: She is alert and oriented to person, place, and  time. Mental status is at baseline.      Cranial Nerves: No cranial nerve deficit.      Sensory: No sensory deficit.      Motor: No weakness.      Coordination: Coordination normal.      Gait: Gait normal.   Psychiatric:         Mood and Affect: Mood normal.         ED Course        Procedures                Results for orders placed or performed during the hospital encounter of 03/18/25 (from the past 24 hours)   CBC with platelets differential    Narrative    The following orders were created for panel order CBC with platelets differential.  Procedure                               Abnormality         Status                     ---------                               -----------         ------                     CBC with platelets and ...[[id number]]                      Final result                 Please view results for these tests on the individual orders.   Basic metabolic panel   Result Value Ref Range    Sodium 137 135 - 145 mmol/L    Potassium 3.9 3.4 - 5.3 mmol/L    Chloride 100 98 - 107 mmol/L    Carbon Dioxide (CO2) 25 22 - 29 mmol/L    Anion Gap 12 7 - 15 mmol/L    Urea Nitrogen 19.7 6.0 - 20.0 mg/dL    Creatinine 0.72 0.51 - 0.95 mg/dL    GFR Estimate >90 >60 mL/min/1.73m2    Calcium 9.4 8.8 - 10.4 mg/dL    Glucose 110 (H) 70 - 99 mg/dL   TSH with free T4 reflex   Result Value Ref Range    TSH 1.35 0.30 - 4.20 uIU/mL   CRP inflammation   Result Value Ref Range    CRP Inflammation 3.21 <5.00 mg/L   Erythrocyte sedimentation rate auto   Result Value Ref Range    Erythrocyte Sedimentation Rate 18 0 - 20 mm/hr   CBC with platelets and differential   Result Value Ref Range    WBC Count 5.3 4.0 - 11.0 10e3/uL    RBC Count 3.86 3.80 - 5.20 10e6/uL    Hemoglobin 12.0 11.7 - 15.7 g/dL    Hematocrit 35.3 35.0 - 47.0 %    MCV 92 78 - 100 fL    MCH 31.1 26.5 - 33.0 pg    MCHC 34.0 31.5 - 36.5 g/dL    RDW 13.1 10.0 - 15.0 %    Platelet Count 161 150 - 450 10e3/uL    % Neutrophils 52 %    % Lymphocytes 33 %    %  Monocytes 10 %    % Eosinophils 4 %    % Basophils 1 %    % Immature Granulocytes 0 %    NRBCs per 100 WBC 0 <1 /100    Absolute Neutrophils 2.8 1.6 - 8.3 10e3/uL    Absolute Lymphocytes 1.8 0.8 - 5.3 10e3/uL    Absolute Monocytes 0.5 0.0 - 1.3 10e3/uL    Absolute Eosinophils 0.2 0.0 - 0.7 10e3/uL    Absolute Basophils 0.1 0.0 - 0.2 10e3/uL    Absolute Immature Granulocytes 0.0 <=0.4 10e3/uL    Absolute NRBCs 0.0 10e3/uL       Medications - No data to display    Assessments & Plan (with Medical Decision Making)     I have reviewed the nursing notes.    I have reviewed the findings, diagnosis, plan and need for follow up with the patient.      Medical Decision Making  Ethan Watt is a 40 year old female who with a month-long history of bilateral shoulder pain.  This comes and goes right and then left.  She has no numbness ting of her extremities but she has intermittent numbness of her forearms.  These do not last for very long period time.  The major complaint at this point is that she continues to have reduced range of motion as well due to discomfort.  She has no noted recent cough cold congestion fevers chest pain chills breathing shortness of breath cough abdominal pains urinary changes stooling changes or any pelvic abnormalities consistent with girdle weakness or lower leg weakness.  She has not had any medication changes addition of medications or trauma or injury.  She denies any neck pathology injuries in the past or surgeries.    Patient's vitals are reassuring with a blood pressure 120/65 temperature 98 pulse of 80 and oxygen saturation of 99% room air.  She has a mild impingement syndrome on the examination at bedside of the shoulders bilaterally.  She is got no warmth tenderness swelling over the shoulder joint.  She has got no clavicular injuries or palpable tenderness around the clavicle or trapezius.  She is got no sensation changes to her distal tremors.  Pulses are equal bilaterally.  She has  got no swelling of her lower extremities.  She has a history of clotting disorders in the past.  Theoretically there could be some thoracic outlet pathology however with the sudden onset now and only been bothersome for the past month less likely but possible and patient can have this further addressed by primary team if necessary.  She does have improvement with ibuprofen neck further makes this unlikely.  She has not had any recent injuries falls or signs of clotting disorders or trauma or injury.  She is not a diabetic which makes frozen shoulder less likely especially with this being bilaterally intermittent in nature.  She is could have some radiculopathy from the C-spine but has no C-spine tenderness or pain with movement.  She is got a history of autoimmune diseases including thyroid problems or family history of rheumatoid arthritis or ulcer colitis Crohn's disease or any malignancies.  She also explained that there is some mild intermittent elbow discomfort and joint pains of the hand which makes this more autoimmune possible with rheumatoid arthritis and other pathologies of life such as lupus etc.  However no other lupus-like pathologies noted.      ESR CRP CBC BMP all within normal limits-imaging self interpreted did not send she was signs of bony abnormalities.  Discussed fines with patient.  Close outpatient follow-up with a Medrol Dosepak and sports medicine may be of benefit to further evaluate patient's complaints.  However denies any medical surgical emergencies currently present for continued evaluation at this time patient discharged home    New Prescriptions    METHYLPREDNISOLONE (MEDROL DOSEPAK) 4 MG TABLET THERAPY PACK    Follow Package Directions       Final diagnoses:   Chronic pain of both shoulders       3/18/2025   [facility] EMERGENCY DEPT       Polly Mahmood MD  03/18/25 [id number]

## 2025-03-18 NOTE — MEDICATION SCRIBE - ADMISSION MEDICATION HISTORY
Medication Scribe Admission Medication History    Admission medication history is complete. The information provided in this note is only as accurate as the sources available at the time of the update.    Information Source(s): Patient and CareEverywhere/SureScripts via in-person    Pertinent Information: Verified no use of pain pump, inhalers or prescription eye drops currently.      Changes made to PTA medication list:  Added: ibuprofen  Deleted: PNV  Changed: None    Allergies reviewed with patient and updates made in EHR: yes    Medication History Completed By: AILEEN BLOCK 3/18/2025 4:04 PM    PTA Med List   Medication Sig Last Dose/Taking    ibuprofen (ADVIL/MOTRIN) 200 MG tablet Take 200-800 mg by mouth every 4 hours as needed for pain or fever (max 800mg per day). 3/18/2025 at 10:00 AM

## 2025-03-19 ENCOUNTER — PATIENT OUTREACH (OUTPATIENT)
Dept: CARE COORDINATION | Facility: CLINIC | Age: 40
End: 2025-03-19
Payer: COMMERCIAL

## 2025-03-31 ASSESSMENT — PAIN SCALES - PAIN ENJOYMENT GENERAL ACTIVITY SCALE (PEG)
PEG_TOTALSCORE: 6.33
INTERFERED_GENERAL_ACTIVITY: 7
AVG_PAIN_PASTWEEK: 7
INTERFERED_ENJOYMENT_LIFE: 5

## 2025-04-01 ENCOUNTER — OFFICE VISIT (OUTPATIENT)
Dept: FAMILY MEDICINE | Facility: OTHER | Age: 40
End: 2025-04-01
Payer: COMMERCIAL

## 2025-04-01 VITALS
TEMPERATURE: 98.3 F | BODY MASS INDEX: 25.11 KG/M2 | HEART RATE: 60 BPM | RESPIRATION RATE: 16 BRPM | WEIGHT: 160 LBS | OXYGEN SATURATION: 99 % | HEIGHT: 67 IN | SYSTOLIC BLOOD PRESSURE: 116 MMHG | DIASTOLIC BLOOD PRESSURE: 78 MMHG

## 2025-04-01 DIAGNOSIS — M25.511 CHRONIC PAIN OF BOTH SHOULDERS: Primary | ICD-10-CM

## 2025-04-01 DIAGNOSIS — R14.0 ABDOMINAL BLOATING: ICD-10-CM

## 2025-04-01 DIAGNOSIS — K76.9 LIVER LESION: ICD-10-CM

## 2025-04-01 DIAGNOSIS — M25.531 RIGHT WRIST PAIN: ICD-10-CM

## 2025-04-01 DIAGNOSIS — M25.512 CHRONIC PAIN OF BOTH SHOULDERS: Primary | ICD-10-CM

## 2025-04-01 DIAGNOSIS — K59.00 CONSTIPATION, UNSPECIFIED CONSTIPATION TYPE: ICD-10-CM

## 2025-04-01 DIAGNOSIS — G89.29 CHRONIC PAIN OF BOTH SHOULDERS: Primary | ICD-10-CM

## 2025-04-01 LAB
RHEUMATOID FACT SERPL-ACNC: <10 IU/ML
T3FREE SERPL-MCNC: 3 PG/ML (ref 2–4.4)
TSH SERPL DL<=0.005 MIU/L-ACNC: 1.45 UIU/ML (ref 0.3–4.2)

## 2025-04-01 PROCEDURE — 36415 COLL VENOUS BLD VENIPUNCTURE: CPT | Performed by: PHYSICIAN ASSISTANT

## 2025-04-01 PROCEDURE — 84481 FREE ASSAY (FT-3): CPT | Performed by: PHYSICIAN ASSISTANT

## 2025-04-01 PROCEDURE — 3078F DIAST BP <80 MM HG: CPT | Performed by: PHYSICIAN ASSISTANT

## 2025-04-01 PROCEDURE — 86431 RHEUMATOID FACTOR QUANT: CPT | Performed by: PHYSICIAN ASSISTANT

## 2025-04-01 PROCEDURE — 3074F SYST BP LT 130 MM HG: CPT | Performed by: PHYSICIAN ASSISTANT

## 2025-04-01 PROCEDURE — 99204 OFFICE O/P NEW MOD 45 MIN: CPT | Performed by: PHYSICIAN ASSISTANT

## 2025-04-01 PROCEDURE — 86038 ANTINUCLEAR ANTIBODIES: CPT | Performed by: PHYSICIAN ASSISTANT

## 2025-04-01 PROCEDURE — 1125F AMNT PAIN NOTED PAIN PRSNT: CPT | Performed by: PHYSICIAN ASSISTANT

## 2025-04-01 PROCEDURE — 86200 CCP ANTIBODY: CPT | Performed by: PHYSICIAN ASSISTANT

## 2025-04-01 PROCEDURE — 84443 ASSAY THYROID STIM HORMONE: CPT | Performed by: PHYSICIAN ASSISTANT

## 2025-04-01 ASSESSMENT — PAIN SCALES - GENERAL: PAINLEVEL_OUTOF10: SEVERE PAIN (7)

## 2025-04-01 NOTE — PROGRESS NOTES
Assessment & Plan     Chronic pain of both shoulders  Right wrist pain  Patient is a 40 year old female who presents with her spouse for follow up on recent ED visit. She was seen at the [facility] ED for chronic shoulder pains x2-3 months. She had a viral URI prior to onset of symptoms, but denies injury, change in activity, diet or medication. Review of this note shows that all testing and imaging was negative. Patient was treated with medrol dosepak and given referral to sports med ortho.     Today she informs me that the symptoms did improve with medrol dosepak and also with ibuprofen. The symptoms seem to wax/wane in the intensity as well as which side is more affected. Today the left is worse than the right, but at the ED visit the right side was causing her more trouble. She also reports some right lateral wrist pain, but denies injury or other trigger for this. Patient was able to demonstrate full AROM with pain noted along the anterior shoulders during crossover. 5/5 strength with resisted int/ext and abduction. Pain also in anterior shoulder with resisted internal rotation. Otherwise unremarkable. Unable to recreate pains with palpation. Dicussed with patient that the location of her pain is most consistent with biceps tendonitis. Differential also includes bursitis, impingement, cervical radicular pains. As she is in process of being seen by ortho, I will start her with our PT team. I did offer naproxen or mobic, she would prefer to stick with ibuprofen. She will call if she changes her mind.     Patient requests autoimmune workup. I reviewed previous labs which included esr/crp, normal. I will run basic autoimmune labs and notify her of the results.   - Anti Nuclear Mylene IgG by IFA with Reflex; Future  - Rheumatoid factor; Future  - Cyclic Citrullinated Peptide Antibody IgG; Future  - Physical Therapy  Referral; Future  - Anti Nuclear Mylene IgG by IFA with Reflex  - Rheumatoid factor  - Cyclic  "Citrullinated Peptide Antibody IgG    Liver lesion  Abdominal bloating  Constipation, unspecified constipation type  These were orders placed by another provider and released by the . I will defer to the other provider to manage the results of their orders.  - TSH with free T4 reflex  - T3 Free    MED REC REQUIRED  Post Medication Reconciliation Status:  Patient was not discharged from an inpatient facility or TCU    Subjective   Ethan is a 40 year old, presenting for the following health issues:  Hospital F/U (ER follow-up)      4/1/2025     8:08 AM   Additional Questions   Roomed by Adalgisa CHARLES   Accompanied by -Isidro     HPI  She is new to us it says but she reports she has been to  at [place] but goes to OB here in [place]. Shouldn't need to request records from anywhere else.     ED/UC Followup:  Facility:  [facility]  Date of visit: 03/18/2025  Reason for visit: Shoulder pain; bilateral  Current Status: Medrol dose pack did help her pain, just came right back. She would report 1-2/10 pain level when she finished the Medrol Dose Pack, when she started that medication on day 2 she would have a dry cough that sounded rattle, 2 days ago she sort of still had it. She would report a 7/10 pain level right now.      Review of Systems  Constitutional, HEENT, cardiovascular, pulmonary, gi and gu systems are negative, except as otherwise noted.      Objective    /78   Pulse 60   Temp 98.3  F (36.8  C) (Temporal)   Resp 16   Ht 1.702 m (5' 7\")   Wt 72.6 kg (160 lb)   SpO2 99%   BMI 25.06 kg/m    Body mass index is 25.06 kg/m .  Physical Exam   GENERAL: alert and no distress  RESP: lungs clear to auscultation - no rales, rhonchi or wheezes  CV: regular rate and rhythm, normal S1 S2, no S3 or S4, no murmur, click or rub, no peripheral edema  MS: Shoulders and neck with full AROM, Pain along the anterior shoulders bilaterally during crossover. 5/5 strength with resisted int/ext and " abduction bilaterally. Pain in anterior shoulder, L>R, with resisted internal rotation. No tenderness to palpation over shoulders or musculature.   NEURO: Normal strength and tone, mentation intact and speech normal  BACK: no CVA tenderness, no paralumbar tenderness  PSYCH: mentation appears normal, affect normal/bright    Admission on 03/18/2025, Discharged on 03/18/2025   Component Date Value Ref Range Status    Sodium 03/18/2025 137  135 - 145 mmol/L Final    Potassium 03/18/2025 3.9  3.4 - 5.3 mmol/L Final    Chloride 03/18/2025 100  98 - 107 mmol/L Final    Carbon Dioxide (CO2) 03/18/2025 25  22 - 29 mmol/L Final    Anion Gap 03/18/2025 12  7 - 15 mmol/L Final    Urea Nitrogen 03/18/2025 19.7  6.0 - 20.0 mg/dL Final    Creatinine 03/18/2025 0.72  0.51 - 0.95 mg/dL Final    GFR Estimate 03/18/2025 >90  >60 mL/min/1.73m2 Final    eGFR calculated using 2021 CKD-EPI equation.    Calcium 03/18/2025 9.4  8.8 - 10.4 mg/dL Final    Glucose 03/18/2025 110 (H)  70 - 99 mg/dL Final    TSH 03/18/2025 1.35  0.30 - 4.20 uIU/mL Final    CRP Inflammation 03/18/2025 3.21  <5.00 mg/L Final    Erythrocyte Sedimentation Rate 03/18/2025 18  0 - 20 mm/hr Final    WBC Count 03/18/2025 5.3  4.0 - 11.0 10e3/uL Final    RBC Count 03/18/2025 3.86  3.80 - 5.20 10e6/uL Final    Hemoglobin 03/18/2025 12.0  11.7 - 15.7 g/dL Final    Hematocrit 03/18/2025 35.3  35.0 - 47.0 % Final    MCV 03/18/2025 92  78 - 100 fL Final    MCH 03/18/2025 31.1  26.5 - 33.0 pg Final    MCHC 03/18/2025 34.0  31.5 - 36.5 g/dL Final    RDW 03/18/2025 13.1  10.0 - 15.0 % Final    Platelet Count 03/18/2025 161  150 - 450 10e3/uL Final    % Neutrophils 03/18/2025 52  % Final    % Lymphocytes 03/18/2025 33  % Final    % Monocytes 03/18/2025 10  % Final    % Eosinophils 03/18/2025 4  % Final    % Basophils 03/18/2025 1  % Final    % Immature Granulocytes 03/18/2025 0  % Final    NRBCs per 100 WBC 03/18/2025 0  <1 /100 Final    Absolute Neutrophils 03/18/2025 2.8  1.6 -  8.3 10e3/uL Final    Absolute Lymphocytes 03/18/2025 1.8  0.8 - 5.3 10e3/uL Final    Absolute Monocytes 03/18/2025 0.5  0.0 - 1.3 10e3/uL Final    Absolute Eosinophils 03/18/2025 0.2  0.0 - 0.7 10e3/uL Final    Absolute Basophils 03/18/2025 0.1  0.0 - 0.2 10e3/uL Final    Absolute Immature Granulocytes 03/18/2025 0.0  <=0.4 10e3/uL Final    Absolute NRBCs 03/18/2025 0.0  10e3/uL Final        EXAM: XR SHOULDER RIGHT G/E 3 VIEWS  LOCATION: [facility]  DATE: 3/18/2025     INDICATION: shoulder pain  COMPARISON: None.                                                                      IMPRESSION: Normal joint spaces and alignment. No fracture.    Signed Electronically by: Armando Martin PA-C

## 2025-04-02 LAB — ANA SER QL IF: NEGATIVE

## 2025-04-03 LAB — CCP AB SER IA-ACNC: 1.9 U/ML

## 2025-07-19 ENCOUNTER — HEALTH MAINTENANCE LETTER (OUTPATIENT)
Age: 40
End: 2025-07-19

## (undated) DEVICE — GLOVE PROTEXIS W/NEU-THERA 7.5  2D73TE75

## (undated) DEVICE — SU MONOCRYL 4-0 PS-2 18" UND Y496G

## (undated) DEVICE — ESU SUCTION/IRRIGATION SYSTEM PISTOL GRIP

## (undated) DEVICE — ENDO TROCAR FIRST ENTRY KII FIOS ADV FIX 11X100MM CFF33

## (undated) DEVICE — DRSG STERI STRIP 1/2X4" R1547

## (undated) DEVICE — SOL NACL 0.9% INJ 1000ML BAG 07983-09

## (undated) DEVICE — ESU GROUND PAD UNIVERSAL W/O CORD

## (undated) DEVICE — PACK GENERAL LAPAOSCOPY

## (undated) DEVICE — ENDO CLIP CARTIRDGE K2 MED/LG 10 1112

## (undated) DEVICE — STOCKING SLEEVE COMPRESSION CALF MED

## (undated) DEVICE — SU PDS II 1 CT-1 MONOFIL Z347H

## (undated) DEVICE — ENDO TROCAR SHIELDED BLADED KII Z-THRD 05X100MM CTB03

## (undated) DEVICE — SYR 10ML PREFILLED 0.9% NACL INJ NOT STERILE 306500

## (undated) DEVICE — SU VICRYL 3-0 SH 27" UND J416H

## (undated) DEVICE — ENDO POUCH UNIV RETRIEVAL SYSTEM INZII 10MM CD001

## (undated) DEVICE — SOL ADH LIQUID BENZOIN SWAB 0.6ML C1544

## (undated) DEVICE — STOCKING SLEEVE COMPRESSION CALF LG

## (undated) DEVICE — ENDO TROCAR BLUNT TIP KII BALLOON 12X100MM C0R47

## (undated) DEVICE — NDL INSUFFLATION 13GA 120MM C2201

## (undated) DEVICE — ESU HOOK TIP 5MM CONMED

## (undated) DEVICE — DEVICE SUTURE GRASPER TROCAR CLOSURE 14GA PMITCSG

## (undated) RX ORDER — NEOSTIGMINE METHYLSULFATE 1 MG/ML
VIAL (ML) INJECTION
Status: DISPENSED
Start: 2019-01-16

## (undated) RX ORDER — GLYCOPYRROLATE 0.2 MG/ML
INJECTION INTRAMUSCULAR; INTRAVENOUS
Status: DISPENSED
Start: 2019-01-16

## (undated) RX ORDER — PHENYLEPHRINE HCL IN 0.9% NACL 1 MG/10 ML
SYRINGE (ML) INTRAVENOUS
Status: DISPENSED
Start: 2019-01-16

## (undated) RX ORDER — BUPIVACAINE HYDROCHLORIDE AND EPINEPHRINE 5; 5 MG/ML; UG/ML
INJECTION, SOLUTION EPIDURAL; INTRACAUDAL; PERINEURAL
Status: DISPENSED
Start: 2019-01-16

## (undated) RX ORDER — ONDANSETRON 2 MG/ML
INJECTION INTRAMUSCULAR; INTRAVENOUS
Status: DISPENSED
Start: 2019-01-16

## (undated) RX ORDER — DEXAMETHASONE SODIUM PHOSPHATE 10 MG/ML
INJECTION INTRAMUSCULAR; INTRAVENOUS
Status: DISPENSED
Start: 2019-01-16

## (undated) RX ORDER — PROPOFOL 10 MG/ML
INJECTION, EMULSION INTRAVENOUS
Status: DISPENSED
Start: 2019-01-16

## (undated) RX ORDER — FENTANYL CITRATE 50 UG/ML
INJECTION, SOLUTION INTRAMUSCULAR; INTRAVENOUS
Status: DISPENSED
Start: 2019-01-16